# Patient Record
Sex: MALE | Race: OTHER | HISPANIC OR LATINO
[De-identification: names, ages, dates, MRNs, and addresses within clinical notes are randomized per-mention and may not be internally consistent; named-entity substitution may affect disease eponyms.]

---

## 2017-11-27 ENCOUNTER — APPOINTMENT (OUTPATIENT)
Dept: FAMILY MEDICINE | Facility: CLINIC | Age: 32
End: 2017-11-27
Payer: SELF-PAY

## 2017-11-27 ENCOUNTER — EMERGENCY (EMERGENCY)
Facility: HOSPITAL | Age: 32
LOS: 1 days | Discharge: HOME | End: 2017-11-27
Attending: EMERGENCY MEDICINE | Admitting: EMERGENCY MEDICINE
Payer: SELF-PAY

## 2017-11-27 VITALS
DIASTOLIC BLOOD PRESSURE: 99 MMHG | WEIGHT: 227 LBS | SYSTOLIC BLOOD PRESSURE: 152 MMHG | HEIGHT: 73 IN | BODY MASS INDEX: 30.09 KG/M2 | OXYGEN SATURATION: 96 % | HEART RATE: 75 BPM | TEMPERATURE: 97.7 F

## 2017-11-27 VITALS
TEMPERATURE: 98 F | RESPIRATION RATE: 18 BRPM | DIASTOLIC BLOOD PRESSURE: 89 MMHG | SYSTOLIC BLOOD PRESSURE: 141 MMHG | OXYGEN SATURATION: 99 % | HEART RATE: 81 BPM

## 2017-11-27 VITALS
WEIGHT: 220.02 LBS | DIASTOLIC BLOOD PRESSURE: 94 MMHG | HEART RATE: 88 BPM | TEMPERATURE: 98 F | OXYGEN SATURATION: 98 % | SYSTOLIC BLOOD PRESSURE: 162 MMHG | RESPIRATION RATE: 18 BRPM | HEIGHT: 73 IN

## 2017-11-27 DIAGNOSIS — Z87.891 PERSONAL HISTORY OF NICOTINE DEPENDENCE: ICD-10-CM

## 2017-11-27 DIAGNOSIS — M51.26 OTHER INTERVERTEBRAL DISC DISPLACEMENT, LUMBAR REGION: ICD-10-CM

## 2017-11-27 DIAGNOSIS — M48.061 SPINAL STENOSIS, LUMBAR REGION WITHOUT NEUROGENIC CLAUDICATION: ICD-10-CM

## 2017-11-27 DIAGNOSIS — M54.5 LOW BACK PAIN: ICD-10-CM

## 2017-11-27 DIAGNOSIS — Z83.3 FAMILY HISTORY OF DIABETES MELLITUS: ICD-10-CM

## 2017-11-27 DIAGNOSIS — N52.9 MALE ERECTILE DYSFUNCTION, UNSPECIFIED: ICD-10-CM

## 2017-11-27 LAB
ALBUMIN SERPL ELPH-MCNC: 3.9 G/DL — SIGNIFICANT CHANGE UP (ref 3.3–5.2)
ALP SERPL-CCNC: 85 U/L — SIGNIFICANT CHANGE UP (ref 40–120)
ALT FLD-CCNC: 57 U/L — HIGH
ANION GAP SERPL CALC-SCNC: 13 MMOL/L — SIGNIFICANT CHANGE UP (ref 5–17)
APPEARANCE UR: CLEAR — SIGNIFICANT CHANGE UP
AST SERPL-CCNC: 33 U/L — SIGNIFICANT CHANGE UP
BACTERIA # UR AUTO: ABNORMAL
BASOPHILS # BLD AUTO: 0 K/UL — SIGNIFICANT CHANGE UP (ref 0–0.2)
BASOPHILS NFR BLD AUTO: 0.3 % — SIGNIFICANT CHANGE UP (ref 0–2)
BILIRUB SERPL-MCNC: 0.3 MG/DL — LOW (ref 0.4–2)
BILIRUB UR-MCNC: NEGATIVE — SIGNIFICANT CHANGE UP
BUN SERPL-MCNC: 25 MG/DL — HIGH (ref 8–20)
CALCIUM SERPL-MCNC: 9.3 MG/DL — SIGNIFICANT CHANGE UP (ref 8.6–10.2)
CHLORIDE SERPL-SCNC: 100 MMOL/L — SIGNIFICANT CHANGE UP (ref 98–107)
CO2 SERPL-SCNC: 23 MMOL/L — SIGNIFICANT CHANGE UP (ref 22–29)
COLOR SPEC: YELLOW — SIGNIFICANT CHANGE UP
COMMENT - URINE: SIGNIFICANT CHANGE UP
CREAT SERPL-MCNC: 1.07 MG/DL — SIGNIFICANT CHANGE UP (ref 0.5–1.3)
DIFF PNL FLD: ABNORMAL
EOSINOPHIL # BLD AUTO: 0.3 K/UL — SIGNIFICANT CHANGE UP (ref 0–0.5)
EOSINOPHIL NFR BLD AUTO: 2.6 % — SIGNIFICANT CHANGE UP (ref 0–5)
GLUCOSE SERPL-MCNC: 90 MG/DL — SIGNIFICANT CHANGE UP (ref 70–115)
GLUCOSE UR QL: NEGATIVE MG/DL — SIGNIFICANT CHANGE UP
HCT VFR BLD CALC: 45.8 % — SIGNIFICANT CHANGE UP (ref 42–52)
HGB BLD-MCNC: 15.9 G/DL — SIGNIFICANT CHANGE UP (ref 14–18)
KETONES UR-MCNC: NEGATIVE — SIGNIFICANT CHANGE UP
LEUKOCYTE ESTERASE UR-ACNC: NEGATIVE — SIGNIFICANT CHANGE UP
LIDOCAIN IGE QN: 31 U/L — SIGNIFICANT CHANGE UP (ref 22–51)
LYMPHOCYTES # BLD AUTO: 2.5 K/UL — SIGNIFICANT CHANGE UP (ref 1–4.8)
LYMPHOCYTES # BLD AUTO: 23 % — SIGNIFICANT CHANGE UP (ref 20–55)
MCHC RBC-ENTMCNC: 30.3 PG — SIGNIFICANT CHANGE UP (ref 27–31)
MCHC RBC-ENTMCNC: 34.7 G/DL — SIGNIFICANT CHANGE UP (ref 32–36)
MCV RBC AUTO: 87.4 FL — SIGNIFICANT CHANGE UP (ref 80–94)
MONOCYTES # BLD AUTO: 1.2 K/UL — HIGH (ref 0–0.8)
MONOCYTES NFR BLD AUTO: 11.2 % — HIGH (ref 3–10)
NEUTROPHILS # BLD AUTO: 6.7 K/UL — SIGNIFICANT CHANGE UP (ref 1.8–8)
NEUTROPHILS NFR BLD AUTO: 62.4 % — SIGNIFICANT CHANGE UP (ref 37–73)
NITRITE UR-MCNC: NEGATIVE — SIGNIFICANT CHANGE UP
PH UR: 6 — SIGNIFICANT CHANGE UP (ref 5–8)
PLATELET # BLD AUTO: 213 K/UL — SIGNIFICANT CHANGE UP (ref 150–400)
POTASSIUM SERPL-MCNC: 3.8 MMOL/L — SIGNIFICANT CHANGE UP (ref 3.5–5.3)
POTASSIUM SERPL-SCNC: 3.8 MMOL/L — SIGNIFICANT CHANGE UP (ref 3.5–5.3)
PROT SERPL-MCNC: 7.2 G/DL — SIGNIFICANT CHANGE UP (ref 6.6–8.7)
PROT UR-MCNC: 15 MG/DL
RBC # BLD: 5.24 M/UL — SIGNIFICANT CHANGE UP (ref 4.6–6.2)
RBC # FLD: 12.9 % — SIGNIFICANT CHANGE UP (ref 11–15.6)
RBC CASTS # UR COMP ASSIST: SIGNIFICANT CHANGE UP /HPF (ref 0–4)
SODIUM SERPL-SCNC: 136 MMOL/L — SIGNIFICANT CHANGE UP (ref 135–145)
SP GR SPEC: 1.02 — SIGNIFICANT CHANGE UP (ref 1.01–1.02)
UROBILINOGEN FLD QL: NEGATIVE MG/DL — SIGNIFICANT CHANGE UP
WBC # BLD: 10.8 K/UL — SIGNIFICANT CHANGE UP (ref 4.8–10.8)
WBC # FLD AUTO: 10.8 K/UL — SIGNIFICANT CHANGE UP (ref 4.8–10.8)
WBC UR QL: SIGNIFICANT CHANGE UP

## 2017-11-27 PROCEDURE — 93000 ELECTROCARDIOGRAM COMPLETE: CPT

## 2017-11-27 PROCEDURE — 36415 COLL VENOUS BLD VENIPUNCTURE: CPT

## 2017-11-27 PROCEDURE — 81001 URINALYSIS AUTO W/SCOPE: CPT

## 2017-11-27 PROCEDURE — 80053 COMPREHEN METABOLIC PANEL: CPT

## 2017-11-27 PROCEDURE — 85027 COMPLETE CBC AUTOMATED: CPT

## 2017-11-27 PROCEDURE — 99053 MED SERV 10PM-8AM 24 HR FAC: CPT

## 2017-11-27 PROCEDURE — 96374 THER/PROPH/DIAG INJ IV PUSH: CPT | Mod: XU

## 2017-11-27 PROCEDURE — 74177 CT ABD & PELVIS W/CONTRAST: CPT | Mod: 26

## 2017-11-27 PROCEDURE — 99284 EMERGENCY DEPT VISIT MOD MDM: CPT | Mod: 25

## 2017-11-27 PROCEDURE — 74177 CT ABD & PELVIS W/CONTRAST: CPT

## 2017-11-27 PROCEDURE — 96375 TX/PRO/DX INJ NEW DRUG ADDON: CPT

## 2017-11-27 PROCEDURE — 87086 URINE CULTURE/COLONY COUNT: CPT

## 2017-11-27 PROCEDURE — 99395 PREV VISIT EST AGE 18-39: CPT | Mod: 25

## 2017-11-27 PROCEDURE — 96376 TX/PRO/DX INJ SAME DRUG ADON: CPT

## 2017-11-27 PROCEDURE — 99285 EMERGENCY DEPT VISIT HI MDM: CPT | Mod: 25

## 2017-11-27 PROCEDURE — 83690 ASSAY OF LIPASE: CPT

## 2017-11-27 PROCEDURE — 99214 OFFICE O/P EST MOD 30 MIN: CPT

## 2017-11-27 RX ORDER — SODIUM CHLORIDE 9 MG/ML
1000 INJECTION INTRAMUSCULAR; INTRAVENOUS; SUBCUTANEOUS ONCE
Qty: 0 | Refills: 0 | Status: COMPLETED | OUTPATIENT
Start: 2017-11-27 | End: 2017-11-27

## 2017-11-27 RX ORDER — METRONIDAZOLE 500 MG
1 TABLET ORAL
Qty: 42 | Refills: 0
Start: 2017-11-27 | End: 2017-12-11

## 2017-11-27 RX ORDER — KETOROLAC TROMETHAMINE 30 MG/ML
30 SYRINGE (ML) INJECTION ONCE
Qty: 0 | Refills: 0 | Status: DISCONTINUED | OUTPATIENT
Start: 2017-11-27 | End: 2017-11-27

## 2017-11-27 RX ORDER — MORPHINE SULFATE 50 MG/1
4 CAPSULE, EXTENDED RELEASE ORAL ONCE
Qty: 0 | Refills: 0 | Status: DISCONTINUED | OUTPATIENT
Start: 2017-11-27 | End: 2017-11-27

## 2017-11-27 RX ORDER — SODIUM CHLORIDE 9 MG/ML
3 INJECTION INTRAMUSCULAR; INTRAVENOUS; SUBCUTANEOUS ONCE
Qty: 0 | Refills: 0 | Status: COMPLETED | OUTPATIENT
Start: 2017-11-27 | End: 2017-11-27

## 2017-11-27 RX ORDER — CIPROFLOXACIN LACTATE 400MG/40ML
1 VIAL (ML) INTRAVENOUS
Qty: 28 | Refills: 0
Start: 2017-11-27 | End: 2017-12-11

## 2017-11-27 RX ADMIN — SODIUM CHLORIDE 3 MILLILITER(S): 9 INJECTION INTRAMUSCULAR; INTRAVENOUS; SUBCUTANEOUS at 02:05

## 2017-11-27 RX ADMIN — MORPHINE SULFATE 4 MILLIGRAM(S): 50 CAPSULE, EXTENDED RELEASE ORAL at 04:02

## 2017-11-27 RX ADMIN — MORPHINE SULFATE 4 MILLIGRAM(S): 50 CAPSULE, EXTENDED RELEASE ORAL at 02:05

## 2017-11-27 RX ADMIN — Medication 30 MILLIGRAM(S): at 02:05

## 2017-11-27 RX ADMIN — SODIUM CHLORIDE 1000 MILLILITER(S): 9 INJECTION INTRAMUSCULAR; INTRAVENOUS; SUBCUTANEOUS at 02:05

## 2017-11-27 NOTE — ED PROVIDER NOTE - OBJECTIVE STATEMENT
This is a 31 y/o M presenting to the ED complaining of abdominal pain since yesterday at 0200. Patient states that he was sleeping when he started to feel a sharp lower abdominal pain. Patient states that the abdominal pain continued all day yesterday. Endorses nausea, vomiting, and diarrhea x multiple episodes. He reports the diarrhea is yellowish-brownish in color with trace amounts of blood. Deneis recent changes in diet, abx use, sick contacts, and recent travel. Denies prior abdominal surgeries. He also adds that he had a fever yesterday that was improved with tylenol.

## 2017-11-27 NOTE — ED ADULT TRIAGE NOTE - CHIEF COMPLAINT QUOTE
Pt c/o suprapubic pain and fevers since yesterday, denies pain/burning upon urination, denies N/V, c/o "some diarrhea"

## 2017-11-27 NOTE — ED ADULT NURSE NOTE - OBJECTIVE STATEMENT
Pt A&Ox4 c/o abd pain with diarrhea and blood when wiping at this time, started yesterday. Pt resting comfortably, VSS, no signs of distress at this time, pt went to CT, safety maintained, call bell in reach.

## 2017-11-27 NOTE — ED PROVIDER NOTE - PROGRESS NOTE DETAILS
pt reported bright red blood per rectum in ER.  rectal reveals brown watery stool with possible internal hemorrhoids nontoxic.  labs and imaging reviewed.   no diverticulitis on CT, but given LLQ pain/tenderness, change in bowel habits, and blood will d/c with abx and outpt gi follow up

## 2017-11-28 LAB
CULTURE RESULTS: NO GROWTH — SIGNIFICANT CHANGE UP
SPECIMEN SOURCE: SIGNIFICANT CHANGE UP

## 2018-01-03 ENCOUNTER — APPOINTMENT (OUTPATIENT)
Dept: FAMILY MEDICINE | Facility: CLINIC | Age: 33
End: 2018-01-03

## 2018-12-28 ENCOUNTER — APPOINTMENT (OUTPATIENT)
Dept: INTERNAL MEDICINE | Facility: CLINIC | Age: 33
End: 2018-12-28

## 2019-04-10 ENCOUNTER — APPOINTMENT (OUTPATIENT)
Dept: INTERNAL MEDICINE | Facility: CLINIC | Age: 34
End: 2019-04-10

## 2019-04-12 ENCOUNTER — EMERGENCY (EMERGENCY)
Facility: HOSPITAL | Age: 34
LOS: 1 days | Discharge: HOME | End: 2019-04-12
Attending: EMERGENCY MEDICINE
Payer: COMMERCIAL

## 2019-04-12 VITALS — HEART RATE: 81 BPM | RESPIRATION RATE: 18 BRPM | SYSTOLIC BLOOD PRESSURE: 146 MMHG | DIASTOLIC BLOOD PRESSURE: 103 MMHG

## 2019-04-12 VITALS
WEIGHT: 212.08 LBS | HEIGHT: 73 IN | HEART RATE: 94 BPM | RESPIRATION RATE: 20 BRPM | SYSTOLIC BLOOD PRESSURE: 166 MMHG | TEMPERATURE: 98 F | DIASTOLIC BLOOD PRESSURE: 107 MMHG | OXYGEN SATURATION: 98 %

## 2019-04-12 LAB
ALBUMIN SERPL ELPH-MCNC: 4.4 G/DL — SIGNIFICANT CHANGE UP (ref 3.3–5.2)
ALP SERPL-CCNC: 73 U/L — SIGNIFICANT CHANGE UP (ref 40–120)
ALT FLD-CCNC: 90 U/L — HIGH
ANION GAP SERPL CALC-SCNC: 13 MMOL/L — SIGNIFICANT CHANGE UP (ref 5–17)
APPEARANCE UR: CLEAR — SIGNIFICANT CHANGE UP
AST SERPL-CCNC: 64 U/L — HIGH
BASOPHILS # BLD AUTO: 0 K/UL — SIGNIFICANT CHANGE UP (ref 0–0.2)
BASOPHILS NFR BLD AUTO: 0.2 % — SIGNIFICANT CHANGE UP (ref 0–2)
BILIRUB SERPL-MCNC: 0.7 MG/DL — SIGNIFICANT CHANGE UP (ref 0.4–2)
BILIRUB UR-MCNC: NEGATIVE — SIGNIFICANT CHANGE UP
BUN SERPL-MCNC: 27 MG/DL — HIGH (ref 8–20)
CALCIUM SERPL-MCNC: 9.4 MG/DL — SIGNIFICANT CHANGE UP (ref 8.6–10.2)
CHLORIDE SERPL-SCNC: 103 MMOL/L — SIGNIFICANT CHANGE UP (ref 98–107)
CO2 SERPL-SCNC: 23 MMOL/L — SIGNIFICANT CHANGE UP (ref 22–29)
COLOR SPEC: YELLOW — SIGNIFICANT CHANGE UP
CREAT SERPL-MCNC: 0.8 MG/DL — SIGNIFICANT CHANGE UP (ref 0.5–1.3)
DIFF PNL FLD: NEGATIVE — SIGNIFICANT CHANGE UP
EOSINOPHIL # BLD AUTO: 0.2 K/UL — SIGNIFICANT CHANGE UP (ref 0–0.5)
EOSINOPHIL NFR BLD AUTO: 2.7 % — SIGNIFICANT CHANGE UP (ref 0–5)
GLUCOSE SERPL-MCNC: 88 MG/DL — SIGNIFICANT CHANGE UP (ref 70–115)
GLUCOSE UR QL: NEGATIVE MG/DL — SIGNIFICANT CHANGE UP
HCT VFR BLD CALC: 46.2 % — SIGNIFICANT CHANGE UP (ref 42–52)
HGB BLD-MCNC: 16.1 G/DL — SIGNIFICANT CHANGE UP (ref 14–18)
KETONES UR-MCNC: NEGATIVE — SIGNIFICANT CHANGE UP
LEUKOCYTE ESTERASE UR-ACNC: NEGATIVE — SIGNIFICANT CHANGE UP
LIDOCAIN IGE QN: 27 U/L — SIGNIFICANT CHANGE UP (ref 22–51)
LYMPHOCYTES # BLD AUTO: 2.1 K/UL — SIGNIFICANT CHANGE UP (ref 1–4.8)
LYMPHOCYTES # BLD AUTO: 24.9 % — SIGNIFICANT CHANGE UP (ref 20–55)
MCHC RBC-ENTMCNC: 32.2 PG — HIGH (ref 27–31)
MCHC RBC-ENTMCNC: 34.8 G/DL — SIGNIFICANT CHANGE UP (ref 32–36)
MCV RBC AUTO: 92.4 FL — SIGNIFICANT CHANGE UP (ref 80–94)
MONOCYTES # BLD AUTO: 0.6 K/UL — SIGNIFICANT CHANGE UP (ref 0–0.8)
MONOCYTES NFR BLD AUTO: 7.3 % — SIGNIFICANT CHANGE UP (ref 3–10)
NEUTROPHILS # BLD AUTO: 5.4 K/UL — SIGNIFICANT CHANGE UP (ref 1.8–8)
NEUTROPHILS NFR BLD AUTO: 64.7 % — SIGNIFICANT CHANGE UP (ref 37–73)
NITRITE UR-MCNC: NEGATIVE — SIGNIFICANT CHANGE UP
OB PNL STL: NEGATIVE — SIGNIFICANT CHANGE UP
PH UR: 5 — SIGNIFICANT CHANGE UP (ref 5–8)
PLATELET # BLD AUTO: 230 K/UL — SIGNIFICANT CHANGE UP (ref 150–400)
POTASSIUM SERPL-MCNC: 4.1 MMOL/L — SIGNIFICANT CHANGE UP (ref 3.5–5.3)
POTASSIUM SERPL-SCNC: 4.1 MMOL/L — SIGNIFICANT CHANGE UP (ref 3.5–5.3)
PROT SERPL-MCNC: 7.5 G/DL — SIGNIFICANT CHANGE UP (ref 6.6–8.7)
PROT UR-MCNC: NEGATIVE MG/DL — SIGNIFICANT CHANGE UP
RBC # BLD: 5 M/UL — SIGNIFICANT CHANGE UP (ref 4.6–6.2)
RBC # FLD: 13.1 % — SIGNIFICANT CHANGE UP (ref 11–15.6)
SODIUM SERPL-SCNC: 139 MMOL/L — SIGNIFICANT CHANGE UP (ref 135–145)
SP GR SPEC: 1.02 — SIGNIFICANT CHANGE UP (ref 1.01–1.02)
UROBILINOGEN FLD QL: NEGATIVE MG/DL — SIGNIFICANT CHANGE UP
WBC # BLD: 8.3 K/UL — SIGNIFICANT CHANGE UP (ref 4.8–10.8)
WBC # FLD AUTO: 8.3 K/UL — SIGNIFICANT CHANGE UP (ref 4.8–10.8)

## 2019-04-12 PROCEDURE — 87591 N.GONORRHOEAE DNA AMP PROB: CPT

## 2019-04-12 PROCEDURE — 80053 COMPREHEN METABOLIC PANEL: CPT

## 2019-04-12 PROCEDURE — 99284 EMERGENCY DEPT VISIT MOD MDM: CPT

## 2019-04-12 PROCEDURE — 76870 US EXAM SCROTUM: CPT | Mod: 26

## 2019-04-12 PROCEDURE — 87491 CHLMYD TRACH DNA AMP PROBE: CPT

## 2019-04-12 PROCEDURE — 83690 ASSAY OF LIPASE: CPT

## 2019-04-12 PROCEDURE — 81003 URINALYSIS AUTO W/O SCOPE: CPT

## 2019-04-12 PROCEDURE — 85027 COMPLETE CBC AUTOMATED: CPT

## 2019-04-12 PROCEDURE — 76870 US EXAM SCROTUM: CPT

## 2019-04-12 PROCEDURE — 36415 COLL VENOUS BLD VENIPUNCTURE: CPT

## 2019-04-12 PROCEDURE — 82272 OCCULT BLD FECES 1-3 TESTS: CPT

## 2019-04-12 NOTE — ED STATDOCS - GENITOURINARY, MLM
normal... no bilateral CVA tenderness. no inguinal adenopathy, no rash, penis is uncircumcised, no urethral discharge, no scrotal swelling, right testicle tenderness

## 2019-04-12 NOTE — ED ADULT NURSE NOTE - CHPI ED NUR SYMPTOMS NEG
no dizziness/no fever/no tingling/no vomiting/no weakness/no chills/no decreased eating/drinking/no nausea

## 2019-04-12 NOTE — ED STATDOCS - SECONDARY DIAGNOSIS.
Inguinal pain, unspecified laterality Hypertension, unspecified type Noncompliance with medication regimen

## 2019-04-12 NOTE — ED STATDOCS - NS_ ATTENDINGSCRIBEDETAILS _ED_A_ED_FT
I, Alec Snyder, performed the initial face to face bedside interview with this patient regarding history of present illness, review of symptoms and relevant past medical, social and family history.  I completed an independent physical examination.  I was the provider who initially evaluated this patient.  The history, relevant review of systems, past medical and surgical history, medical decision making, and physical examination was documented by the scribe in my presence and I attest to the accuracy of the documentation. Follow-up on ordered tests (ie labs, radiologic studies) and re-evaluation of the patient's status has been communicated to the ACP.  Disposition of the patient will be based on test outcome and response to ED interventions.

## 2019-04-12 NOTE — ED STATDOCS - CARE PLAN
Principal Discharge DX:	Testicular pain, unspecified  Secondary Diagnosis:	Inguinal pain, unspecified laterality Principal Discharge DX:	Testicular pain, unspecified  Secondary Diagnosis:	Inguinal pain, unspecified laterality  Secondary Diagnosis:	Hypertension, unspecified type  Secondary Diagnosis:	Noncompliance with medication regimen

## 2019-04-12 NOTE — ED STATDOCS - OBJECTIVE STATEMENT
34 y/o M pt with hx of colitis and HTN presents to ED c/o intermittent rectal bleeding and intermittent groin pain x 3 weeks. He believes the groin pain may be infection from a jacuzzi; because his wife was in same jacuzzi, was diagnosed with a yeast infection which resolved with abx; tried his wifes abx (ampicllin) with no relief. Describes the pain as a throbbing sensation in his right testicle. Also reports moderate RUQ ABD pain and dysuria. No vomiting or fever. Recently travelled from a foreign country. Is a smoker. NKDA. No further complaints at this time. 32 y/o M pt with hx of colitis and HTN presents to ED c/o intermittent rectal bleeding and intermittent groin pain x 3 weeks. He believes the groin pain may be infection from a jacuzzi; because his partner was in same jacuzzi, was diagnosed with a yeast infection which resolved with abx. Describes the pain as a burning sensation in the penis and soreness in his right testicle. Denies urethral discharge.  denies scrotal swelling.  Denies rash of groin or scrotum.  Also reports intermittent RUQ ABD pain with prior elevated LFTs.  Denies vomiting or fever. Patient reports taking various doses of metronidazole, amoxicillin and penicillin (all non-prescribed) over the course of the past 3 weeks with no relief.  Recently travelled from a foreign country. Is a smoker. NKDA. H/O HTN: reports that he is not taking any meds.  No further complaints at this time.

## 2019-04-12 NOTE — ED STATDOCS - PROGRESS NOTE DETAILS
pt is seen By Dr hernandez initially agreed with hx / pe and plan   pt has PCP Dr russell that did not f/u past 5 yrs . + hx htn was on medication dose not want to take med - states h want to try by eating healthy and count the carbs . pending Us . pt states he will f/u With his pcp labs and US d/w dr hernandez w.o any acute finding - RUQ not TTP - pt is asymptomatic   a copy of b/W and US given to the pt to follow up with pcp for possible us of the abdomen and BP control

## 2019-04-12 NOTE — ED STATDOCS - CLINICAL SUMMARY MEDICAL DECISION MAKING FREE TEXT BOX
Intermittent rectal bleeding, testicular pain, groin pain; etiology unclear, will check labs, US of testicle and UA. Intermittent rectal bleeding, testicular pain, groin pain with normal examination; etiology unclear. will check labs, US of testicle and UA.

## 2019-04-12 NOTE — ED ADULT NURSE NOTE - OBJECTIVE STATEMENT
Patient c/o intermittent rectal bleeding with clots and right sided throbbing groin pain x 3 weeks. Patient denies fever. Patient states he has hx of colitis but doesn't see a GI doctor for it. Last bout was 5 years ago. Patient denies CP, SOB, dizziness, N/V, weakness or any other acute sx at this time.

## 2019-04-13 LAB
C TRACH RRNA SPEC QL NAA+PROBE: SIGNIFICANT CHANGE UP
N GONORRHOEA RRNA SPEC QL NAA+PROBE: SIGNIFICANT CHANGE UP
SPECIMEN SOURCE: SIGNIFICANT CHANGE UP

## 2019-04-17 ENCOUNTER — APPOINTMENT (OUTPATIENT)
Dept: INTERNAL MEDICINE | Facility: CLINIC | Age: 34
End: 2019-04-17
Payer: COMMERCIAL

## 2019-04-17 ENCOUNTER — NON-APPOINTMENT (OUTPATIENT)
Age: 34
End: 2019-04-17

## 2019-04-17 VITALS
WEIGHT: 213 LBS | BODY MASS INDEX: 28.23 KG/M2 | RESPIRATION RATE: 16 BRPM | HEART RATE: 97 BPM | OXYGEN SATURATION: 97 % | TEMPERATURE: 98.3 F | SYSTOLIC BLOOD PRESSURE: 154 MMHG | DIASTOLIC BLOOD PRESSURE: 112 MMHG | HEIGHT: 73 IN

## 2019-04-17 DIAGNOSIS — Z87.19 PERSONAL HISTORY OF OTHER DISEASES OF THE DIGESTIVE SYSTEM: ICD-10-CM

## 2019-04-17 DIAGNOSIS — N50.82 SCROTAL PAIN: ICD-10-CM

## 2019-04-17 DIAGNOSIS — N64.4 MASTODYNIA: ICD-10-CM

## 2019-04-17 DIAGNOSIS — F17.200 NICOTINE DEPENDENCE, UNSPECIFIED, UNCOMPLICATED: ICD-10-CM

## 2019-04-17 DIAGNOSIS — Z86.39 PERSONAL HISTORY OF OTHER ENDOCRINE, NUTRITIONAL AND METABOLIC DISEASE: ICD-10-CM

## 2019-04-17 DIAGNOSIS — N48.9 DISORDER OF PENIS, UNSPECIFIED: ICD-10-CM

## 2019-04-17 DIAGNOSIS — Z87.898 PERSONAL HISTORY OF OTHER SPECIFIED CONDITIONS: ICD-10-CM

## 2019-04-17 DIAGNOSIS — R74.8 ABNORMAL LEVELS OF OTHER SERUM ENZYMES: ICD-10-CM

## 2019-04-17 DIAGNOSIS — Z86.79 PERSONAL HISTORY OF OTHER DISEASES OF THE CIRCULATORY SYSTEM: ICD-10-CM

## 2019-04-17 DIAGNOSIS — E66.3 OVERWEIGHT: ICD-10-CM

## 2019-04-17 DIAGNOSIS — Z01.818 ENCOUNTER FOR OTHER PREPROCEDURAL EXAMINATION: ICD-10-CM

## 2019-04-17 PROCEDURE — 93000 ELECTROCARDIOGRAM COMPLETE: CPT

## 2019-04-17 PROCEDURE — 36415 COLL VENOUS BLD VENIPUNCTURE: CPT

## 2019-04-17 PROCEDURE — 99215 OFFICE O/P EST HI 40 MIN: CPT | Mod: 25

## 2019-04-17 PROCEDURE — 99406 BEHAV CHNG SMOKING 3-10 MIN: CPT

## 2019-04-17 RX ORDER — ACETAMINOPHEN 325 MG/1
325 TABLET, FILM COATED ORAL EVERY 6 HOURS
Qty: 90 | Refills: 0 | Status: DISCONTINUED | COMMUNITY
Start: 2017-11-27 | End: 2019-04-17

## 2019-04-17 RX ORDER — OMEPRAZOLE 40 MG/1
40 CAPSULE, DELAYED RELEASE ORAL
Qty: 30 | Refills: 2 | Status: DISCONTINUED | COMMUNITY
Start: 2017-11-27 | End: 2019-04-17

## 2019-04-17 RX ORDER — AMLODIPINE BESYLATE 10 MG/1
10 TABLET ORAL DAILY
Qty: 90 | Refills: 1 | Status: DISCONTINUED | COMMUNITY
Start: 2017-11-27 | End: 2019-04-17

## 2019-04-17 RX ORDER — METRONIDAZOLE 500 MG/1
500 TABLET, FILM COATED ORAL EVERY 8 HOURS
Qty: 21 | Refills: 0 | Status: DISCONTINUED | COMMUNITY
Start: 2017-11-27 | End: 2019-04-17

## 2019-04-17 RX ORDER — CIPROFLOXACIN HYDROCHLORIDE 500 MG/1
500 TABLET, FILM COATED ORAL
Qty: 20 | Refills: 0 | Status: DISCONTINUED | COMMUNITY
Start: 2017-11-27 | End: 2019-04-17

## 2019-04-18 LAB
APPEARANCE: CLEAR
BACTERIA: NEGATIVE
BILIRUBIN URINE: NEGATIVE
BLOOD URINE: NEGATIVE
COLOR: YELLOW
GLUCOSE QUALITATIVE U: NEGATIVE
HYALINE CASTS: 1 /LPF
KETONES URINE: NEGATIVE
LEUKOCYTE ESTERASE URINE: NEGATIVE
MICROSCOPIC-UA: NORMAL
NITRITE URINE: NEGATIVE
PH URINE: 6.5
PROTEIN URINE: ABNORMAL
RED BLOOD CELLS URINE: 1 /HPF
SPECIFIC GRAVITY URINE: 1.03
SQUAMOUS EPITHELIAL CELLS: 1 /HPF
UROBILINOGEN URINE: NORMAL
WHITE BLOOD CELLS URINE: 1 /HPF

## 2019-04-21 PROBLEM — N64.4 NIPPLE PAIN: Status: RESOLVED | Noted: 2017-11-27 | Resolved: 2019-04-21

## 2019-04-21 PROBLEM — N48.9 PENILE LESION: Status: ACTIVE | Noted: 2019-04-21

## 2019-04-21 PROBLEM — Z87.898 HISTORY OF PALPITATIONS: Status: RESOLVED | Noted: 2017-11-27 | Resolved: 2019-04-21

## 2019-04-21 PROBLEM — Z87.19 HISTORY OF GASTRITIS: Status: RESOLVED | Noted: 2017-11-27 | Resolved: 2019-04-21

## 2019-04-21 PROBLEM — Z87.19 HISTORY OF RECTAL BLEEDING: Status: RESOLVED | Noted: 2017-11-27 | Resolved: 2019-04-21

## 2019-04-21 PROBLEM — E66.3 OVERWEIGHT (BMI 25.0-29.9): Status: ACTIVE | Noted: 2019-04-21

## 2019-04-21 PROBLEM — F17.200 CURRENT EVERY DAY SMOKER: Status: ACTIVE | Noted: 2017-11-27

## 2019-04-21 PROBLEM — Z87.19 HISTORY OF COLITIS: Status: RESOLVED | Noted: 2017-11-27 | Resolved: 2019-04-21

## 2019-04-21 NOTE — HISTORY OF PRESENT ILLNESS
[FreeTextEntry1] : Here for follow up after ER visit\par  [de-identified] : Here for follow up after going to the ER\par \par He states that him and his fiancee went out of the country to [facility]. He says that they spent a lot of time in [facility] and notes that he and his fiancee started to feel discomfort in their genitourinary area. He believed that he and his fiancee caught a yeast infection while in [facility]. His wife went to her gynecologist and was dx with bacterial vaginosis and was prescribed Metronidazole. \par \par He describes his genital discomfort as pressure around his testicles, abd, and thighs for about a month now. He went to the hospital on Friday and they did a scrotal US that was negative and labs were normal.  He denies having a rash or dysuria.  he thought a yeast infection could be the cause.  He denies fever/chills, abd pain, nausea, vomiting, diarrhea, burning on urination, urinary frequency, or penile discharge\par \par he does states he has some skin growths on the shaft of his penis but states he has had them since he was young\par \par He has not been taking his BP medication in over a year\par \par He states he does not drink caffeine but he does drink a lot of  ETOH. He states the he smokes about a pack of cigarettes every two days. \par \par He notes that he is no longer experiencing nipple pain and believed it was due to using steroids at the time. He states he is no longer using steroids. \par \par

## 2019-04-21 NOTE — ASSESSMENT
[FreeTextEntry1] : \par Scrotal discomfort:\par -unclear etiology\par -will check labs again\par -he had scotral US in ER which was unrevealing\par -I have advised he see a Urologist-referred today\par \par Penile skin lesion: possible genital warts\par -I discussed with him that this may be STD\par -will refer to Urologist\par \par Uncontrolled HTN:\par -risks of uncontrolled HTN discussed with him at length\par -He has been noncompliant with follow up and has not take medication in over a year\par -I advised adv he restart amlodipine 10mg PO daily  (R/B/A/side effects discussed)\par -I advised low fat/low cholesterol diet, low salt diet, and weight loss\par -EKG done today NSR at 84, LVH, non specific T wave abnormalities\par -f/u 4-6 weeks for BP check\par \par Abnormal EKG:\par -he is asymptomatic\par -will refer him to cardiology\par \par Overweight:\par -BMI 28\par -I advised low fat/low cholesterol diet, low salt diet, and weight loss\par \par Colitis: in 2017\par -he was previously referred to GI but he never went\par -he states sx resolved\par \par Left nipple pain:\par -resolved\par -he never went for imaginf that was ordered in 2017\par -he states he was doing steroids at the time and thinks that was cause\par -he is no longer doing steroids or any other drug\par \par Mod alcohol use:\par -he states he does not drink every day but does drink more heavily on weekends and when he goes out\par -I advised him to limit alcoholic beverages to no more that 2 per day\par -risks of alcohol abuse discussed\par \par Elevated LFTs:\par -may be due to alcohol use\par -I adv he cut down on alcohol use\par -will check labs again\par -will order abd US\par -will check hepatitis B and C serologies\par \par Smoker:\par -smoking cessation advised\par -risks of smoking discussed\par -counseling time 3 minutes\par \par HCM:\par \par CPE: 2017\par \par EKG 2019\par \par Tdap:  10/14/2014\par \par Hep B series: advised previously\par \par HIV testing  10/14/2014-negative.  Offered again today and he consented to testing 2019\par \par Hepatitis C: negative 10/2014\par \par Lipids near goal 10/2014-will check fasting lipids-ordered today\par \par Depression screenin2019 PHQ 2 score 0-negative\par \par F/U 4-6 weeks for BP check and lab review\par

## 2019-04-21 NOTE — COUNSELING
[Weight management counseling provided] : Weight management [Healthy eating counseling provided] : healthy eating [Patient Non-adherent to care plan] : Patient non-adherent to care plan [Activity counseling provided] : activity [Good understanding] : Patient has a good understanding of lifestyle changes and the steps needed to achieve self management goals [Tobacco Use Cessation Intermediate Greater Than 3 Minutes Up to 10 Minutes] : Tobacco Use Cessation Intermediate Greater Than 3 Minutes Up to 10 Minutes [ - Annual Depression Screening] : Annual Depression Screening

## 2019-04-21 NOTE — DISCUSSION/SUMMARY
[Procedure Intermediate Risk] : the procedure risk is intermediate [Patient Low Risk] : the patient is a low surgical risk [Optimized for Surgery Pending Laboratory Results] : the patient is optimized for surgery pending laboratory results [Continue] : Continue medications as currently directed [As per surgery] : as per surgery [FreeTextEntry1] : \par

## 2019-04-21 NOTE — END OF VISIT
[>50% of Time Spent on Counseling for ____] : Greater than 50% of the encounter time was spent on counseling for [unfilled] [Time Spent: ___ minutes] : I have spent [unfilled] minutes of face to face time with the patient [FreeTextEntry3] : All medical entries made by the Scribe were at my, Dr. Amilcar Jacinto's, direction and personally dictated by me on [4/17/19]. I have reviewed the chart and agree that the record accurately reflects my personal performance of the history, physical exam, assessment and plan. I have also personally directed, reviewed, and agreed with the chart.\par

## 2019-04-21 NOTE — REVIEW OF SYSTEMS
[Negative] : Heme/Lymph [Fever] : no fever [Chills] : no chills [Fatigue] : no fatigue [Earache] : no earache [Nasal Discharge] : no nasal discharge [Sore Throat] : no sore throat [Palpitations] : no palpitations [Chest Pain] : no chest pain [Lower Ext Edema] : no lower extremity edema [Wheezing] : no wheezing [Shortness Of Breath] : no shortness of breath [Dyspnea on Exertion] : not dyspnea on exertion [Cough] : no cough [Abdominal Pain] : no abdominal pain [Nausea] : no nausea [Diarrhea] : no diarrhea [Dysuria] : no dysuria [Vomiting] : no vomiting [Hematuria] : no hematuria [Frequency] : no frequency [Anxiety] : no anxiety [Depression] : no depression [FreeTextEntry2] : lost 14 lbs since 11/2017 [FreeTextEntry8] : see HPI [de-identified] : see HPI

## 2019-04-21 NOTE — ADDENDUM
[FreeTextEntry1] : I, Tasha Andrews, acted solely as a scribe for Dr. Jacinto on this date [4/17/19].\par

## 2019-04-21 NOTE — PHYSICAL EXAM
[Well-Appearing] : well-appearing [No Acute Distress] : no acute distress [Normal Voice/Communication] : normal voice/communication [Normal Sclera/Conjunctiva] : normal sclera/conjunctiva [PERRL] : pupils equal round and reactive to light [No JVD] : no jugular venous distention [Normal Oropharynx] : the oropharynx was normal [Supple] : supple [No Lymphadenopathy] : no lymphadenopathy [No Respiratory Distress] : no respiratory distress  [Thyroid Normal, No Nodules] : the thyroid was normal and there were no nodules present [Clear to Auscultation] : lungs were clear to auscultation bilaterally [Normal Percussion] : the chest was normal to percussion [No Accessory Muscle Use] : no accessory muscle use [Regular Rhythm] : with a regular rhythm [Normal S1, S2] : normal S1 and S2 [Normal Rate] : normal rate  [No Murmur] : no murmur heard [Non-distended] : non-distended [Soft] : abdomen soft [Non Tender] : non-tender [No Masses] : no abdominal mass palpated [No HSM] : no HSM [No CVA Tenderness] : no CVA  tenderness [No Spinal Tenderness] : no spinal tenderness [No Rash] : no rash [Normal Affect] : the affect was normal [No Focal Deficits] : no focal deficits [Alert and Oriented x3] : oriented to person, place, and time [Normal Mood] : the mood was normal [EOMI] : extraocular movements intact [No Joint Swelling] : no joint swelling [No Edema] : there was no peripheral edema [Normal Insight/Judgement] : insight and judgment were intact [de-identified] : No inguinal adenopathy or hernia, no penile discharge, he does have multiple brown rough appears papular skin lesions on shaft of penis, no testicular tenderness, masses, or nodules [de-identified] : no calf tenderness [de-identified] : see genital exam

## 2019-04-22 PROBLEM — I10 ESSENTIAL (PRIMARY) HYPERTENSION: Chronic | Status: ACTIVE | Noted: 2019-04-19

## 2019-04-22 LAB
25(OH)D3 SERPL-MCNC: 27.9 NG/ML
ALBUMIN SERPL ELPH-MCNC: 5.1 G/DL
ALP BLD-CCNC: 85 U/L
ALT SERPL-CCNC: 92 U/L
ANION GAP SERPL CALC-SCNC: 15 MMOL/L
AST SERPL-CCNC: 63 U/L
BACTERIA UR CULT: NORMAL
BASOPHILS # BLD AUTO: 0.04 K/UL
BASOPHILS NFR BLD AUTO: 0.6 %
BILIRUB SERPL-MCNC: 0.8 MG/DL
BUN SERPL-MCNC: 25 MG/DL
CALCIUM SERPL-MCNC: 10.5 MG/DL
CHLORIDE SERPL-SCNC: 101 MMOL/L
CHOLEST SERPL-MCNC: 216 MG/DL
CHOLEST/HDLC SERPL: 2.9 RATIO
CO2 SERPL-SCNC: 26 MMOL/L
CREAT SERPL-MCNC: 1.01 MG/DL
EOSINOPHIL # BLD AUTO: 0.22 K/UL
EOSINOPHIL NFR BLD AUTO: 3.4 %
ERYTHROCYTE [SEDIMENTATION RATE] IN BLOOD BY WESTERGREN METHOD: 23 MM/HR
ESTIMATED AVERAGE GLUCOSE: 105 MG/DL
GLUCOSE SERPL-MCNC: 97 MG/DL
HBA1C MFR BLD HPLC: 5.3 %
HBV CORE IGG+IGM SER QL: NONREACTIVE
HBV SURFACE AB SER QL: NONREACTIVE
HBV SURFACE AG SER QL: NONREACTIVE
HCT VFR BLD CALC: 53.4 %
HCV AB SER QL: NONREACTIVE
HCV S/CO RATIO: 0.06 S/CO
HDLC SERPL-MCNC: 75 MG/DL
HGB BLD-MCNC: 17.1 G/DL
HIV1+2 AB SPEC QL IA.RAPID: NONREACTIVE
IMM GRANULOCYTES NFR BLD AUTO: 0.5 %
LDLC SERPL CALC-MCNC: 110 MG/DL
LYMPHOCYTES # BLD AUTO: 1.78 K/UL
LYMPHOCYTES NFR BLD AUTO: 27.8 %
MAN DIFF?: NORMAL
MCHC RBC-ENTMCNC: 30.8 PG
MCHC RBC-ENTMCNC: 32 GM/DL
MCV RBC AUTO: 96.2 FL
MONOCYTES # BLD AUTO: 0.49 K/UL
MONOCYTES NFR BLD AUTO: 7.7 %
NEUTROPHILS # BLD AUTO: 3.84 K/UL
NEUTROPHILS NFR BLD AUTO: 60 %
PLATELET # BLD AUTO: 264 K/UL
POTASSIUM SERPL-SCNC: 4.6 MMOL/L
PROT SERPL-MCNC: 8 G/DL
PSA SERPL-MCNC: 0.62 NG/ML
RBC # BLD: 5.55 M/UL
RBC # FLD: 13.1 %
SODIUM SERPL-SCNC: 142 MMOL/L
T4 FREE SERPL-MCNC: 1.3 NG/DL
TRIGL SERPL-MCNC: 156 MG/DL
TSH SERPL-ACNC: 1.64 UIU/ML
WBC # FLD AUTO: 6.4 K/UL

## 2019-04-24 ENCOUNTER — APPOINTMENT (OUTPATIENT)
Dept: ULTRASOUND IMAGING | Facility: CLINIC | Age: 34
End: 2019-04-24

## 2019-05-06 ENCOUNTER — APPOINTMENT (OUTPATIENT)
Dept: ULTRASOUND IMAGING | Facility: CLINIC | Age: 34
End: 2019-05-06

## 2019-05-08 ENCOUNTER — APPOINTMENT (OUTPATIENT)
Dept: UROLOGY | Facility: CLINIC | Age: 34
End: 2019-05-08
Payer: COMMERCIAL

## 2019-05-08 VITALS
WEIGHT: 214 LBS | SYSTOLIC BLOOD PRESSURE: 157 MMHG | OXYGEN SATURATION: 96 % | DIASTOLIC BLOOD PRESSURE: 99 MMHG | BODY MASS INDEX: 28.36 KG/M2 | TEMPERATURE: 98.8 F | HEIGHT: 73 IN | HEART RATE: 139 BPM

## 2019-05-08 PROCEDURE — 99204 OFFICE O/P NEW MOD 45 MIN: CPT

## 2019-05-08 NOTE — PHYSICAL EXAM
[General Appearance - Well Developed] : well developed [General Appearance - Well Nourished] : well nourished [Normal Appearance] : normal appearance [Well Groomed] : well groomed [General Appearance - In No Acute Distress] : no acute distress [Edema] : no peripheral edema [Respiration, Rhythm And Depth] : normal respiratory rhythm and effort [Exaggerated Use Of Accessory Muscles For Inspiration] : no accessory muscle use [Auscultation Breath Sounds / Voice Sounds] : lungs were clear to auscultation bilaterally [Bowel Sounds] : normal bowel sounds [Abdomen Soft] : soft [Abdomen Tenderness] : non-tender [Abdomen Mass (___ Cm)] : no abdominal mass palpated [Abdomen Hernia] : no hernia was discovered [Costovertebral Angle Tenderness] : no ~M costovertebral angle tenderness [Urethral Meatus] : meatus normal [Penis Abnormality] : normal uncircumcised penis [Scrotum] : the scrotum was normal [Urinary Bladder Findings] : the bladder was normal on palpation [Epididymis] : the epididymides were normal [Testes Tenderness] : no tenderness of the testes [Testes Mass (___cm)] : there were no testicular masses [Anus Abnormality] : the anus and perineum were normal [Rectal Exam - Rectum] : digital rectal exam was normal [No Prostate Nodules] : no prostate nodules [Prostate Size ___ gm] : prostate size [unfilled] gm [FreeTextEntry1] : The prostate was tender on the right side 3-4/10; Bilateral grade 2 varicoceles present in the standing position which decompressed in the supine position. [Normal Station and Gait] : the gait and station were normal for the patient's age [] : no rash [No Focal Deficits] : no focal deficits [Oriented To Time, Place, And Person] : oriented to person, place, and time [Affect] : the affect was normal [Mood] : the mood was normal [Not Anxious] : not anxious [No Palpable Adenopathy] : no palpable adenopathy

## 2019-05-08 NOTE — ASSESSMENT
[FreeTextEntry1] : #1) prostatitis causing urgency and urethral burning.\par The patient was treated with levofloxacin 500 mg p.o. daily x4 weeks. He is also instructed to avoid all alcohol, spicy foods and caffeine. He was advised to take a sitz bath daily or to apply heat to the perineum.\par \par #2) bilateral testicular pain\par This is due to his bilateral varicoceles.\par He was advised to wear an athletic supporter on top of high cut briefs, lie flat when possible, elevate the scrotum when possible, applied cold pack to the area under the scrotum as needed, and take OTC Tylenol p.r.n. pain.\par \par RTO in 5 weeks for reevaluation.

## 2019-05-08 NOTE — HISTORY OF PRESENT ILLNESS
[FreeTextEntry1] : The patient is a 33-year-old gentleman was seen in the office today for the above. The symptoms started about one month ago. He said that the burning in the penis occurs just before urination and it is relieved by voiding. It has continued daily, even today.This penile burning is worse after ejaculation and after drinking alcohol.  His daytime frequency 6 times a day without nocturia. The pain in both testicles is sharp and also developed a month ago and is throbbing in character at a pain level of 10/10. It is intermittent and Tylenol relieves it slightly. He said he went to the emergency room at [facility] about 3 weeks ago for these symptoms. A scrotal sonogram was done and report disclosed bilateral microlithiasis without any discrete masses. He said that he was told that old blood work was normal. He then saw his PCP few days later voided more blood work and the patient reports that these tests were also negative. He denies all other urological and constitutional symptomatology.\par \par Past Urological History: Negative\par \par Urological Family History: Negative

## 2019-05-14 ENCOUNTER — MEDICATION RENEWAL (OUTPATIENT)
Age: 34
End: 2019-05-14

## 2019-05-21 ENCOUNTER — APPOINTMENT (OUTPATIENT)
Dept: INTERNAL MEDICINE | Facility: CLINIC | Age: 34
End: 2019-05-21

## 2019-06-05 ENCOUNTER — APPOINTMENT (OUTPATIENT)
Dept: CARDIOLOGY | Facility: CLINIC | Age: 34
End: 2019-06-05

## 2019-06-19 ENCOUNTER — APPOINTMENT (OUTPATIENT)
Dept: UROLOGY | Facility: CLINIC | Age: 34
End: 2019-06-19
Payer: COMMERCIAL

## 2019-06-19 DIAGNOSIS — N41.9 INFLAMMATORY DISEASE OF PROSTATE, UNSPECIFIED: ICD-10-CM

## 2019-06-19 DIAGNOSIS — N50.811 RIGHT TESTICULAR PAIN: ICD-10-CM

## 2019-06-19 DIAGNOSIS — I86.1 SCROTAL VARICES: ICD-10-CM

## 2019-06-19 DIAGNOSIS — N50.812 LEFT TESTICULAR PAIN: ICD-10-CM

## 2019-06-19 PROCEDURE — 99213 OFFICE O/P EST LOW 20 MIN: CPT

## 2019-06-19 NOTE — PHYSICAL EXAM
[General Appearance - Well Developed] : well developed [General Appearance - Well Nourished] : well nourished [Normal Appearance] : normal appearance [Well Groomed] : well groomed [] : no rash [General Appearance - In No Acute Distress] : no acute distress [Oriented To Time, Place, And Person] : oriented to person, place, and time [Mood] : the mood was normal [Affect] : the affect was normal [Normal Station and Gait] : the gait and station were normal for the patient's age [Not Anxious] : not anxious [No Focal Deficits] : no focal deficits

## 2019-06-19 NOTE — ASSESSMENT
[FreeTextEntry1] : #1) prostatitis: Almost resolved.\par Levaquin 500 mg daily x2 weeks as per patient request.\par \par #2) testicular pain: Resolved with conservative management of varicoceles\par \par RTO p.r.n.

## 2019-06-19 NOTE — HISTORY OF PRESENT ILLNESS
[FreeTextEntry1] : The patient is a 33-year-old gentleman who was seen in the office today for the above. He completed a four-week course of Levaquin and all of his symptoms have resolved except for some mild, minimal pressure on the scrotum. He is requesting another couple of weeks of antibiotics for this. The testicular pain resolved with the conservative management of varicoceles.\par He has no urological complaints at this time.\par \par His past medical history, surgical history, dictation history and allergy history are unchanged.

## 2019-07-24 ENCOUNTER — RX RENEWAL (OUTPATIENT)
Age: 34
End: 2019-07-24

## 2019-08-12 ENCOUNTER — APPOINTMENT (OUTPATIENT)
Dept: CARDIOLOGY | Facility: CLINIC | Age: 34
End: 2019-08-12

## 2019-09-17 ENCOUNTER — APPOINTMENT (OUTPATIENT)
Dept: INTERNAL MEDICINE | Facility: CLINIC | Age: 34
End: 2019-09-17

## 2019-11-18 ENCOUNTER — MEDICATION RENEWAL (OUTPATIENT)
Age: 34
End: 2019-11-18

## 2019-12-24 NOTE — ED ADULT NURSE NOTE - HOW MANY DRINKS CONTAINING ALCOHOL DO YOU HAVE ON A TYPICAL DAY WHEN YOU ARE DRINKING?
Patient Education     Viral Diarrhea (Child)    Diarrhea caused by a virus is called viral gastroenteritis. Many people call it the stomach flu, but it has nothing to do with the flu or influenza. This virus affects the stomach and intestinal tract. It usually lasts 2 to 7 days.  Diarrhea means passing loose watery stools 3 or more times a day. Your child may also have these symptoms:  · Abdominal pain and cramping  · Nausea  · Vomiting  · Loss of bowel control  · Fever and chills  · Bloody stools  The main danger from this illness is dehydration. This is the loss of too much water and minerals from the body. When this occurs, body fluids must be replaced. This can be done with oral rehydration solution. Oral rehydration solution is available at drugstores and most grocery stores.  Antibiotics are not effective for this illness.  Home care  Follow all instructions given by your child’s healthcare provider.  Giving medicines to your child  · Don’t give over-the-counter diarrhea medicines unless your child’s healthcare provider tells you to.  · You can use acetaminophen or ibuprofen to control pain and fever. Or, you can use other medicine as prescribed. Only use medicines for children. Never give adult medicines to children.  · Don't give aspirin to anyone under 18 years of age who has a fever. This may cause liver damage and a life-threatening condition called Reye syndrome.  Preventing the spread of illness  · Washing hands well with soap and water is the best way to prevent the spread of infection. Always wash your hands before and after caring for your sick child.  · Teach your child when and how to wash his or her hands.  · Use alcohol-based hand  if soap and water are not available.  · Clean the toilet after each use.  · Keep your child out of day care until he or she is cleared by the healthcare provider.  · Wash your hands before and after preparing food. Keep in mind that people with diarrhea or  vomiting should not prepare food for others.  · Wash your hands after using cutting boards, counter-tops, and knives that have been in contact with raw foods.  · Keep uncooked meats away from cooked and ready-to-eat foods.  Preventing dehydration  The main goal while treating vomiting or diarrhea is to prevent dehydration. This is done by giving your child small amounts of liquids often.  · Keep in mind that liquids are more important than food right now. Give small amounts of liquids at a time, especially if your child is having stomach cramps or vomiting.  · For diarrhea: If you are giving milk to your child and the diarrhea is not going away, stop the milk. In some cases, milk can make diarrhea worse. If that happens, use oral rehydration solution instead. Don’t give apple juice, soda, sports drinks, or other sweetened drinks. Drinks with sugar can make diarrhea worse.  · For vomiting: Start with oral rehydration solution at room temperature. Give 1 teaspoon (5 ml) every 1 to 2 minutes. Even if your child vomits, continue to give oral rehydration solution. Much of the liquid will be absorbed, despite the vomiting. After 2 hours with no vomiting, start with small amounts of milk or formula and other fluids. Increase the amount as tolerated. Don't give your child plain water, milk, formula, or other liquids until vomiting stops. As vomiting decreases, try giving larger amounts of oral rehydration solution. Space this out with more time in between. Continue this until your child is making urine and is no longer thirsty (has no interest in drinking). After 4 hours with no vomiting, restart solid foods. After 24 hours with no vomiting, resume a normal diet. If the vomiting can't be controlled with dietary measures, your doctor may prescribe an oral medicine to control vomiting.  · Your child can go back to eating normally as he or she feels better. Don’t force your child to eat, especially if he or she is having  stomach pain or cramping. Don’t feed your child large amounts at a time, even if your child is hungry. This can make your child feel worse. You can give your child more food over time if he or she can tolerate it. Foods that may be easier to digest include cereal, mashed potatoes, applesauce, mashed bananas, crackers, dry toast, rice, oatmeal, bread, noodles, pretzels, soups with rice or noodles, and cooked vegetables.  · If the symptoms come back, go back to a simple diet or clear liquids.  Follow-up care  Follow up with your child’s healthcare provider, or as advised. If a stool sample was taken or cultures were done, call the healthcare provider for the results as instructed.  When to seek medical advice  Unless your child's healthcare provider advises otherwise, call the provider right away if any of the following occur:  · Fever (see Fever and children, below)  · Signs of dehydration:  ? Very dark urine  ? Dry mouth  ? Increased thirst  ? Urinating 1 or fewer times in 6 hours  ? No tears when crying  ? Sunken eyes  · Abdominal pain that gets worse  · Constant lower right abdominal pain  · Repeated vomiting after the first 2 hours on liquids  · Occasional vomiting for more than 24 hours  · Continued severe diarrhea for more than 24 hours  · Blood in vomit or stool  · Refusal to drink or feed  · Fussiness or crying that can't be soothed  · Unusual drowsiness  · New rash  · More than 8 diarrhea stools within 8 hours  · Diarrhea lasts more than 1 week on antibiotics  Call 911  Call 911 if your child has any of these symptoms:  · Trouble breathing  · Confusion  · Extreme drowsiness or trouble walking  · Loss of consciousness  · Rapid heart rate  · Stiff neck  · Seizure  Fever and children  Always use a digital thermometer to check your child’s temperature. Never use a mercury thermometer.  For infants and toddlers, be sure to use a rectal thermometer correctly. A rectal thermometer may accidentally poke a hole in  (perforate) the rectum. It may also pass on germs from the stool. Always follow the product maker’s directions for proper use. If you don’t feel comfortable taking a rectal temperature, use another method. When you talk to your child’s healthcare provider, tell him or her which method you used to take your child’s temperature.  Here are guidelines for fever temperature. Ear temperatures aren’t accurate before 6 months of age. Don’t take an oral temperature until your child is at least 4 years old.  Infant under 3 months old:  · Ask your child’s healthcare provider how you should take the temperature.  · Rectal or forehead (temporal artery) temperature of 100.4°F (38°C) or higher, or as directed by the provider  · Armpit temperature of 99°F (37.2°C) or higher, or as directed by the provider  Child age 3 to 36 months:  · Rectal, forehead (temporal artery), or ear temperature of 102°F (38.9°C) or higher, or as directed by the provider  · Armpit temperature of 101°F (38.3°C) or higher, or as directed by the provider  Child of any age:  · Repeated temperature of 104°F (40°C) or higher, or as directed by the provider  · Fever that lasts more than 24 hours in a child under 2 years old. Or a fever that lasts for 3 days in a child 2 years or older.   Date Last Reviewed: 3/1/2018  © 2787-7232 [employer]. [address] [place] [address], [place] [address]. All rights reserved. This information is not intended as a substitute for professional medical care. Always follow your healthcare professional's instructions.            1 or 2

## 2020-02-20 ENCOUNTER — EMERGENCY (EMERGENCY)
Facility: HOSPITAL | Age: 35
LOS: 1 days | Discharge: HOME | End: 2020-02-20
Attending: EMERGENCY MEDICINE
Payer: SELF-PAY

## 2020-02-20 VITALS
SYSTOLIC BLOOD PRESSURE: 153 MMHG | RESPIRATION RATE: 20 BRPM | TEMPERATURE: 97 F | HEART RATE: 99 BPM | OXYGEN SATURATION: 99 % | DIASTOLIC BLOOD PRESSURE: 98 MMHG

## 2020-02-20 VITALS — WEIGHT: 220.02 LBS | HEIGHT: 73 IN

## 2020-02-20 PROCEDURE — 99283 EMERGENCY DEPT VISIT LOW MDM: CPT | Mod: 25

## 2020-02-20 PROCEDURE — 99284 EMERGENCY DEPT VISIT MOD MDM: CPT

## 2020-02-20 PROCEDURE — 96372 THER/PROPH/DIAG INJ SC/IM: CPT

## 2020-02-20 RX ORDER — METHOCARBAMOL 500 MG/1
1 TABLET, FILM COATED ORAL
Qty: 15 | Refills: 0
Start: 2020-02-20 | End: 2020-02-24

## 2020-02-20 RX ORDER — METHOCARBAMOL 500 MG/1
1500 TABLET, FILM COATED ORAL ONCE
Refills: 0 | Status: COMPLETED | OUTPATIENT
Start: 2020-02-20 | End: 2020-02-20

## 2020-02-20 RX ORDER — KETOROLAC TROMETHAMINE 30 MG/ML
1 SYRINGE (ML) INJECTION
Qty: 18 | Refills: 0
Start: 2020-02-20 | End: 2020-02-22

## 2020-02-20 RX ORDER — KETOROLAC TROMETHAMINE 30 MG/ML
30 SYRINGE (ML) INJECTION ONCE
Refills: 0 | Status: DISCONTINUED | OUTPATIENT
Start: 2020-02-20 | End: 2020-02-20

## 2020-02-20 RX ADMIN — Medication 30 MILLIGRAM(S): at 12:06

## 2020-02-20 RX ADMIN — METHOCARBAMOL 1500 MILLIGRAM(S): 500 TABLET, FILM COATED ORAL at 12:06

## 2020-02-20 NOTE — ED PROVIDER NOTE - PHYSICAL EXAMINATION
PHYSICAL EXAM:  GENERAL: NAD  HEENT:  NCAT, PERRLA, neck supple without tenderness, no JVD  HEART: RRR, normal S1, S2, no MRG  CHEST/LUNG: CTA b/l, no crackles/wheezes/rhonchi  ABDOMEN: Soft, nontender, nondistended  NEURO: A&O X3, Motor Strength 5/5 B/L upper and lower extremities, DTRs 2+ intact and symmetric  EXT: 2+ peripheral pulses, warm well-perfused  LYMPH: No lymphadenopathy noted  SKIN: No rashes or lesions PHYSICAL EXAM:  GENERAL: NAD  HEENT:  NCAT, PERRLA, neck supple without tenderness, no JVD  HEART: RRR, normal S1, S2, no MRG  CHEST/LUNG: CTA b/l, no crackles/wheezes/rhonchi  ABDOMEN: Soft, nontender, nondistended  NEURO: A&O X3, Motor Strength 5/5 B/L upper and lower extremities, no ambulation abnormalities, DTRs 2+ intact and symmetric  BACK: no spinal tenderness  EXT: 2+ peripheral pulses, warm well-perfused  LYMPH: No lymphadenopathy noted  SKIN: No rashes or lesions PHYSICAL EXAM:  GENERAL: NAD  HEENT:  NCAT, PERRLA, neck supple without tenderness, no JVD, b/l TM without abnormality  HEART: RRR, normal S1, S2, no MRG  CHEST/LUNG: CTA b/l, no crackles/wheezes/rhonchi  ABDOMEN: Soft, nontender, nondistended  NEURO: A&O X3, Motor Strength 5/5 B/L upper and lower extremities, no ambulation deficits, DTRs 2+ intact and symmetric  BACK: no spinal tenderness  EXT: 2+ peripheral pulses, warm well-perfused  LYMPH: No lymphadenopathy noted  SKIN: No rashes or lesions PHYSICAL EXAM:  GENERAL: NAD  HEENT:  NCAT, PERRLA, neck supple without tenderness, no JVD, b/l TM without abnormality  HEART: RRR, normal S1, S2, no MRG  CHEST/LUNG: CTA b/l, no crackles/wheezes/rhonchi  ABDOMEN: Soft, nontender, nondistended  NEURO: A&O X3, Motor Strength 5/5 B/L upper and lower extremities, no ambulation deficits, DTRs 2+ intact and symmetric; Sensation equal and intact b/l LE;  no clonus  BACK: no spinal tenderness  EXT: 2+ peripheral pulses, warm well-perfused  LYMPH: No cervical lymphadenopathy noted  SKIN: No rashes or lesions

## 2020-02-20 NOTE — ED ADULT TRIAGE NOTE - CHIEF COMPLAINT QUOTE
Patient states that he his having pain to his left lower back since Friday with radiation down his left leg. Pt states that the pain has been getting progressively worse each day. Denies any Trauma, urinary incontinence, numbness/tingling, fevers, steroid use, IV drug use or hx of cancers. Pt also c/o left ear pain x months.

## 2020-02-20 NOTE — ED PROVIDER NOTE - NSFOLLOWUPINSTRUCTIONS_ED_ALL_ED_FT
please follow up with Spine for further evaluation. You may take toradol as directed;  you may take ibuprofen 600mg every 6 hours after that runs out. You may take robaxin as directed for muscle spasm.  Any worsening pain/ new or concerning symptoms, return to ER right away

## 2020-02-20 NOTE — ED PROVIDER NOTE - CLINICAL SUMMARY MEDICAL DECISION MAKING FREE TEXT BOX
Pt 33 y/o M smoker hypertensive, presents with 6d back pain associated with left leg pain. Pt presentation most consistent with LLE radicular pain, potentially musculoskeletal strain and/or lumbar disk herniation. Pt without urinary/fecal incontinence, saddle anesthesia, fever, chills. At this time will treat supportively hold on imaging. Pt 33 y/o M smoker hypertensive, presents with 6d back pain associated with left leg pain. Pt presentation most consistent with LLE radicular pain, potentially musculoskeletal strain and/or lumbar disk herniation. Pt without urinary/fecal incontinence, saddle anesthesia, fever, chills. At this time will treat supportively hold on imaging and encourage patient to follow as outpt spine specialist. Pt 33 y/o M smoker hypertensive, presents with 6d back pain associated with left leg pain. Pt presentation most consistent with LLE radicular pain, potentially musculoskeletal strain and/or lumbar disk herniation. Pt without urinary/fecal incontinence, saddle anesthesia, fever, chills.  Ambulatory in ED;  no midline spinal tenderness. At this time will treat supportively, outpatient spine referral.

## 2020-02-20 NOTE — ED PROVIDER NOTE - NS ED ROS FT
Constitutional: No fever, chills, fatigue  Eyes: No eye pain, blurry vision  ENT: +ear pain, no congestion or sore throat.  Cardiovascular: No chest pain, palpitations, syncope  Respiratory: No cough, congestion, or wheezing.  Gastrointestinal: No abdominal pain, nausea, vomiting, diarrhea  Genitourinary: No dysuria, hematuria  Musculoskeletal: +back pain, +L leg pain, no joint swelling, joint pain  Neurologic: No headache, focal weakness  Skin: No rashes, hematoma, purpura

## 2020-02-20 NOTE — ED PROVIDER NOTE - PATIENT PORTAL LINK FT
You can access the FollowMyHealth Patient Portal offered by [facility] by registering at the following website: [email or web address]. By joining [employer]’s FollowMyHealth portal, you will also be able to view your health information using other applications (apps) compatible with our system.

## 2020-02-20 NOTE — ED PROVIDER NOTE - OBJECTIVE STATEMENT
Pt is a 35 y/o M smoker with hx of HTN presenting to the ED with 6d of constant left lower back tightness associated with shooting left leg pain worse with walking, standing, back extension. Pt was eating dinner when symptoms began, tried Tylenol without relief and endorses worsening of pain over past few days. Pt denies heavy lifting, although says he used to weight train years ago. Pt also endorses 6mo of left ear pressure, feeling he has to "pop" ears often. Patient otherwise denies fever, chills, congestion, cough, sore throat, chest pain, shortness of breath, abdominal pain, nausea, vomiting, diarrhea. Pt is a 35 y/o M smoker with hx of HTN presenting to the ED with 6d of constant left lower back tightness associated with shooting left leg pain worse with walking, standing, back extension. Pt was eating dinner when he stood up and suddenly felt lower back pain. Over past few days, he tried Tylenol without relief and endorses worsening of pain over past few days. Pt denies heavy lifting, although says he used to weight train years ago. Pt also endorses 6mo of left ear pressure, feeling he has to "pop" ears often. Patient otherwise denies fever, chills, congestion, cough, sore throat, chest pain, shortness of breath, abdominal pain, nausea, vomiting, diarrhea. Pt is a 33 y/o M smoker with hx of HTN presenting to the ED with 6d of constant left lower back tightness associated with shooting left leg pain worse with walking, standing, back extension. Pt was eating dinner when he stood up and suddenly felt lower back pain. Pt tried Tylenol without relief and endorses worsening of pain over past few days. Pt denies heavy lifting, although says he used to weight train years ago. Pt also endorses 6mo of left ear pressure, feeling he has to "pop" ears often. Patient otherwise denies fever, chills, congestion, cough, sore throat, chest pain, shortness of breath, abdominal pain, nausea, vomiting, diarrhea. Pt is a 35 y/o M smoker with hx of HTN, sciatica ? R sided presenting to the ED with 6d of constant left lower back tightness associated with shooting left leg pain worse with walking, standing, back extension. Pt was eating dinner when he stood up and suddenly felt lower back pain. Pt tried Tylenol / motrin/ heat packs, without relief and endorses worsening of pain over past few days. Pt denies heavy lifting, although says he used to weight train years ago. Pt also endorses 6mo of left ear pressure, feeling he has to "pop" ears often. Patient otherwise denies fever, chills, wt loss, recent heavy lifting; congestion, cough, sore throat, chest pain, shortness of breath, abdominal pain, nausea, vomiting, diarrhea.

## 2020-02-20 NOTE — ED PROVIDER NOTE - ATTENDING CONTRIBUTION TO CARE
Dr. Briscoe: I have personally seen and examined this patient at the bedside. I have fully participated in the care of this patient. I have reviewed all pertinent clinical information, including history, physical exam, plan and the med student's note and agree except as noted. HPI above as by me. PE above as by me.

## 2020-02-20 NOTE — ED PROVIDER NOTE - CARE PROVIDER_API CALL
Roland Fields)  Neurosurgery  [address] [place] [address]  Phone: [phone]  Fax: [phone]  Follow Up Time:

## 2020-07-11 ENCOUNTER — EMERGENCY (EMERGENCY)
Facility: HOSPITAL | Age: 35
LOS: 0 days | Discharge: HOME | End: 2020-07-11
Payer: MEDICARE

## 2020-07-11 VITALS
HEART RATE: 62 BPM | SYSTOLIC BLOOD PRESSURE: 119 MMHG | RESPIRATION RATE: 18 BRPM | DIASTOLIC BLOOD PRESSURE: 70 MMHG | TEMPERATURE: 100 F | OXYGEN SATURATION: 95 %

## 2020-07-11 DIAGNOSIS — Z20.828 CONTACT WITH AND (SUSPECTED) EXPOSURE TO OTHER VIRAL COMMUNICABLE DISEASES: ICD-10-CM

## 2020-07-11 DIAGNOSIS — Z11.59 ENCOUNTER FOR SCREENING FOR OTHER VIRAL DISEASES: ICD-10-CM

## 2020-07-11 PROCEDURE — U0003: CPT

## 2020-07-11 PROCEDURE — 99283 EMERGENCY DEPT VISIT LOW MDM: CPT

## 2020-07-11 NOTE — ED STATDOCS - PATIENT PORTAL LINK FT
You can access the FollowMyHealth Patient Portal offered by [facility] by registering at the following website: [email or web address]. By joining [facility]’s FollowMyHealth portal, you will also be able to view your health information using other applications (apps) compatible with our system.

## 2020-07-11 NOTE — ED STATDOCS - OBJECTIVE STATEMENT
Pt presents to ED with fever, cough, runny nose, body aches, sore throat x  days. Pt recently exposed to COVID-19. Pt here for testing. 34 yr. old male  presents to ED with no fever, no cough, no runny nose, no body aches, no sore throat . Pt recently exposed to COVID-19. Pt here for testing.

## 2020-07-12 LAB — SARS-COV-2 RNA SPEC QL NAA+PROBE: SIGNIFICANT CHANGE UP

## 2020-12-07 ENCOUNTER — APPOINTMENT (OUTPATIENT)
Dept: INTERNAL MEDICINE | Facility: CLINIC | Age: 35
End: 2020-12-07

## 2020-12-15 ENCOUNTER — RX RENEWAL (OUTPATIENT)
Age: 35
End: 2020-12-15

## 2021-01-08 ENCOUNTER — RX RENEWAL (OUTPATIENT)
Age: 36
End: 2021-01-08

## 2021-01-13 ENCOUNTER — NON-APPOINTMENT (OUTPATIENT)
Age: 36
End: 2021-01-13

## 2021-01-13 ENCOUNTER — APPOINTMENT (OUTPATIENT)
Dept: INTERNAL MEDICINE | Facility: CLINIC | Age: 36
End: 2021-01-13
Payer: MEDICAID

## 2021-01-13 VITALS
BODY MASS INDEX: 31.41 KG/M2 | DIASTOLIC BLOOD PRESSURE: 105 MMHG | WEIGHT: 237 LBS | RESPIRATION RATE: 16 BRPM | OXYGEN SATURATION: 97 % | SYSTOLIC BLOOD PRESSURE: 152 MMHG | TEMPERATURE: 98.3 F | HEART RATE: 112 BPM | HEIGHT: 73 IN

## 2021-01-13 DIAGNOSIS — E66.9 OBESITY, UNSPECIFIED: ICD-10-CM

## 2021-01-13 DIAGNOSIS — R04.0 EPISTAXIS: ICD-10-CM

## 2021-01-13 DIAGNOSIS — Z91.14 PATIENT'S OTHER NONCOMPLIANCE WITH MEDICATION REGIMEN: ICD-10-CM

## 2021-01-13 DIAGNOSIS — Z91.11 PATIENT'S NONCOMPLIANCE WITH DIETARY REGIMEN: ICD-10-CM

## 2021-01-13 DIAGNOSIS — R10.9 UNSPECIFIED ABDOMINAL PAIN: ICD-10-CM

## 2021-01-13 DIAGNOSIS — R06.02 SHORTNESS OF BREATH: ICD-10-CM

## 2021-01-13 DIAGNOSIS — Z00.00 ENCOUNTER FOR GENERAL ADULT MEDICAL EXAMINATION W/OUT ABNORMAL FINDINGS: ICD-10-CM

## 2021-01-13 DIAGNOSIS — Z13.31 ENCOUNTER FOR SCREENING FOR DEPRESSION: ICD-10-CM

## 2021-01-13 PROCEDURE — 99214 OFFICE O/P EST MOD 30 MIN: CPT | Mod: 25

## 2021-01-13 PROCEDURE — 99395 PREV VISIT EST AGE 18-39: CPT | Mod: 25

## 2021-01-13 PROCEDURE — 36415 COLL VENOUS BLD VENIPUNCTURE: CPT

## 2021-01-13 PROCEDURE — 99072 ADDL SUPL MATRL&STAF TM PHE: CPT

## 2021-01-13 PROCEDURE — 93000 ELECTROCARDIOGRAM COMPLETE: CPT

## 2021-01-13 PROCEDURE — 96127 BRIEF EMOTIONAL/BEHAV ASSMT: CPT

## 2021-01-13 RX ORDER — LEVOFLOXACIN 500 MG/1
500 TABLET, FILM COATED ORAL
Qty: 14 | Refills: 0 | Status: DISCONTINUED | COMMUNITY
Start: 2019-05-08 | End: 2021-01-13

## 2021-01-14 LAB
APPEARANCE: CLEAR
BACTERIA: NEGATIVE
BILIRUBIN URINE: NEGATIVE
BLOOD URINE: NEGATIVE
COLOR: NORMAL
GLUCOSE QUALITATIVE U: NEGATIVE
HYALINE CASTS: 0 /LPF
KETONES URINE: NEGATIVE
LEUKOCYTE ESTERASE URINE: NEGATIVE
MICROSCOPIC-UA: NORMAL
NITRITE URINE: NEGATIVE
PH URINE: 5.5
PROTEIN URINE: NEGATIVE
RED BLOOD CELLS URINE: 0 /HPF
SPECIFIC GRAVITY URINE: 1.02
SQUAMOUS EPITHELIAL CELLS: 0 /HPF
UROBILINOGEN URINE: NORMAL
WHITE BLOOD CELLS URINE: 0 /HPF

## 2021-01-18 PROBLEM — Z91.14 NONCOMPLIANCE WITH MEDICATIONS: Status: ACTIVE | Noted: 2021-01-18

## 2021-01-18 PROBLEM — Z13.31 DEPRESSION SCREENING: Status: ACTIVE | Noted: 2021-01-18

## 2021-01-18 PROBLEM — R04.0 EPISTAXIS: Status: ACTIVE | Noted: 2021-01-13

## 2021-01-18 PROBLEM — E66.9 OBESITY (BMI 30.0-34.9): Status: ACTIVE | Noted: 2021-01-18

## 2021-01-18 PROBLEM — R10.9 FLANK PAIN: Status: ACTIVE | Noted: 2021-01-13

## 2021-01-18 PROBLEM — Z91.11 NONCOMPLIANCE WITH TREATMENT PLAN: Status: ACTIVE | Noted: 2021-01-18

## 2021-01-18 PROBLEM — R06.02 SOB (SHORTNESS OF BREATH): Status: ACTIVE | Noted: 2021-01-13

## 2021-01-18 LAB
25(OH)D3 SERPL-MCNC: 21.3 NG/ML
ALBUMIN SERPL ELPH-MCNC: 4.8 G/DL
ALP BLD-CCNC: 98 U/L
ALT SERPL-CCNC: 44 U/L
ANION GAP SERPL CALC-SCNC: 15 MMOL/L
AST SERPL-CCNC: 30 U/L
BASOPHILS # BLD AUTO: 0.04 K/UL
BASOPHILS NFR BLD AUTO: 0.4 %
BILIRUB SERPL-MCNC: 0.2 MG/DL
BUN SERPL-MCNC: 19 MG/DL
CALCIUM SERPL-MCNC: 10.1 MG/DL
CHLORIDE SERPL-SCNC: 100 MMOL/L
CHOLEST SERPL-MCNC: 179 MG/DL
CO2 SERPL-SCNC: 25 MMOL/L
CREAT SERPL-MCNC: 0.99 MG/DL
CREAT SPEC-SCNC: 69 MG/DL
CREAT/PROT UR: 0.1 RATIO
EOSINOPHIL # BLD AUTO: 0.2 K/UL
EOSINOPHIL NFR BLD AUTO: 2.1 %
ESTIMATED AVERAGE GLUCOSE: 100 MG/DL
GLUCOSE SERPL-MCNC: 93 MG/DL
HBA1C MFR BLD HPLC: 5.1 %
HCT VFR BLD CALC: 54.9 %
HDLC SERPL-MCNC: 55 MG/DL
HGB BLD-MCNC: 17.9 G/DL
IMM GRANULOCYTES NFR BLD AUTO: 0.6 %
LDLC SERPL CALC-MCNC: 96 MG/DL
LYMPHOCYTES # BLD AUTO: 3.25 K/UL
LYMPHOCYTES NFR BLD AUTO: 34 %
MAN DIFF?: NORMAL
MCHC RBC-ENTMCNC: 30.9 PG
MCHC RBC-ENTMCNC: 32.6 GM/DL
MCV RBC AUTO: 94.8 FL
MONOCYTES # BLD AUTO: 0.83 K/UL
MONOCYTES NFR BLD AUTO: 8.7 %
NEUTROPHILS # BLD AUTO: 5.18 K/UL
NEUTROPHILS NFR BLD AUTO: 54.2 %
NONHDLC SERPL-MCNC: 125 MG/DL
PLATELET # BLD AUTO: 269 K/UL
POTASSIUM SERPL-SCNC: 4.4 MMOL/L
PROT SERPL-MCNC: 7.6 G/DL
PROT UR-MCNC: 4 MG/DL
RBC # BLD: 5.79 M/UL
RBC # FLD: 12.2 %
SODIUM SERPL-SCNC: 140 MMOL/L
T4 FREE SERPL-MCNC: 1.2 NG/DL
TRIGL SERPL-MCNC: 145 MG/DL
TSH SERPL-ACNC: 1.6 UIU/ML
WBC # FLD AUTO: 9.56 K/UL

## 2021-01-18 NOTE — PHYSICAL EXAM
[No Acute Distress] : no acute distress [Well Nourished] : well nourished [Well Developed] : well developed [Well-Appearing] : well-appearing [Normal Voice/Communication] : normal voice/communication [Normal Sclera/Conjunctiva] : normal sclera/conjunctiva [PERRL] : pupils equal round and reactive to light [EOMI] : extraocular movements intact [Normal Outer Ear/Nose] : the outer ears and nose were normal in appearance [Normal Oropharynx] : the oropharynx was normal [Normal TMs] : both tympanic membranes were normal [Normal Nasal Mucosa] : the nasal mucosa was normal [No JVD] : no jugular venous distention [No Lymphadenopathy] : no lymphadenopathy [Supple] : supple [Thyroid Normal, No Nodules] : the thyroid was normal and there were no nodules present [No Respiratory Distress] : no respiratory distress  [No Accessory Muscle Use] : no accessory muscle use [Normal Rate] : normal rate  [Clear to Auscultation] : lungs were clear to auscultation bilaterally [Regular Rhythm] : with a regular rhythm [Normal S1, S2] : normal S1 and S2 [No Murmur] : no murmur heard [No Carotid Bruits] : no carotid bruits [No Abdominal Bruit] : a ~M bruit was not heard ~T in the abdomen [No Varicosities] : no varicosities [No Edema] : there was no peripheral edema [No Palpable Aorta] : no palpable aorta [No Extremity Clubbing/Cyanosis] : no extremity clubbing/cyanosis [Soft] : abdomen soft [Non Tender] : non-tender [Non-distended] : non-distended [No Masses] : no abdominal mass palpated [No HSM] : no HSM [Normal Bowel Sounds] : normal bowel sounds [No CVA Tenderness] : no CVA  tenderness [No Spinal Tenderness] : no spinal tenderness [No Joint Swelling] : no joint swelling [No Rash] : no rash [No Skin Lesions] : no skin lesions [No Focal Deficits] : no focal deficits [Normal Affect] : the affect was normal [Alert and Oriented x3] : oriented to person, place, and time [Normal Mood] : the mood was normal [Normal Insight/Judgement] : insight and judgment were intact

## 2021-01-18 NOTE — HEALTH RISK ASSESSMENT
[] : Yes [Yes] : Yes [No] : In the past 12 months have you used drugs other than those required for medical reasons? No [0] : 2) Feeling down, depressed, or hopeless: Not at all (0) [HIV test declined] : HIV test declined [Employed] : employed [Significant Other] : lives with significant other [de-identified] : 1PPD [[id number]] : 0 [de-identified] : drinks heavily 2 days a week

## 2021-01-18 NOTE — ASSESSMENT
[FreeTextEntry1] : \par Uncontrolled HTN:\par -risks of uncontrolled HTN discussed with him at length\par -He has been noncompliant with follow up\par -He states he is recently restarted amlodipine 10mg PO daily  \par -will add losartan 25 mg daily  (R/B/A/side effects discussed)\par -Fasting labs ordered\par -I advised low fat/low cholesterol diet, low salt diet, and weight loss\par -I have advised he cut down on caffeine intake\par -I have advised that he call me in 1 week with blood pressure log to go over numbers\par -EKG today sinus tach at 101, LVH, no ST abnormalities\par -f/u 4 weeks for BP check\par -If he develops headache, chest pain, shortness of breath he is to go to ER\par -I have advised he not use anabolic steroids-risks discussed\par \par Flank pain bilaterally:\par -check labs\par -Check renal ultrasound\par \par Shortness of breath:\par -Mild intermittent symptoms\par -May be due to weight gain\par -will check labs\par -EKG done today as above\par -will check CXR\par -I have advised that he see cardiology–referral given\par \par Obesity\par -BMI 31\par -risks of obesity discussed\par -benefits of weight loss discussed\par -low fat/low chol diet and low carbohydrate diet advised\par -small portion sizes encouraged\par -I advised avoidance of sugary drinks\par -weight loss advised\par -She reports he plans on having gastric sleeve bariatric surgery done in the [place] next month.  He states that he hopes bariatric surgery will prevent him from having to eat healthy and dieting in the future\par -I explained to him that it had a BMI of 31 he likely does not qualify according to [place] standards\par -I advised him that if his only reason for having bariatric surgery done is so that he does not have to eat healthy but this is likely not a good idea\par -I also advised him that given uncontrolled hypertension and shortness of breath he should see cardiology for evaluation prior to considering any surgical procedure.  He reports that his surgeon in the [place] told him that uncontrolled hypertension is not a contraindication to having surgery done.  I explained to him that I disagreed and as this is an elective surgery he should see cardiology first and get blood pressure under better control prior to considering any elective procedure\par -He reported that he understood my recommendations\par \par Mod alcohol use:\par -he states he does not drink every day but does drink more heavily on weekends and when he goes out\par -I advised him to limit alcoholic beverages to no more that 2 per day\par -risks of alcohol abuse discussed\par -I explained to him that his moderate alcohol use could be contributing to his weight gain and his uncontrolled hypertension\par \par Elevated LFTs:\par -will check labs\par -I previously ordered abdominal ultrasound but he never had done\par \par Smoker:\par -smoking cessation advised\par \par Epistaxis:\par -No significant findings on exam today\par -will check labs\par -will continue to monitor\par \par HCM:\par \par CPE: 2021\par \par EKG 2021\par \par Influenza vaccine advised: 2021-he declined\par \par Tdap:  10/14/2014\par \par Hep B series: advised previously\par \par HIV testing  10/14/2014-negative.  Offered again today 2021-he declined\par \par Hepatitis C: negative 10/2014\par \par Depression screenin2021 PHQ 2 score 0-negative\par \par F/U 4 weeks for BP check.  Fasting labs drawn in the office today\par

## 2021-01-18 NOTE — REVIEW OF SYSTEMS
[Nosebleeds] : nosebleeds [Shortness Of Breath] : shortness of breath [Negative] : Neurological [Fever] : no fever [Chills] : no chills [Fatigue] : no fatigue [Earache] : no earache [Nasal Discharge] : no nasal discharge [Sore Throat] : no sore throat [Chest Pain] : no chest pain [Palpitations] : no palpitations [Lower Ext Edema] : no lower extremity edema [Wheezing] : no wheezing [Cough] : no cough [Dyspnea on Exertion] : not dyspnea on exertion [Abdominal Pain] : no abdominal pain [Nausea] : no nausea [Diarrhea] : no diarrhea [Vomiting] : no vomiting [Anxiety] : no anxiety [Depression] : no depression [FreeTextEntry2] : 23 pound weight gain noted [FreeTextEntry6] : See HPI

## 2021-01-18 NOTE — HISTORY OF PRESENT ILLNESS
[de-identified] : Here for CPE\par \par He is here today with his significant other\par \par He was last seen in the office April 2019\par \par He reports she had been noncompliant with his blood pressure medication over the last year and a half.  He states he has recently restarted taking his amlodipine 1 month ago but his blood pressures at home remain elevated.  He states he checked his blood pressure this morning and it was 154/110 and 178/100\par \par He denies headache, chest pain, shortness of breath, palpitations\par \par He reports he occasionally gets "pain in his kidneys ".  He states this occurs from time to time.  He denies any urinary complaints.  He denies abdominal pain, nausea, vomiting, diarrhea.  No fever or chills reported\par \par He reports he occasionally feels a little short of breath and winded.  He denies chest pain.  He denies lower extremity edema.  He denies palpitations or dizziness\par \par He states he occasionally gets nosebleeds\par \par She reports he plans on having gastric sleeve bariatric surgery done in the [place] next month.  His BMI is 31.  He states he wants to have surgery done because he is tired of eating healthy and dieting.\par \par He reports he was doing anabolic steroids up until 1 month ago\par \par

## 2021-01-19 ENCOUNTER — APPOINTMENT (OUTPATIENT)
Dept: RADIOLOGY | Facility: CLINIC | Age: 36
End: 2021-01-19

## 2021-01-19 ENCOUNTER — APPOINTMENT (OUTPATIENT)
Dept: ULTRASOUND IMAGING | Facility: CLINIC | Age: 36
End: 2021-01-19

## 2021-01-19 ENCOUNTER — NON-APPOINTMENT (OUTPATIENT)
Age: 36
End: 2021-01-19

## 2021-01-19 ENCOUNTER — APPOINTMENT (OUTPATIENT)
Dept: CARDIOLOGY | Facility: CLINIC | Age: 36
End: 2021-01-19

## 2021-01-28 ENCOUNTER — APPOINTMENT (OUTPATIENT)
Dept: ULTRASOUND IMAGING | Facility: CLINIC | Age: 36
End: 2021-01-28

## 2021-01-28 ENCOUNTER — APPOINTMENT (OUTPATIENT)
Dept: RADIOLOGY | Facility: CLINIC | Age: 36
End: 2021-01-28

## 2021-02-03 ENCOUNTER — APPOINTMENT (OUTPATIENT)
Dept: INTERNAL MEDICINE | Facility: CLINIC | Age: 36
End: 2021-02-03

## 2021-02-15 ENCOUNTER — APPOINTMENT (OUTPATIENT)
Dept: CARDIOLOGY | Facility: CLINIC | Age: 36
End: 2021-02-15
Payer: MEDICAID

## 2021-02-15 VITALS
BODY MASS INDEX: 31.68 KG/M2 | HEIGHT: 73 IN | SYSTOLIC BLOOD PRESSURE: 100 MMHG | DIASTOLIC BLOOD PRESSURE: 70 MMHG | HEART RATE: 97 BPM | WEIGHT: 239 LBS | OXYGEN SATURATION: 98 % | TEMPERATURE: 98.6 F | RESPIRATION RATE: 16 BRPM

## 2021-02-15 DIAGNOSIS — R07.9 CHEST PAIN, UNSPECIFIED: ICD-10-CM

## 2021-02-15 DIAGNOSIS — R06.00 DYSPNEA, UNSPECIFIED: ICD-10-CM

## 2021-02-15 PROCEDURE — 99204 OFFICE O/P NEW MOD 45 MIN: CPT

## 2021-02-15 PROCEDURE — 99072 ADDL SUPL MATRL&STAF TM PHE: CPT

## 2021-02-15 PROCEDURE — 93000 ELECTROCARDIOGRAM COMPLETE: CPT

## 2021-02-15 NOTE — ASSESSMENT
[FreeTextEntry1] : ECG- SR at 97 bpm, LVH, may be normal variant. \par \par 1/18/21\par Chol. 179\par HDL 55\par LDL 96\par Tri. 145\par Creat.  0.99\par A1C 5.1\par HGB 17.9\par \par \par Impression:\par 35 year old male with longstanding history of elevated blood pressure and smoking, presenting with concerns of  SOB at rest and chest discomfort. Denies personal hx of HLD, DM, palps, edema or orthopnea. Currently an every day smoker approx. 1 pack QOD for the last 21 years. \par \par 1. Air hunger like sensations which may be related to his work related stress and improves with taking a deep breath or yawning. Most unlikely to be cardiac or pulmonary in nature\par \par 2. Atypical chest pain sensation which last seconds and does not worsen with exertion\par \par 3. HTN now being effectively  treated.  Reportedly had high blood pressure for 19 years during which he elected not to be treated.\par \par 4. ECG today SR and WNL\par \par 5. Lipids controlled.  BMI 31 with 60 lbs weight loss over the last several year. Denies any exertional discomfort when exercising\par \par 6. family hx negative for CAD that he is aware of. \par \par \par Plan\par 1. To establish baseline cardiac fxn and to assess for any LVH due to years of uncontrolled HTN will schedule Mr. Barajas for an echo.\par \par 2. Reassurance given.\par \par 3. BW through PCP\par \par 4. Diet and exercise. Must avoid any hidden NA containing foods.\par \par 5.  With further weight loss might be able to wean down the antihypertensive regimen in the near future\par Clinical follow up after echo is completed. \par \par \par \par

## 2021-02-15 NOTE — PHYSICAL EXAM
[General Appearance - Well Developed] : well developed [Normal Appearance] : normal appearance [General Appearance - Well Nourished] : well nourished [Normal Conjunctiva] : the conjunctiva exhibited no abnormalities [Normal Oral Mucosa] : normal oral mucosa [No Oral Pallor] : no oral pallor [No Oral Cyanosis] : no oral cyanosis [Normal Oropharynx] : normal oropharynx [Normal Jugular Venous A Waves Present] : normal jugular venous A waves present [Normal Jugular Venous V Waves Present] : normal jugular venous V waves present [No Jugular Venous Farley A Waves] : no jugular venous farley A waves [Heart Rate And Rhythm] : heart rate and rhythm were normal [Heart Sounds] : normal S1 and S2 [Arterial Pulses Normal] : the arterial pulses were normal [Murmurs] : no murmurs present [Edema] : no peripheral edema present [Veins - Varicosity Changes] : no varicosital changes were noted in the lower extremities [Respiration, Rhythm And Depth] : normal respiratory rhythm and effort [Exaggerated Use Of Accessory Muscles For Inspiration] : no accessory muscle use [Chest Palpation] : palpation of the chest revealed no abnormalities [Auscultation Breath Sounds / Voice Sounds] : lungs were clear to auscultation bilaterally [Lungs Percussion] : the lungs were normal to percussion [Bowel Sounds] : normal bowel sounds [Abdomen Soft] : soft [Abdomen Tenderness] : non-tender [Abdomen Mass (___ Cm)] : no abdominal mass palpated [Abdomen Hernia] : no hernia was discovered [Gait - Sufficient For Exercise Testing] : the gait was sufficient for exercise testing [Abnormal Walk] : normal gait [Nail Clubbing] : no clubbing of the fingernails [Cyanosis, Localized] : no localized cyanosis [Petechial Hemorrhages (___cm)] : no petechial hemorrhages [Nail Splinter Hemorrhages] : no splinter hemorrhages of the nails [] : no ischemic changes [Fingers Osler's Nodes] : Osler's nodes were not seenon the fingers [Skin Color & Pigmentation] : normal skin color and pigmentation [Oriented To Time, Place, And Person] : oriented to person, place, and time

## 2021-02-15 NOTE — HISTORY OF PRESENT ILLNESS
[FreeTextEntry1] : Current every day smoker, finishes one pack every other day since the age of 14.\par \par Family hx only includes DM in his father Denies an CAD. \par \par Exercises in the form of weight resistance and cardio.  At the age of 22 he max'd out at 300 lbs. has been as low as 190 and gained 30 pounds in the last 6 months

## 2021-03-10 ENCOUNTER — RX RENEWAL (OUTPATIENT)
Age: 36
End: 2021-03-10

## 2021-03-15 ENCOUNTER — APPOINTMENT (OUTPATIENT)
Dept: CARDIOLOGY | Facility: CLINIC | Age: 36
End: 2021-03-15
Payer: MEDICAID

## 2021-03-15 PROCEDURE — 93306 TTE W/DOPPLER COMPLETE: CPT

## 2021-03-15 PROCEDURE — 99072 ADDL SUPL MATRL&STAF TM PHE: CPT

## 2021-04-28 ENCOUNTER — APPOINTMENT (OUTPATIENT)
Dept: INTERNAL MEDICINE | Facility: CLINIC | Age: 36
End: 2021-04-28

## 2021-09-09 ENCOUNTER — EMERGENCY (EMERGENCY)
Facility: HOSPITAL | Age: 36
LOS: 1 days | Discharge: HOME | End: 2021-09-09
Attending: EMERGENCY MEDICINE
Payer: MEDICAID

## 2021-09-09 VITALS
RESPIRATION RATE: 18 BRPM | TEMPERATURE: 99 F | DIASTOLIC BLOOD PRESSURE: 100 MMHG | HEART RATE: 100 BPM | WEIGHT: 183.87 LBS | SYSTOLIC BLOOD PRESSURE: 162 MMHG | HEIGHT: 73 IN | OXYGEN SATURATION: 100 %

## 2021-09-09 PROCEDURE — 73590 X-RAY EXAM OF LOWER LEG: CPT | Mod: 26,RT

## 2021-09-09 PROCEDURE — 73610 X-RAY EXAM OF ANKLE: CPT

## 2021-09-09 PROCEDURE — 99283 EMERGENCY DEPT VISIT LOW MDM: CPT

## 2021-09-09 PROCEDURE — 73590 X-RAY EXAM OF LOWER LEG: CPT

## 2021-09-09 PROCEDURE — 73610 X-RAY EXAM OF ANKLE: CPT | Mod: 26,RT

## 2021-09-09 PROCEDURE — 99284 EMERGENCY DEPT VISIT MOD MDM: CPT | Mod: 25

## 2021-09-09 PROCEDURE — 96372 THER/PROPH/DIAG INJ SC/IM: CPT

## 2021-09-09 RX ORDER — IBUPROFEN 200 MG
1 TABLET ORAL
Qty: 30 | Refills: 0
Start: 2021-09-09

## 2021-09-09 RX ORDER — KETOROLAC TROMETHAMINE 30 MG/ML
60 SYRINGE (ML) INJECTION ONCE
Refills: 0 | Status: DISCONTINUED | OUTPATIENT
Start: 2021-09-09 | End: 2021-09-09

## 2021-09-09 RX ADMIN — Medication 60 MILLIGRAM(S): at 11:03

## 2021-09-09 NOTE — ED PROVIDER NOTE - CARE PROVIDER_API CALL
Seamus Hall)  Orthopaedic Surgery  [address] [place] [address]  Phone: [phone]  Fax: [phone]  Follow Up Time:    Seamus Hall)  Orthopaedic Surgery  [address] [place] [address]  Phone: [phone]  Fax: [phone]  Follow Up Time:     Clayton Dillard)  Podiatric Medicine and Surgery  [address] [place] [address]  Phone: [phone]  Fax: [phone]  Follow Up Time:

## 2021-09-09 NOTE — ED PROVIDER NOTE - NSFOLLOWUPINSTRUCTIONS_ED_ALL_ED_FT
Please take tylenol or motrin as needed    Rest    Ice    Elevate    Follow up with primary care MD or orthopedic if symptoms persist    Return if symptoms worsen

## 2021-09-09 NOTE — ED PROVIDER NOTE - PHYSICAL EXAMINATION
Constitutional - well-developed; well nourished. Head - NCAT. Airway patent. Neuro - A&Ox3. strength 5/5 x4. sensation intact x4. normal gait. Skin - No rash. LE: no deformity, tenderness to right lateral gastroc muscle with soft compartments, DP pulse 2+, reports diminished sensation over the dorsum of the right foot from great toe to lateral aspect of foot.  No foot drop with ambulation.

## 2021-09-09 NOTE — ED PROVIDER NOTE - CARE PROVIDERS DIRECT ADDRESSES
[email or web address]

## 2021-09-09 NOTE — ED PROVIDER NOTE - PATIENT PORTAL LINK FT
You can access the FollowMyHealth Patient Portal offered by [facility] by registering at the following website: [email or web address]. By joining [facility]’s FollowMyHealth portal, you will also be able to view your health information using other applications (apps) compatible with our system.

## 2021-09-09 NOTE — ED PROVIDER NOTE - NS ED ROS FT
No fever/chills, No photophobia/eye pain/changes in vision, No ear pain/sore throat/dysphagia, No chest pain/palpitations, no SOB/cough/wheeze/stridor, No abdominal pain, No N/V/D, no dysuria/frequency/discharge, +R leg pain, No neck/back pain, no rash, no changes in neurological status/function.

## 2021-09-09 NOTE — ED PROVIDER NOTE - PROVIDER TOKENS
PROVIDER:[TOKEN:[[id number]]] PROVIDER:[TOKEN:[[id number]]],PROVIDER:[TOKEN:[[id number]]]

## 2021-09-09 NOTE — ED PROVIDER NOTE - ATTENDING CONTRIBUTION TO CARE
injury to right leg on monday: sitting on barstool, fell over and leg caught in the foot rest.  Able to stand and bear weight immediately after episode then developed pain followed by numbness of the lateral aspect of the right leg and dorsum of the right foot. Reports having difficulty plantar and dorsiflexing due to pain.  Reports dragging of foot yesterday; better today.  PE:  no deformity, tenderness to right lateral gastroc muscle with soft compartments, DP pulse 2+, reports diminished sensation over the dorsum of the right foot from great toe to lateral aspect of foot.  No foot drop with ambulation.  XRAY:  no fracture.  A/P possible neuropraxia:  nsaids and podiatry follow-up.

## 2021-09-09 NOTE — ED PROVIDER NOTE - OBJECTIVE STATEMENT
This is a 35 year old male with c/o R lower leg pain x 2 days.  He notes fell backwards off bar stool and pain to R calf afterwards.  He reports was able to bear weight and stand after injury.  He endorses paresthesias to foot and ankle and pain with passive flexion and extension of ankle.  He reports took 400mg of IBU last night.  He is due to have second dose of pfzier vaccine next week.

## 2021-10-27 ENCOUNTER — EMERGENCY (EMERGENCY)
Facility: HOSPITAL | Age: 36
LOS: 1 days | Discharge: HOME | End: 2021-10-27
Attending: EMERGENCY MEDICINE
Payer: MEDICAID

## 2021-10-27 VITALS
SYSTOLIC BLOOD PRESSURE: 151 MMHG | RESPIRATION RATE: 18 BRPM | DIASTOLIC BLOOD PRESSURE: 85 MMHG | HEIGHT: 73 IN | HEART RATE: 93 BPM | WEIGHT: 177.91 LBS | OXYGEN SATURATION: 100 % | TEMPERATURE: 98 F

## 2021-10-27 PROCEDURE — 99285 EMERGENCY DEPT VISIT HI MDM: CPT

## 2021-10-27 PROCEDURE — 93010 ELECTROCARDIOGRAM REPORT: CPT

## 2021-10-28 VITALS — DIASTOLIC BLOOD PRESSURE: 91 MMHG | HEART RATE: 68 BPM | SYSTOLIC BLOOD PRESSURE: 140 MMHG

## 2021-10-28 DIAGNOSIS — Z90.3 ACQUIRED ABSENCE OF STOMACH [PART OF]: Chronic | ICD-10-CM

## 2021-10-28 LAB
ALBUMIN SERPL ELPH-MCNC: 4.5 G/DL — SIGNIFICANT CHANGE UP (ref 3.3–5.2)
ALP SERPL-CCNC: 72 U/L — SIGNIFICANT CHANGE UP (ref 40–120)
ALT FLD-CCNC: 33 U/L — SIGNIFICANT CHANGE UP
ANION GAP SERPL CALC-SCNC: 11 MMOL/L — SIGNIFICANT CHANGE UP (ref 5–17)
APTT BLD: 36.5 SEC — HIGH (ref 27.5–35.5)
AST SERPL-CCNC: 26 U/L — SIGNIFICANT CHANGE UP
BASOPHILS # BLD AUTO: 0.04 K/UL — SIGNIFICANT CHANGE UP (ref 0–0.2)
BASOPHILS NFR BLD AUTO: 0.4 % — SIGNIFICANT CHANGE UP (ref 0–2)
BILIRUB SERPL-MCNC: 0.3 MG/DL — LOW (ref 0.4–2)
BUN SERPL-MCNC: 26.3 MG/DL — HIGH (ref 8–20)
CALCIUM SERPL-MCNC: 9.6 MG/DL — SIGNIFICANT CHANGE UP (ref 8.6–10.2)
CHLORIDE SERPL-SCNC: 103 MMOL/L — SIGNIFICANT CHANGE UP (ref 98–107)
CK SERPL-CCNC: 130 U/L — SIGNIFICANT CHANGE UP (ref 30–200)
CO2 SERPL-SCNC: 23 MMOL/L — SIGNIFICANT CHANGE UP (ref 22–29)
CREAT SERPL-MCNC: 0.99 MG/DL — SIGNIFICANT CHANGE UP (ref 0.5–1.3)
EOSINOPHIL # BLD AUTO: 0.11 K/UL — SIGNIFICANT CHANGE UP (ref 0–0.5)
EOSINOPHIL NFR BLD AUTO: 1.1 % — SIGNIFICANT CHANGE UP (ref 0–6)
GLUCOSE SERPL-MCNC: 85 MG/DL — SIGNIFICANT CHANGE UP (ref 70–99)
HCT VFR BLD CALC: 42.4 % — SIGNIFICANT CHANGE UP (ref 39–50)
HGB BLD-MCNC: 14.2 G/DL — SIGNIFICANT CHANGE UP (ref 13–17)
HIV 1 & 2 AB SERPL IA.RAPID: SIGNIFICANT CHANGE UP
IMM GRANULOCYTES NFR BLD AUTO: 0.2 % — SIGNIFICANT CHANGE UP (ref 0–1.5)
INR BLD: 1.05 RATIO — SIGNIFICANT CHANGE UP (ref 0.88–1.16)
LYMPHOCYTES # BLD AUTO: 3.78 K/UL — HIGH (ref 1–3.3)
LYMPHOCYTES # BLD AUTO: 38.6 % — SIGNIFICANT CHANGE UP (ref 13–44)
MCHC RBC-ENTMCNC: 31.6 PG — SIGNIFICANT CHANGE UP (ref 27–34)
MCHC RBC-ENTMCNC: 33.5 GM/DL — SIGNIFICANT CHANGE UP (ref 32–36)
MCV RBC AUTO: 94.2 FL — SIGNIFICANT CHANGE UP (ref 80–100)
MONOCYTES # BLD AUTO: 0.59 K/UL — SIGNIFICANT CHANGE UP (ref 0–0.9)
MONOCYTES NFR BLD AUTO: 6 % — SIGNIFICANT CHANGE UP (ref 2–14)
NEUTROPHILS # BLD AUTO: 5.25 K/UL — SIGNIFICANT CHANGE UP (ref 1.8–7.4)
NEUTROPHILS NFR BLD AUTO: 53.7 % — SIGNIFICANT CHANGE UP (ref 43–77)
PLATELET # BLD AUTO: 249 K/UL — SIGNIFICANT CHANGE UP (ref 150–400)
POTASSIUM SERPL-MCNC: 4.1 MMOL/L — SIGNIFICANT CHANGE UP (ref 3.5–5.3)
POTASSIUM SERPL-SCNC: 4.1 MMOL/L — SIGNIFICANT CHANGE UP (ref 3.5–5.3)
PROT SERPL-MCNC: 7.3 G/DL — SIGNIFICANT CHANGE UP (ref 6.6–8.7)
PROTHROM AB SERPL-ACNC: 12.2 SEC — SIGNIFICANT CHANGE UP (ref 10.6–13.6)
RBC # BLD: 4.5 M/UL — SIGNIFICANT CHANGE UP (ref 4.2–5.8)
RBC # FLD: 12.4 % — SIGNIFICANT CHANGE UP (ref 10.3–14.5)
SODIUM SERPL-SCNC: 137 MMOL/L — SIGNIFICANT CHANGE UP (ref 135–145)
TROPONIN T SERPL-MCNC: <0.01 NG/ML — SIGNIFICANT CHANGE UP (ref 0–0.06)
WBC # BLD: 9.79 K/UL — SIGNIFICANT CHANGE UP (ref 3.8–10.5)
WBC # FLD AUTO: 9.79 K/UL — SIGNIFICANT CHANGE UP (ref 3.8–10.5)

## 2021-10-28 PROCEDURE — 82550 ASSAY OF CK (CPK): CPT

## 2021-10-28 PROCEDURE — 36415 COLL VENOUS BLD VENIPUNCTURE: CPT

## 2021-10-28 PROCEDURE — 93005 ELECTROCARDIOGRAM TRACING: CPT

## 2021-10-28 PROCEDURE — 70498 CT ANGIOGRAPHY NECK: CPT | Mod: MA

## 2021-10-28 PROCEDURE — 85730 THROMBOPLASTIN TIME PARTIAL: CPT

## 2021-10-28 PROCEDURE — 0042T: CPT

## 2021-10-28 PROCEDURE — 86703 HIV-1/HIV-2 1 RESULT ANTBDY: CPT

## 2021-10-28 PROCEDURE — 80053 COMPREHEN METABOLIC PANEL: CPT

## 2021-10-28 PROCEDURE — 71045 X-RAY EXAM CHEST 1 VIEW: CPT | Mod: 26

## 2021-10-28 PROCEDURE — 70450 CT HEAD/BRAIN W/O DYE: CPT | Mod: MA

## 2021-10-28 PROCEDURE — 71045 X-RAY EXAM CHEST 1 VIEW: CPT

## 2021-10-28 PROCEDURE — 70496 CT ANGIOGRAPHY HEAD: CPT | Mod: MA

## 2021-10-28 PROCEDURE — 85025 COMPLETE CBC W/AUTO DIFF WBC: CPT

## 2021-10-28 PROCEDURE — 70496 CT ANGIOGRAPHY HEAD: CPT | Mod: 26,MA

## 2021-10-28 PROCEDURE — 85610 PROTHROMBIN TIME: CPT

## 2021-10-28 PROCEDURE — 99284 EMERGENCY DEPT VISIT MOD MDM: CPT | Mod: 25

## 2021-10-28 PROCEDURE — 82962 GLUCOSE BLOOD TEST: CPT

## 2021-10-28 PROCEDURE — 70498 CT ANGIOGRAPHY NECK: CPT | Mod: 26,MA

## 2021-10-28 PROCEDURE — 84484 ASSAY OF TROPONIN QUANT: CPT

## 2021-10-28 RX ORDER — AMLODIPINE BESYLATE 2.5 MG/1
10 TABLET ORAL ONCE
Refills: 0 | Status: DISCONTINUED | OUTPATIENT
Start: 2021-10-28 | End: 2021-10-28

## 2021-10-28 RX ORDER — AMLODIPINE BESYLATE 2.5 MG/1
5 TABLET ORAL ONCE
Refills: 0 | Status: COMPLETED | OUTPATIENT
Start: 2021-10-28 | End: 2021-10-28

## 2021-10-28 RX ORDER — AMLODIPINE BESYLATE 2.5 MG/1
1 TABLET ORAL
Qty: 10 | Refills: 0
Start: 2021-10-28 | End: 2021-11-06

## 2021-10-28 RX ADMIN — AMLODIPINE BESYLATE 5 MILLIGRAM(S): 2.5 TABLET ORAL at 03:50
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
## 2021-10-28 NOTE — ED PROVIDER NOTE - ATTENDING CONTRIBUTION TO CARE
37 yo M with hx of HTN s/p gastric sleeve presents to ED c/o episode where he lost his vision, became weak and felt like his heart was racing while working out on [employer] in [employer] around 1700 last evening.  Episode lasted approx 15 min before resolving.  Pt c/o headache at present with NIHSS of 0.  Pt ran out of his Amlodipine 10 mg 2 days ago.  On exam awake and alert in NAD, speech clear, PERRL, EOMI, throat clear, mm moist, Neck supple, Cor Reg, Lungs clear b/l, Abd soft, NT, Ext FROM, Neuro CN 2-12 intact, MS 5/5 b/l UE/LE's, Code stoke activated, will give pt hiss BP meds, observe and re-eval

## 2021-10-28 NOTE — ED PROVIDER NOTE - PROGRESS NOTE DETAILS
CROW Fox NOTE: Reviewed all results; no acute findings on imaging/labs. Pt denies current headache or chest pain, no weakness or visual changes currently, ambulating normally. Offered observation for MRI, rpt troponin but pt declined, states he will f/u with PMD tomorrow. Pt stable for d/c, reports improvement, VSS, tolerating PO, ambulatory.  Discussion includes results, plan, proper medication use/side effects, and return precautions. Pt advised to f/u with PMD 1-2 days and specialists discussed.  Printed copies of available lab/radiology results contained within discharge packet. Pt verbalized understanding/agreement of plan.

## 2021-10-28 NOTE — ED PROVIDER NOTE - PATIENT PORTAL LINK FT
You can access the FollowMyHealth Patient Portal offered by [facility] by registering at the following website: [email or web address]. By joining [facility]’s FollowMyHealth portal, you will also be able to view your health information using other applications (apps) compatible with our system.

## 2021-10-28 NOTE — ED PROVIDER NOTE - CARE PROVIDER_API CALL
Segundo Lozada; PhD)  Neurology; Vascular Neurology  [address] [facility] [address]  [place] [address]  Phone: [phone]  Fax: [phone]  Follow Up Time: 4-6 Days

## 2021-10-28 NOTE — ED PROVIDER NOTE - PHYSICAL EXAMINATION
Vital signs noted, see flowsheet.  General: NAD, well appearing and non-toxic.  HEENT: NC/AT. MMM. No nasal discharge, throat clear.  Conjunctiva and sclera clear b/l.  EOMI. PERRL.  Neck: Soft and supple, full ROM without pain.  Cardiac: RRR. +S1/S2. Peripheral pulses 2+ and symmetric b/l. No LE edema.  Respiratory: Speaking in full sentences, no evidence of respiratory distress. Lungs CTA b/l, no wheezes/rhonchi/rales/stridor.   Abdomen: BSx4. Soft, NTND. No guarding or rebound tenderness. No suprapubic tenderness.  Back: Spine midline and non-tender. No CVAT.  Skin: Normal color for race, no evidence of rash, ecchymosis, cyanosis or jaundice.   Neuro: Awake, alert and oriented to person/place/time/situation. Speech clear, no focal deficits, no facial droop  Follows commands appropriately.  Sensation intact,  strong.  Strength good.  CN II-XII grossly intact. No dysmetria. Ambulatory independently, No drift.  Psych: Normal affect

## 2021-10-28 NOTE — ED PROVIDER NOTE - OBJECTIVE STATEMENT
5pm tonight on stair master  all of a sudden feltt he machine gogni "200 mph"  lost vision and felt like he coundlt get his words out  lasted for 15 mins then resolved  also posterior headache    Code Stroke activated     on amlodipine 10mg hasnt taken in 2 days    took bp at home on wrist and was 80/60s 35 y/o M with PMHx HTN, s/p gastric sleeve presents to ED c/o episode occurring around 5pm tonight while at the gym, pt was exercising on stair master when he experienced vision loss, felt weakness and "couldn't get my words out." Pt states he felt like "the machine was going 200mph." Pt reports this episode lasted about 15 minutes prior to resolving. Pt also reports during the episode he had stabbing diffuse chest discomfort which  is currently still present. Pt reports posterior headache for past 2 hours. Pt usually takes Amlodipine 10mg but last dose was 2 days ago. Pt denies prior episode. Saw cardiologist 1 year ago prior to gastric sleeve surgery. Pt took his blood pressure at home and reports it was 80s/60s on his wrist just after this episode and it slowly got higher. Denies family cardiac or neurologic disease.    Code stroke activated upon my assessment of the patient

## 2021-10-28 NOTE — ED ADULT NURSE REASSESSMENT NOTE - NS ED NURSE REASSESS COMMENT FT1
Pt discharged home. BP Med given. IV removed. Discharge instruction given. Pt verbalized understanding

## 2021-10-28 NOTE — ED ADULT NURSE REASSESSMENT NOTE - NS ED NURSE REASSESS COMMENT FT1
patient A&Ox3 in no acute distress no pain or disc at this time ,  as pe Md hold Norvasc at this time

## 2021-10-28 NOTE — ED PROVIDER NOTE - CLINICAL SUMMARY MEDICAL DECISION MAKING FREE TEXT BOX
35 y/o M with PMHx HTN, s/p gastric sleeve presents to ED c/o episode occurring around 5pm tonight while at the gym, pt was exercising on stair master when he experienced vision loss, felt weakness and "couldn't get my words out." Also with chest discomfort  - Code stroke, ECG, labs, reassess

## 2021-10-28 NOTE — ED PROVIDER NOTE - NSFOLLOWUPINSTRUCTIONS_ED_ALL_ED_FT
- Please follow up with your Primary Care Doctor in 1 - 2 days. If you cannot follow-up with your primary care doctor please return to the Emergency Department for any urgent issues.  - Seek immediate medical care for any new, worsening or concerning signs or symptoms.   - Take medications as directed, be sure to read all instructions on packaging  - You were given copies of all your test results, please bring to your primary care doctor for review of any abnormal test results  - Follow up with your Cardiologist in 2-3 days     - If you have difficulty following up, please call: [phone] or go to [email or web address] to obtain a [facility] doctor or specialist who takes your insurance in your area.    Feel better!    Get help right away if:    •You have chest pain.      •You have a heartbeat that is not regular.    •You have any signs of a stroke. "BE FAST" is an easy way to remember the main warning signs:  •B - Balance. Dizziness, sudden trouble walking, or loss of balance.      •E - Eyes. Trouble seeing or a change in how you see.      •F - Face. Sudden weakness or loss of feeling of the face. The face or eyelid may droop on one side.      •A - Arms. Weakness or loss of feeling in an arm. This happens all of a sudden and most often on one side of the body.      •S - Speech. Sudden trouble speaking, slurred speech, or trouble understanding what people say.      •T - Time. Time to call emergency services. Write down what time symptoms started.      •You have other signs of a stroke, such as:  • A sudden, very bad headache with no known cause.       •Feeling like you may vomit.      •Vomiting.      •A seizure.      These symptoms may be an emergency. Get help right away. Call your local emergency services (911 in the [place]    • Do not wait to see if the symptoms will go away.       • Do not drive yourself to the hospital.

## 2021-11-08 ENCOUNTER — APPOINTMENT (OUTPATIENT)
Dept: CARDIOLOGY | Facility: CLINIC | Age: 36
End: 2021-11-08
Payer: MEDICAID

## 2021-11-08 ENCOUNTER — NON-APPOINTMENT (OUTPATIENT)
Age: 36
End: 2021-11-08

## 2021-11-08 VITALS
HEART RATE: 70 BPM | SYSTOLIC BLOOD PRESSURE: 128 MMHG | HEIGHT: 73 IN | WEIGHT: 178 LBS | BODY MASS INDEX: 23.59 KG/M2 | DIASTOLIC BLOOD PRESSURE: 90 MMHG | RESPIRATION RATE: 16 BRPM

## 2021-11-08 DIAGNOSIS — Z87.19 PERSONAL HISTORY OF OTHER DISEASES OF THE DIGESTIVE SYSTEM: ICD-10-CM

## 2021-11-08 PROCEDURE — 99214 OFFICE O/P EST MOD 30 MIN: CPT

## 2021-11-08 PROCEDURE — 93000 ELECTROCARDIOGRAM COMPLETE: CPT

## 2021-11-08 RX ORDER — LOSARTAN POTASSIUM 25 MG/1
25 TABLET, FILM COATED ORAL
Qty: 90 | Refills: 0 | Status: DISCONTINUED | COMMUNITY
Start: 2021-03-10 | End: 2021-11-08

## 2021-11-08 RX ORDER — LOSARTAN POTASSIUM 50 MG/1
50 TABLET, FILM COATED ORAL DAILY
Qty: 90 | Refills: 0 | Status: DISCONTINUED | COMMUNITY
Start: 2021-01-13 | End: 2021-11-08

## 2021-11-08 NOTE — PHYSICAL EXAM
[Well Developed] : well developed [Well Nourished] : well nourished [No Acute Distress] : no acute distress [Normal Conjunctiva] : normal conjunctiva [Normal Venous Pressure] : normal venous pressure [No Carotid Bruit] : no carotid bruit [Normal S1, S2] : normal S1, S2 [No Murmur] : no murmur [No Rub] : no rub [No Gallop] : no gallop [Good Air Entry] : good air entry [Clear Lung Fields] : clear lung fields [No Respiratory Distress] : no respiratory distress  [Soft] : abdomen soft [Non Tender] : non-tender [No Masses/organomegaly] : no masses/organomegaly [Normal Gait] : normal gait [No Edema] : no edema [No Cyanosis] : no cyanosis [No Clubbing] : no clubbing [No Rash] : no rash [No Skin Lesions] : no skin lesions [Moves all extremities] : moves all extremities [No Focal Deficits] : no focal deficits [Normal Speech] : normal speech [Alert and Oriented] : alert and oriented [Normal memory] : normal memory

## 2021-11-09 NOTE — HISTORY OF PRESENT ILLNESS
[FreeTextEntry1] : During his hospital course, stroke protocol was activated which included CT Brain, CTA Brain and CTA Neck all of which were unremarkable. There was normal perfusion noted with no flow limiting stenosis or occlusion throughout;\par \par EKG showed no ST-T wave changes indicating ischemia and Troponin was negative;\par \par Blood work including electrolytes were within normal limits, but there was some elevation in BUN noted and patient had admitted to being dehydrated not drinking enough fluid especially on "workout days";\par \par Family hx only includes DM in his father Denies any CAD;\par \par Transthoracic Echocardiogram completed on March 15th 2021 which demonstrated normal LV regional wall motion with normal LV systolic function; LVEF of 60%. The left and right atria normal in size and structure. There was no significant valvulopathy noted. There was no evidence of pericardial effusion;\par \par \par

## 2021-11-09 NOTE — REASON FOR VISIT
[FreeTextEntry1] : Patient is a 36-year-old male with history of abnormal EKG, hypertension and prior history of obesity (recent successful gastric sleeve procedure earlier this year- lost 51 lbs since gastric sleeve to now);\par \par He presents today for general cardiac checkup s/p hospital discharge from [facility] on October 28th 2021. Patient had gone to ER after experiencing vision loss, weakness and "couldn't get my words out" while exercising on stair master at his local gym.  He also experienced stabbing diffuse chest discomfort and headaches as well;\par \par Today, he reports feeling much better and all previous symptoms seemed to have spontaneously resolved. Since discharge, he has been exercising at the gym 6 to 7 days per week which includes vigorous stair master workouts for 30-35 minutes without complication;\par \par He denies CP, SOB, CANO, palpitations, presyncope, syncope, edema, diaphoresis.

## 2021-11-09 NOTE — ASSESSMENT
[FreeTextEntry1] : ECG- SR at 70 bpm, anterolateral ST elevation, may be normal repolarization variant. \par \par 10/28/21\par Creat. 0.99\par BUN 26.3\par K 4.1\par Na 137\par HGB 14.2\par Trop <0.01\par \par 1/18/21\par Chol. 179\par HDL 55\par LDL 96\par Tri. 145\par Creat. 0.99\par A1C 5.1\par HGB 17.9\par \par \par \par \par Impression:\par 36 year old male with longstanding history of elevated blood pressure and smoking, presenting s/p hospital discharge for episode of vision loss, weakness, garbled speech, headache and chest discomfort.  Denies personal hx of HLD, DM, palps, edema or orthopnea. Currently an every day smoker approx. 1 pack QOD for the last 21 years. \par \par 1. Prior symptoms that prompted ER visit at [facility] completely resolved spontaneously and patient has been exercising vigorously on daily basis since discharge without complication. Most unlikely to be cardiac or pulmonary in nature\par \par Hospital workup for Stroke was negative and EKG was stable with no signs of ischemia and Troponin was negative. Labs did demonstrate some degree of dehydration which was discussed as possible cause of his symptoms.\par \par 2. Atypical chest pain sensation which last seconds and does not worsen with exertion\par \par Recent transthoracic echocardiogram was unremarkable with normal LV function, no signs of cardiac remodelling and no significant valvulopathy noted.\par \par 3. HTN now being effectively treated. Reportedly had high blood pressure for 19 years during which he elected not to be treated.\par \par 4. ECG today SR and WNL\par \par 5. Lipids controlled. BMI 23 with 51 lbs weight loss since gastric sleeve procedure earlier this year. Denies any exertional discomfort when exercising\par \par 6. family hx negative for CAD that he is aware of. \par \par \par Plan\par 1. Reassurance given.\par \par 2. BW through PCP\par \par 3. Diet and exercise. Must avoid any hidden NA containing foods. Keep well PO hydrated, especially on days he exercises.\par \par 4. With further weight loss might be able to wean down the antihypertensive regimen in the near future\par Clinical follow up in 6 months or PRN.

## 2021-12-30 ENCOUNTER — APPOINTMENT (OUTPATIENT)
Dept: INTERNAL MEDICINE | Facility: CLINIC | Age: 36
End: 2021-12-30

## 2022-01-26 ENCOUNTER — APPOINTMENT (OUTPATIENT)
Dept: INTERNAL MEDICINE | Facility: CLINIC | Age: 37
End: 2022-01-26

## 2022-02-14 ENCOUNTER — APPOINTMENT (OUTPATIENT)
Dept: NEUROLOGY | Facility: CLINIC | Age: 37
End: 2022-02-14

## 2022-04-25 ENCOUNTER — APPOINTMENT (OUTPATIENT)
Dept: CARDIOLOGY | Facility: CLINIC | Age: 37
End: 2022-04-25

## 2022-04-26 ENCOUNTER — NON-APPOINTMENT (OUTPATIENT)
Age: 37
End: 2022-04-26

## 2022-09-18 NOTE — ED ADULT NURSE NOTE - AS SC BRADEN M[employer]ILITY
pt. with wound vac for the right foot s/p toe amputation c/o vac not draining since this afternoon, machine presenting with a "blockage" message
(4) no limitation

## 2023-02-14 NOTE — REASON FOR VISIT
[New Patient Visit] : a new patient visit Detail Level: Detailed Quality 431: Preventive Care And Screening: Unhealthy Alcohol Use - Screening: Patient screened for unhealthy alcohol use using a single question and scores less than 2 times per year Quality 110: Preventive Care And Screening: Influenza Immunization: Influenza Immunization Administered during Influenza season

## 2023-02-16 ENCOUNTER — APPOINTMENT (OUTPATIENT)
Dept: NEUROSURGERY | Facility: CLINIC | Age: 38
End: 2023-02-16

## 2023-02-16 NOTE — ASSESSMENT
[FreeTextEntry1] : 37 year old male  with longstanding history of HTN and smoking, prior history of obesity s/p gastric sleeve procedure, history of abnormal EKG in the past with negative troponin,  presenting for neurosurgical evaluation of sciatica. \par \par \par MRI lumbar spine on 10/7/22 (at [facility]) showing multilevel degenerative disc disease. L4/5 disc bulge with bilateral neuroforaminal stenosis and spinal canal stenosis, bilateral facet joint arthropathy. L5/S1 disc bulge with bilateral neuroforaminal stenosis, bilateral facet arthropathy, no spinal canal stenosis. \par \par \par \par ****INCOMPLETE**** 2/14

## 2023-02-16 NOTE — HISTORY OF PRESENT ILLNESS
[de-identified] : 37 year old male  with longstanding history of HTN and smoking, prior history of obesity s/p gastric sleeve procedure, history of abnormal EKG in the past with negative troponin,  presenting for neurosurgical evaluation of sciatica. \par \par \par MRI lumbar spine on 10/7/22 (at [facility]) showing multilevel degenerative disc disease. L4/5 disc bulge with bilateral neuroforaminal stenosis and spinal canal stenosis, bilateral facet joint arthropathy. L5/S1 disc bulge with bilateral neuroforaminal stenosis, bilateral facet arthropathy, no spinal canal stenosis. \par \par \par \par \par \par \par \par \par

## 2023-03-28 ENCOUNTER — APPOINTMENT (OUTPATIENT)
Dept: INTERNAL MEDICINE | Facility: CLINIC | Age: 38
End: 2023-03-28

## 2023-06-26 NOTE — ED ADULT NURSE NOTE - NSIMPLEMENTINTERV_GEN_ALL_ED
Implemented All Universal Safety Interventions:  [place] to call system. Call bell, personal items and telephone within reach. Instruct patient to call for assistance. Room bathroom lighting operational. Non-slip footwear when patient is off stretcher. Physically safe environment: no spills, clutter or unnecessary equipment. Stretcher in lowest position, wheels locked, appropriate side rails in place. Split-Thickness Skin Graft Text: The defect edges were debeveled with a #15 scalpel blade. Given the location of the defect, shape of the defect and the proximity to free margins a split thickness skin graft was deemed most appropriate. Using a sterile surgical marker, the primary defect shape was transferred to the donor site. The split thickness graft was then harvested.  The skin graft was then placed in the primary defect and oriented appropriately.

## 2024-04-21 NOTE — ED ADULT NURSE NOTE - BREATH SOUNDS, MLM
Patient arrived via EMS from home for diarrhea, vomiting, fever, dizziness, and cp that started today. Patient states chest pain is shooting and in the center of chest. Patient recently dx with sinus infection and took last antibiotic yesterday. EMS gave 324mg aspirin and 4 mg Zofran in route.    Clear

## 2024-05-29 ENCOUNTER — INPATIENT (INPATIENT)
Facility: HOSPITAL | Age: 39
LOS: 12 days | Discharge: HOME | DRG: 443 | End: 2024-06-11
Attending: HOSPITALIST | Admitting: SURGERY
Payer: MEDICAID

## 2024-05-29 ENCOUNTER — EMERGENCY (EMERGENCY)
Facility: HOSPITAL | Age: 39
LOS: 1 days | Discharge: OTHER INSTITUTION | End: 2024-05-29
Attending: EMERGENCY MEDICINE
Payer: MEDICAID

## 2024-05-29 VITALS
HEART RATE: 104 BPM | RESPIRATION RATE: 26 BRPM | DIASTOLIC BLOOD PRESSURE: 87 MMHG | SYSTOLIC BLOOD PRESSURE: 153 MMHG | OXYGEN SATURATION: 100 %

## 2024-05-29 VITALS
SYSTOLIC BLOOD PRESSURE: 135 MMHG | HEART RATE: 118 BPM | WEIGHT: 179.9 LBS | TEMPERATURE: 98 F | DIASTOLIC BLOOD PRESSURE: 82 MMHG | RESPIRATION RATE: 20 BRPM | OXYGEN SATURATION: 100 % | HEIGHT: 73 IN

## 2024-05-29 VITALS — HEIGHT: 73 IN

## 2024-05-29 DIAGNOSIS — Z90.3 ACQUIRED ABSENCE OF STOMACH [PART OF]: Chronic | ICD-10-CM

## 2024-05-29 DIAGNOSIS — K72.00 ACUTE AND SUBACUTE HEPATIC FAILURE WITHOUT COMA: ICD-10-CM

## 2024-05-29 LAB
ALBUMIN SERPL ELPH-MCNC: 3.1 G/DL — LOW (ref 3.3–5)
ALBUMIN SERPL ELPH-MCNC: 3.1 G/DL — LOW (ref 3.3–5)
ALBUMIN SERPL ELPH-MCNC: 3.6 G/DL — SIGNIFICANT CHANGE UP (ref 3.3–5.2)
ALP SERPL-CCNC: 264 U/L — HIGH (ref 40–120)
ALP SERPL-CCNC: 297 U/L — HIGH (ref 40–120)
ALP SERPL-CCNC: 337 U/L — HIGH (ref 40–120)
ALT FLD-CCNC: 2982 U/L — HIGH (ref 10–45)
ALT FLD-CCNC: 3746 U/L — HIGH (ref 10–45)
ALT FLD-CCNC: 4619 U/L — HIGH
AMMONIA BLD-MCNC: 104 UMOL/L — HIGH (ref 11–55)
AMMONIA BLD-MCNC: 115 UMOL/L — HIGH (ref 11–55)
AMMONIA BLD-MCNC: 125 UMOL/L — HIGH (ref 11–55)
AMMONIA BLD-MCNC: 99 UMOL/L — HIGH (ref 11–55)
AMORPH CRY # UR COMP ASSIST: PRESENT
AMPHET UR-MCNC: NEGATIVE — SIGNIFICANT CHANGE UP
ANION GAP SERPL CALC-SCNC: 12 MMOL/L — SIGNIFICANT CHANGE UP (ref 5–17)
ANION GAP SERPL CALC-SCNC: 13 MMOL/L — SIGNIFICANT CHANGE UP (ref 5–17)
ANION GAP SERPL CALC-SCNC: 19 MMOL/L — HIGH (ref 5–17)
APAP SERPL-MCNC: 8.5 UG/ML — LOW (ref 10–26)
APPEARANCE UR: ABNORMAL
APTT BLD: 37.9 SEC — HIGH (ref 24.5–35.6)
APTT BLD: 39 SEC — HIGH (ref 24.5–35.6)
AST SERPL-CCNC: 1806 U/L — HIGH (ref 10–40)
AST SERPL-CCNC: 3428 U/L — HIGH (ref 10–40)
AST SERPL-CCNC: 4845 U/L — HIGH
BACTERIA # UR AUTO: ABNORMAL /HPF
BARBITURATES UR SCN-MCNC: NEGATIVE — SIGNIFICANT CHANGE UP
BASE EXCESS BLDV CALC-SCNC: 2 MMOL/L — SIGNIFICANT CHANGE UP (ref -2–3)
BASE EXCESS BLDV CALC-SCNC: 3.4 MMOL/L — HIGH (ref -2–3)
BASOPHILS # BLD AUTO: 0 K/UL — SIGNIFICANT CHANGE UP (ref 0–0.2)
BASOPHILS # BLD AUTO: 0.06 K/UL — SIGNIFICANT CHANGE UP (ref 0–0.2)
BASOPHILS NFR BLD AUTO: 0 % — SIGNIFICANT CHANGE UP (ref 0–2)
BASOPHILS NFR BLD AUTO: 0.7 % — SIGNIFICANT CHANGE UP (ref 0–2)
BENZODIAZ UR-MCNC: NEGATIVE — SIGNIFICANT CHANGE UP
BILIRUB SERPL-MCNC: 10 MG/DL — HIGH (ref 0.2–1.2)
BILIRUB SERPL-MCNC: 10.6 MG/DL — HIGH (ref 0.4–2)
BILIRUB SERPL-MCNC: 9.8 MG/DL — HIGH (ref 0.2–1.2)
BILIRUB UR-MCNC: ABNORMAL
BLD GP AB SCN SERPL QL: NEGATIVE — SIGNIFICANT CHANGE UP
BUN SERPL-MCNC: 24 MG/DL — HIGH (ref 7–23)
BUN SERPL-MCNC: 35 MG/DL — HIGH (ref 7–23)
BUN SERPL-MCNC: 41.5 MG/DL — HIGH (ref 8–20)
CA-I SERPL-SCNC: 1.11 MMOL/L — LOW (ref 1.15–1.33)
CA-I SERPL-SCNC: 1.14 MMOL/L — LOW (ref 1.15–1.33)
CALCIUM SERPL-MCNC: 8.3 MG/DL — LOW (ref 8.4–10.5)
CALCIUM SERPL-MCNC: 8.5 MG/DL — SIGNIFICANT CHANGE UP (ref 8.4–10.5)
CALCIUM SERPL-MCNC: 9.3 MG/DL — SIGNIFICANT CHANGE UP (ref 8.4–10.5)
CAST: 11 /LPF — HIGH (ref 0–4)
CHLORIDE BLDV-SCNC: 100 MMOL/L — SIGNIFICANT CHANGE UP (ref 96–108)
CHLORIDE BLDV-SCNC: 97 MMOL/L — SIGNIFICANT CHANGE UP (ref 96–108)
CHLORIDE SERPL-SCNC: 101 MMOL/L — SIGNIFICANT CHANGE UP (ref 96–108)
CHLORIDE SERPL-SCNC: 86 MMOL/L — LOW (ref 96–108)
CHLORIDE SERPL-SCNC: 97 MMOL/L — SIGNIFICANT CHANGE UP (ref 96–108)
CK MB CFR SERPL CALC: 11.8 NG/ML — HIGH (ref 0–6.7)
CK SERPL-CCNC: 314 U/L — HIGH (ref 30–200)
CO2 BLDV-SCNC: 28 MMOL/L — HIGH (ref 22–26)
CO2 BLDV-SCNC: 29 MMOL/L — HIGH (ref 22–26)
CO2 SERPL-SCNC: 22 MMOL/L — SIGNIFICANT CHANGE UP (ref 22–31)
CO2 SERPL-SCNC: 23 MMOL/L — SIGNIFICANT CHANGE UP (ref 22–31)
CO2 SERPL-SCNC: 24 MMOL/L — SIGNIFICANT CHANGE UP (ref 22–29)
COARSE GRAN CASTS #/AREA URNS AUTO: PRESENT
COCAINE METAB.OTHER UR-MCNC: NEGATIVE — SIGNIFICANT CHANGE UP
COLOR SPEC: SIGNIFICANT CHANGE UP
CREAT SERPL-MCNC: 0.87 MG/DL — SIGNIFICANT CHANGE UP (ref 0.5–1.3)
CREAT SERPL-MCNC: 1.4 MG/DL — HIGH (ref 0.5–1.3)
CREAT SERPL-MCNC: 1.72 MG/DL — HIGH (ref 0.5–1.3)
CRP SERPL-MCNC: 55 MG/L — HIGH
DACRYOCYTES BLD QL SMEAR: SLIGHT — SIGNIFICANT CHANGE UP
DIFF PNL FLD: NEGATIVE — SIGNIFICANT CHANGE UP
EGFR: 114 ML/MIN/1.73M2 — SIGNIFICANT CHANGE UP
EGFR: 52 ML/MIN/1.73M2 — LOW
EGFR: 66 ML/MIN/1.73M2 — SIGNIFICANT CHANGE UP
EOSINOPHIL # BLD AUTO: 0.02 K/UL — SIGNIFICANT CHANGE UP (ref 0–0.5)
EOSINOPHIL # BLD AUTO: 0.08 K/UL — SIGNIFICANT CHANGE UP (ref 0–0.5)
EOSINOPHIL NFR BLD AUTO: 0.2 % — SIGNIFICANT CHANGE UP (ref 0–6)
EOSINOPHIL NFR BLD AUTO: 0.9 % — SIGNIFICANT CHANGE UP (ref 0–6)
ETHANOL SERPL-MCNC: <10 MG/DL — SIGNIFICANT CHANGE UP (ref 0–10)
ETHANOL SERPL-MCNC: <10 MG/DL — SIGNIFICANT CHANGE UP (ref 0–9)
FENTANYL UR QL SCN: NEGATIVE — SIGNIFICANT CHANGE UP
FIBRINOGEN PPP-MCNC: 167 MG/DL — LOW (ref 200–450)
FINE GRAN CASTS #/AREA URNS AUTO: PRESENT
GAS PNL BLDV: 129 MMOL/L — LOW (ref 136–145)
GAS PNL BLDV: 132 MMOL/L — LOW (ref 136–145)
GAS PNL BLDV: SIGNIFICANT CHANGE UP
GLUCOSE BLDV-MCNC: 106 MG/DL — HIGH (ref 70–99)
GLUCOSE BLDV-MCNC: 133 MG/DL — HIGH (ref 70–99)
GLUCOSE SERPL-MCNC: 111 MG/DL — HIGH (ref 70–99)
GLUCOSE SERPL-MCNC: 119 MG/DL — HIGH (ref 70–99)
GLUCOSE SERPL-MCNC: 140 MG/DL — HIGH (ref 70–99)
GLUCOSE UR QL: NEGATIVE MG/DL — SIGNIFICANT CHANGE UP
HBV SURFACE AG SER-ACNC: SIGNIFICANT CHANGE UP
HCO3 BLDV-SCNC: 27 MMOL/L — SIGNIFICANT CHANGE UP (ref 22–29)
HCO3 BLDV-SCNC: 27 MMOL/L — SIGNIFICANT CHANGE UP (ref 22–29)
HCT VFR BLD CALC: 36.8 % — LOW (ref 39–50)
HCT VFR BLD CALC: 40.5 % — SIGNIFICANT CHANGE UP (ref 39–50)
HCT VFR BLD CALC: 45.2 % — SIGNIFICANT CHANGE UP (ref 39–50)
HCT VFR BLDA CALC: 40 % — SIGNIFICANT CHANGE UP (ref 39–51)
HCT VFR BLDA CALC: 43 % — SIGNIFICANT CHANGE UP (ref 39–51)
HGB BLD CALC-MCNC: 13.4 G/DL — SIGNIFICANT CHANGE UP (ref 12.6–17.4)
HGB BLD CALC-MCNC: 14.3 G/DL — SIGNIFICANT CHANGE UP (ref 12.6–17.4)
HGB BLD-MCNC: 13 G/DL — SIGNIFICANT CHANGE UP (ref 13–17)
HGB BLD-MCNC: 13.7 G/DL — SIGNIFICANT CHANGE UP (ref 13–17)
HGB BLD-MCNC: 15.6 G/DL — SIGNIFICANT CHANGE UP (ref 13–17)
HIV 1+2 AB+HIV1 P24 AG SERPL QL IA: SIGNIFICANT CHANGE UP
HOROWITZ INDEX BLDV+IHG-RTO: 21 — SIGNIFICANT CHANGE UP
IMM GRANULOCYTES NFR BLD AUTO: 1 % — HIGH (ref 0–0.9)
INR BLD: 3.7 RATIO — HIGH (ref 0.85–1.18)
INR BLD: 3.82 RATIO — HIGH (ref 0.85–1.18)
KETONES UR-MCNC: ABNORMAL MG/DL
LACTATE BLDV-MCNC: 1.6 MMOL/L — SIGNIFICANT CHANGE UP (ref 0.5–2)
LACTATE BLDV-MCNC: 3 MMOL/L — HIGH (ref 0.5–2)
LACTATE BLDV-MCNC: 3.4 MMOL/L — HIGH (ref 0.5–2)
LACTATE BLDV-MCNC: 6.6 MMOL/L — CRITICAL HIGH (ref 0.5–2)
LEUKOCYTE ESTERASE UR-ACNC: ABNORMAL
LIDOCAIN IGE QN: 27 U/L — SIGNIFICANT CHANGE UP (ref 22–51)
LYMPHOCYTES # BLD AUTO: 0.55 K/UL — LOW (ref 1–3.3)
LYMPHOCYTES # BLD AUTO: 0.63 K/UL — LOW (ref 1–3.3)
LYMPHOCYTES # BLD AUTO: 6 % — LOW (ref 13–44)
LYMPHOCYTES # BLD AUTO: 6.9 % — LOW (ref 13–44)
MAGNESIUM SERPL-MCNC: 1.8 MG/DL — SIGNIFICANT CHANGE UP (ref 1.6–2.6)
MAGNESIUM SERPL-MCNC: 2 MG/DL — SIGNIFICANT CHANGE UP (ref 1.6–2.6)
MANUAL SMEAR VERIFICATION: SIGNIFICANT CHANGE UP
MCHC RBC-ENTMCNC: 32 PG — SIGNIFICANT CHANGE UP (ref 27–34)
MCHC RBC-ENTMCNC: 32.3 PG — SIGNIFICANT CHANGE UP (ref 27–34)
MCHC RBC-ENTMCNC: 32.7 PG — SIGNIFICANT CHANGE UP (ref 27–34)
MCHC RBC-ENTMCNC: 33.8 GM/DL — SIGNIFICANT CHANGE UP (ref 32–36)
MCHC RBC-ENTMCNC: 34.5 GM/DL — SIGNIFICANT CHANGE UP (ref 32–36)
MCHC RBC-ENTMCNC: 35.3 GM/DL — SIGNIFICANT CHANGE UP (ref 32–36)
MCV RBC AUTO: 92.5 FL — SIGNIFICANT CHANGE UP (ref 80–100)
MCV RBC AUTO: 92.8 FL — SIGNIFICANT CHANGE UP (ref 80–100)
MCV RBC AUTO: 95.5 FL — SIGNIFICANT CHANGE UP (ref 80–100)
METAMYELOCYTES # FLD: 1.7 % — HIGH (ref 0–0)
METHADONE UR-MCNC: NEGATIVE — SIGNIFICANT CHANGE UP
MONOCYTES # BLD AUTO: 0.93 K/UL — HIGH (ref 0–0.9)
MONOCYTES # BLD AUTO: 0.95 K/UL — HIGH (ref 0–0.9)
MONOCYTES NFR BLD AUTO: 10.2 % — SIGNIFICANT CHANGE UP (ref 2–14)
MONOCYTES NFR BLD AUTO: 10.3 % — SIGNIFICANT CHANGE UP (ref 2–14)
MYELOCYTES NFR BLD: 1.7 % — HIGH (ref 0–0)
NEUTROPHILS # BLD AUTO: 7.3 K/UL — SIGNIFICANT CHANGE UP (ref 1.8–7.4)
NEUTROPHILS # BLD AUTO: 7.37 K/UL — SIGNIFICANT CHANGE UP (ref 1.8–7.4)
NEUTROPHILS NFR BLD AUTO: 71.6 % — SIGNIFICANT CHANGE UP (ref 43–77)
NEUTROPHILS NFR BLD AUTO: 81 % — HIGH (ref 43–77)
NEUTS BAND # BLD: 7.8 % — SIGNIFICANT CHANGE UP (ref 0–8)
NITRITE UR-MCNC: POSITIVE
NRBC # BLD: 0 /100 WBCS — SIGNIFICANT CHANGE UP (ref 0–0)
NRBC # BLD: 0 /100 WBCS — SIGNIFICANT CHANGE UP (ref 0–0)
NRBC # BLD: 1 /100 WBCS — HIGH (ref 0–0)
NT-PROBNP SERPL-SCNC: 94 PG/ML — SIGNIFICANT CHANGE UP (ref 0–300)
OPIATES UR-MCNC: NEGATIVE — SIGNIFICANT CHANGE UP
PCO2 BLDV: 36 MMHG — LOW (ref 42–55)
PCO2 BLDV: 44 MMHG — SIGNIFICANT CHANGE UP (ref 42–55)
PCP SPEC-MCNC: SIGNIFICANT CHANGE UP
PCP UR-MCNC: NEGATIVE — SIGNIFICANT CHANGE UP
PH BLDV: 7.4 — SIGNIFICANT CHANGE UP (ref 7.32–7.43)
PH BLDV: 7.48 — HIGH (ref 7.32–7.43)
PH UR: 5.5 — SIGNIFICANT CHANGE UP (ref 5–8)
PHOSPHATE SERPL-MCNC: 0.6 MG/DL — CRITICAL LOW (ref 2.5–4.5)
PLAT MORPH BLD: NORMAL — SIGNIFICANT CHANGE UP
PLATELET # BLD AUTO: 101 K/UL — LOW (ref 150–400)
PLATELET # BLD AUTO: 116 K/UL — LOW (ref 150–400)
PLATELET # BLD AUTO: 98 K/UL — LOW (ref 150–400)
PO2 BLDV: 39 MMHG — SIGNIFICANT CHANGE UP (ref 25–45)
PO2 BLDV: 66 MMHG — HIGH (ref 25–45)
POLYCHROMASIA BLD QL SMEAR: SIGNIFICANT CHANGE UP
POTASSIUM BLDV-SCNC: 3.8 MMOL/L — SIGNIFICANT CHANGE UP (ref 3.5–5.1)
POTASSIUM BLDV-SCNC: 5 MMOL/L — SIGNIFICANT CHANGE UP (ref 3.5–5.1)
POTASSIUM SERPL-MCNC: 3.9 MMOL/L — SIGNIFICANT CHANGE UP (ref 3.5–5.3)
POTASSIUM SERPL-MCNC: 4.6 MMOL/L — SIGNIFICANT CHANGE UP (ref 3.5–5.3)
POTASSIUM SERPL-MCNC: 4.8 MMOL/L — SIGNIFICANT CHANGE UP (ref 3.5–5.3)
POTASSIUM SERPL-SCNC: 3.9 MMOL/L — SIGNIFICANT CHANGE UP (ref 3.5–5.3)
POTASSIUM SERPL-SCNC: 4.6 MMOL/L — SIGNIFICANT CHANGE UP (ref 3.5–5.3)
POTASSIUM SERPL-SCNC: 4.8 MMOL/L — SIGNIFICANT CHANGE UP (ref 3.5–5.3)
PROCALCITONIN SERPL-MCNC: 7.91 NG/ML — HIGH (ref 0.02–0.1)
PROCALCITONIN SERPL-MCNC: 9.32 NG/ML — HIGH (ref 0.02–0.1)
PROT SERPL-MCNC: 5.6 G/DL — LOW (ref 6–8.3)
PROT SERPL-MCNC: 5.9 G/DL — LOW (ref 6–8.3)
PROT SERPL-MCNC: 6.3 G/DL — LOW (ref 6.6–8.7)
PROT UR-MCNC: 30 MG/DL
PROTHROM AB SERPL-ACNC: 39.2 SEC — HIGH (ref 9.5–13)
PROTHROM AB SERPL-ACNC: 40.8 SEC — HIGH (ref 9.5–13)
RBC # BLD: 3.98 M/UL — LOW (ref 4.2–5.8)
RBC # BLD: 4.24 M/UL — SIGNIFICANT CHANGE UP (ref 4.2–5.8)
RBC # BLD: 4.87 M/UL — SIGNIFICANT CHANGE UP (ref 4.2–5.8)
RBC # FLD: 13 % — SIGNIFICANT CHANGE UP (ref 10.3–14.5)
RBC # FLD: 13 % — SIGNIFICANT CHANGE UP (ref 10.3–14.5)
RBC # FLD: 13.2 % — SIGNIFICANT CHANGE UP (ref 10.3–14.5)
RBC BLD AUTO: ABNORMAL
RBC CASTS # UR COMP ASSIST: 9 /HPF — HIGH (ref 0–4)
RH IG SCN BLD-IMP: POSITIVE — SIGNIFICANT CHANGE UP
SALICYLATES SERPL-MCNC: <0.6 MG/DL — LOW (ref 10–20)
SAO2 % BLDV: 61.8 % — LOW (ref 67–88)
SAO2 % BLDV: 94.6 % — HIGH (ref 67–88)
SMUDGE CELLS # BLD: PRESENT — SIGNIFICANT CHANGE UP
SODIUM SERPL-SCNC: 129 MMOL/L — LOW (ref 135–145)
SODIUM SERPL-SCNC: 131 MMOL/L — LOW (ref 135–145)
SODIUM SERPL-SCNC: 137 MMOL/L — SIGNIFICANT CHANGE UP (ref 135–145)
SP GR SPEC: 1.02 — SIGNIFICANT CHANGE UP (ref 1–1.03)
SQUAMOUS # UR AUTO: 34 /HPF — HIGH (ref 0–5)
THC UR QL: NEGATIVE — SIGNIFICANT CHANGE UP
TROPONIN T, HIGH SENSITIVITY RESULT: 34 NG/L — SIGNIFICANT CHANGE UP (ref 0–51)
TROPONIN T, HIGH SENSITIVITY RESULT: 40 NG/L — SIGNIFICANT CHANGE UP (ref 0–51)
UROBILINOGEN FLD QL: 1 MG/DL — SIGNIFICANT CHANGE UP (ref 0.2–1)
WBC # BLD: 8.18 K/UL — SIGNIFICANT CHANGE UP (ref 3.8–10.5)
WBC # BLD: 9.1 K/UL — SIGNIFICANT CHANGE UP (ref 3.8–10.5)
WBC # BLD: 9.19 K/UL — SIGNIFICANT CHANGE UP (ref 3.8–10.5)
WBC # FLD AUTO: 8.18 K/UL — SIGNIFICANT CHANGE UP (ref 3.8–10.5)
WBC # FLD AUTO: 9.1 K/UL — SIGNIFICANT CHANGE UP (ref 3.8–10.5)
WBC # FLD AUTO: 9.19 K/UL — SIGNIFICANT CHANGE UP (ref 3.8–10.5)
WBC UR QL: 1 /HPF — SIGNIFICANT CHANGE UP (ref 0–5)

## 2024-05-29 PROCEDURE — 82550 ASSAY OF CK (CPK): CPT

## 2024-05-29 PROCEDURE — 83735 ASSAY OF MAGNESIUM: CPT

## 2024-05-29 PROCEDURE — 70450 CT HEAD/BRAIN W/O DYE: CPT | Mod: 26,MC

## 2024-05-29 PROCEDURE — 96366 THER/PROPH/DIAG IV INF ADDON: CPT

## 2024-05-29 PROCEDURE — 85384 FIBRINOGEN ACTIVITY: CPT

## 2024-05-29 PROCEDURE — 70450 CT HEAD/BRAIN W/O DYE: CPT | Mod: MC

## 2024-05-29 PROCEDURE — 70491 CT SOFT TISSUE NECK W/DYE: CPT | Mod: MC

## 2024-05-29 PROCEDURE — 82553 CREATINE MB FRACTION: CPT

## 2024-05-29 PROCEDURE — 36415 COLL VENOUS BLD VENIPUNCTURE: CPT

## 2024-05-29 PROCEDURE — 81001 URINALYSIS AUTO W/SCOPE: CPT

## 2024-05-29 PROCEDURE — 96375 TX/PRO/DX INJ NEW DRUG ADDON: CPT | Mod: XU

## 2024-05-29 PROCEDURE — 85730 THROMBOPLASTIN TIME PARTIAL: CPT

## 2024-05-29 PROCEDURE — 93010 ELECTROCARDIOGRAM REPORT: CPT

## 2024-05-29 PROCEDURE — 99285 EMERGENCY DEPT VISIT HI MDM: CPT

## 2024-05-29 PROCEDURE — 74177 CT ABD & PELVIS W/CONTRAST: CPT | Mod: 26,MC

## 2024-05-29 PROCEDURE — 83605 ASSAY OF LACTIC ACID: CPT

## 2024-05-29 PROCEDURE — 83690 ASSAY OF LIPASE: CPT

## 2024-05-29 PROCEDURE — 80307 DRUG TEST PRSMV CHEM ANLYZR: CPT

## 2024-05-29 PROCEDURE — 82140 ASSAY OF AMMONIA: CPT

## 2024-05-29 PROCEDURE — 71045 X-RAY EXAM CHEST 1 VIEW: CPT

## 2024-05-29 PROCEDURE — 71260 CT THORAX DX C+: CPT | Mod: 26,MC

## 2024-05-29 PROCEDURE — 84145 PROCALCITONIN (PCT): CPT

## 2024-05-29 PROCEDURE — 99222 1ST HOSP IP/OBS MODERATE 55: CPT | Mod: GC

## 2024-05-29 PROCEDURE — 71260 CT THORAX DX C+: CPT | Mod: MC

## 2024-05-29 PROCEDURE — 85610 PROTHROMBIN TIME: CPT

## 2024-05-29 PROCEDURE — 85025 COMPLETE CBC W/AUTO DIFF WBC: CPT

## 2024-05-29 PROCEDURE — 84484 ASSAY OF TROPONIN QUANT: CPT

## 2024-05-29 PROCEDURE — 83880 ASSAY OF NATRIURETIC PEPTIDE: CPT

## 2024-05-29 PROCEDURE — 99291 CRITICAL CARE FIRST HOUR: CPT | Mod: GC

## 2024-05-29 PROCEDURE — 71045 X-RAY EXAM CHEST 1 VIEW: CPT | Mod: 26,77

## 2024-05-29 PROCEDURE — 80074 ACUTE HEPATITIS PANEL: CPT

## 2024-05-29 PROCEDURE — 96365 THER/PROPH/DIAG IV INF INIT: CPT | Mod: XU

## 2024-05-29 PROCEDURE — 99285 EMERGENCY DEPT VISIT HI MDM: CPT | Mod: 25

## 2024-05-29 PROCEDURE — 70491 CT SOFT TISSUE NECK W/DYE: CPT | Mod: 26,MC

## 2024-05-29 PROCEDURE — 96376 TX/PRO/DX INJ SAME DRUG ADON: CPT | Mod: XU

## 2024-05-29 PROCEDURE — 99223 1ST HOSP IP/OBS HIGH 75: CPT

## 2024-05-29 PROCEDURE — 71045 X-RAY EXAM CHEST 1 VIEW: CPT | Mod: 26

## 2024-05-29 PROCEDURE — 86140 C-REACTIVE PROTEIN: CPT

## 2024-05-29 PROCEDURE — 93005 ELECTROCARDIOGRAM TRACING: CPT

## 2024-05-29 PROCEDURE — 80053 COMPREHEN METABOLIC PANEL: CPT

## 2024-05-29 PROCEDURE — T1013: CPT

## 2024-05-29 PROCEDURE — 87040 BLOOD CULTURE FOR BACTERIA: CPT

## 2024-05-29 PROCEDURE — 74177 CT ABD & PELVIS W/CONTRAST: CPT | Mod: MC

## 2024-05-29 RX ORDER — ACETYLCYSTEINE 200 MG/ML
150 VIAL (ML) MISCELLANEOUS ONCE
Refills: 0 | Status: DISCONTINUED | OUTPATIENT
Start: 2024-05-29 | End: 2024-05-29

## 2024-05-29 RX ORDER — ACETYLCYSTEINE 200 MG/ML
4 VIAL (ML) MISCELLANEOUS ONCE
Refills: 0 | Status: COMPLETED | OUTPATIENT
Start: 2024-05-29 | End: 2024-05-29

## 2024-05-29 RX ORDER — ACETYLCYSTEINE 200 MG/ML
8 VIAL (ML) MISCELLANEOUS ONCE
Refills: 0 | Status: COMPLETED | OUTPATIENT
Start: 2024-05-29 | End: 2024-05-29

## 2024-05-29 RX ORDER — SODIUM CHLORIDE 9 MG/ML
1000 INJECTION INTRAMUSCULAR; INTRAVENOUS; SUBCUTANEOUS ONCE
Refills: 0 | Status: COMPLETED | OUTPATIENT
Start: 2024-05-29 | End: 2024-05-29

## 2024-05-29 RX ORDER — LACTULOSE 10 G/15ML
10 SOLUTION ORAL EVERY 4 HOURS
Refills: 0 | Status: DISCONTINUED | OUTPATIENT
Start: 2024-05-29 | End: 2024-05-30

## 2024-05-29 RX ORDER — THIAMINE MONONITRATE (VIT B1) 100 MG
100 TABLET ORAL ONCE
Refills: 0 | Status: COMPLETED | OUTPATIENT
Start: 2024-05-29 | End: 2024-05-29

## 2024-05-29 RX ORDER — ACETYLCYSTEINE 200 MG/ML
50 VIAL (ML) MISCELLANEOUS ONCE
Refills: 0 | Status: DISCONTINUED | OUTPATIENT
Start: 2024-05-29 | End: 2024-05-29

## 2024-05-29 RX ORDER — CALCIUM GLUCONATE 100 MG/ML
1 VIAL (ML) INTRAVENOUS ONCE
Refills: 0 | Status: COMPLETED | OUTPATIENT
Start: 2024-05-29 | End: 2024-05-30

## 2024-05-29 RX ORDER — ACETYLCYSTEINE 200 MG/ML
150 VIAL (ML) MISCELLANEOUS EVERY 24 HOURS
Refills: 0 | Status: DISCONTINUED | OUTPATIENT
Start: 2024-05-29 | End: 2024-05-29

## 2024-05-29 RX ORDER — THIAMINE MONONITRATE (VIT B1) 100 MG
500 TABLET ORAL ONCE
Refills: 0 | Status: COMPLETED | OUTPATIENT
Start: 2024-05-29 | End: 2024-05-29

## 2024-05-29 RX ORDER — CEFTRIAXONE 500 MG/1
1000 INJECTION, POWDER, FOR SOLUTION INTRAMUSCULAR; INTRAVENOUS ONCE
Refills: 0 | Status: COMPLETED | OUTPATIENT
Start: 2024-05-29 | End: 2024-05-29

## 2024-05-29 RX ORDER — FAMOTIDINE 10 MG/ML
20 INJECTION INTRAVENOUS ONCE
Refills: 0 | Status: COMPLETED | OUTPATIENT
Start: 2024-05-29 | End: 2024-05-29

## 2024-05-29 RX ORDER — LACTULOSE 10 G/15ML
20 SOLUTION ORAL ONCE
Refills: 0 | Status: DISCONTINUED | OUTPATIENT
Start: 2024-05-29 | End: 2024-05-29

## 2024-05-29 RX ORDER — CEFTRIAXONE 500 MG/1
2000 INJECTION, POWDER, FOR SOLUTION INTRAMUSCULAR; INTRAVENOUS EVERY 24 HOURS
Refills: 0 | Status: COMPLETED | OUTPATIENT
Start: 2024-05-29 | End: 2024-06-01

## 2024-05-29 RX ORDER — DEXAMETHASONE 0.5 MG/5ML
10 ELIXIR ORAL ONCE
Refills: 0 | Status: COMPLETED | OUTPATIENT
Start: 2024-05-29 | End: 2024-05-29

## 2024-05-29 RX ORDER — CHLORHEXIDINE GLUCONATE 213 G/1000ML
1 SOLUTION TOPICAL
Refills: 0 | Status: DISCONTINUED | OUTPATIENT
Start: 2024-05-29 | End: 2024-06-11

## 2024-05-29 RX ORDER — HEPARIN SODIUM 5000 [USP'U]/ML
5000 INJECTION INTRAVENOUS; SUBCUTANEOUS EVERY 12 HOURS
Refills: 0 | Status: DISCONTINUED | OUTPATIENT
Start: 2024-05-29 | End: 2024-06-11

## 2024-05-29 RX ORDER — ONDANSETRON 8 MG/1
4 TABLET, FILM COATED ORAL ONCE
Refills: 0 | Status: COMPLETED | OUTPATIENT
Start: 2024-05-29 | End: 2024-05-29

## 2024-05-29 RX ORDER — POTASSIUM PHOSPHATE, MONOBASIC POTASSIUM PHOSPHATE, DIBASIC 236; 224 MG/ML; MG/ML
15 INJECTION, SOLUTION INTRAVENOUS ONCE
Refills: 0 | Status: COMPLETED | OUTPATIENT
Start: 2024-05-29 | End: 2024-05-30

## 2024-05-29 RX ORDER — PANTOPRAZOLE SODIUM 20 MG/1
40 TABLET, DELAYED RELEASE ORAL EVERY 24 HOURS
Refills: 0 | Status: DISCONTINUED | OUTPATIENT
Start: 2024-05-29 | End: 2024-06-01

## 2024-05-29 RX ORDER — ACETYLCYSTEINE 200 MG/ML
12 VIAL (ML) MISCELLANEOUS ONCE
Refills: 0 | Status: COMPLETED | OUTPATIENT
Start: 2024-05-29 | End: 2024-05-29

## 2024-05-29 RX ADMIN — SODIUM CHLORIDE 1000 MILLILITER(S): 9 INJECTION INTRAMUSCULAR; INTRAVENOUS; SUBCUTANEOUS at 11:45

## 2024-05-29 RX ADMIN — Medication 130 GRAM(S): at 18:08

## 2024-05-29 RX ADMIN — Medication 10 MILLIGRAM(S): at 11:45

## 2024-05-29 RX ADMIN — Medication 2 MILLIGRAM(S): at 10:17

## 2024-05-29 RX ADMIN — LACTULOSE 10 GRAM(S): 10 SOLUTION ORAL at 21:05

## 2024-05-29 RX ADMIN — HEPARIN SODIUM 5000 UNIT(S): 5000 INJECTION INTRAVENOUS; SUBCUTANEOUS at 17:32

## 2024-05-29 RX ADMIN — SODIUM CHLORIDE 1000 MILLILITER(S): 9 INJECTION INTRAMUSCULAR; INTRAVENOUS; SUBCUTANEOUS at 10:28

## 2024-05-29 RX ADMIN — Medication 65 GRAM(S): at 22:35

## 2024-05-29 RX ADMIN — CEFTRIAXONE 100 MILLIGRAM(S): 500 INJECTION, POWDER, FOR SOLUTION INTRAMUSCULAR; INTRAVENOUS at 14:10

## 2024-05-29 RX ADMIN — Medication 1 MILLIGRAM(S): at 09:43

## 2024-05-29 RX ADMIN — FAMOTIDINE 20 MILLIGRAM(S): 10 INJECTION INTRAVENOUS at 09:43

## 2024-05-29 RX ADMIN — LACTULOSE 10 GRAM(S): 10 SOLUTION ORAL at 17:32

## 2024-05-29 RX ADMIN — Medication 105 MILLIGRAM(S): at 14:33

## 2024-05-29 RX ADMIN — ONDANSETRON 4 MILLIGRAM(S): 8 TABLET, FILM COATED ORAL at 09:43

## 2024-05-29 RX ADMIN — CEFTRIAXONE 100 MILLIGRAM(S): 500 INJECTION, POWDER, FOR SOLUTION INTRAMUSCULAR; INTRAVENOUS at 18:08

## 2024-05-29 RX ADMIN — SODIUM CHLORIDE 1000 MILLILITER(S): 9 INJECTION INTRAMUSCULAR; INTRAVENOUS; SUBCUTANEOUS at 10:27

## 2024-05-29 RX ADMIN — Medication 310 GRAM(S): at 17:07

## 2024-05-29 RX ADMIN — PANTOPRAZOLE SODIUM 40 MILLIGRAM(S): 20 TABLET, DELAYED RELEASE ORAL at 17:33

## 2024-05-29 RX ADMIN — SODIUM CHLORIDE 1000 MILLILITER(S): 9 INJECTION INTRAMUSCULAR; INTRAVENOUS; SUBCUTANEOUS at 14:10

## 2024-05-29 RX ADMIN — Medication 100 MILLIGRAM(S): at 10:03

## 2024-05-29 RX ADMIN — SODIUM CHLORIDE 1000 MILLILITER(S): 9 INJECTION INTRAMUSCULAR; INTRAVENOUS; SUBCUTANEOUS at 09:42

## 2024-05-29 RX ADMIN — Medication 102 MILLIGRAM(S): at 10:03

## 2024-05-29 NOTE — CONSULT NOTE ADULT - ASSESSMENT
Acute liver failure 2/2 alcohol    Recommendation  - GI consult  - Transfer to [facility] for liver transplant

## 2024-05-29 NOTE — CONSULT NOTE ADULT - ASSESSMENT
ASSESSMENT      PLAN  NEURO  - Pain:  - Sedation:     RESP  - Maintain O2 saturation >92%  - AM CXRs    CV  - MAP goal >65  - Pressors:    GI/NUTRITION  - Diet:   - Protonix IV daily    RENAL/  - Corbett    HEME  - DVT ppx:    ID  - ABX:    ENDO  - Monitor blood glucose     LINES    CODE: FULL  DISPO: SICU  ASSESSMENT  38M PMH HTN, ETOH use (daily drink) PSH gastric sleeve, admitted for acute liver failure.     PLAN  NEURO  - Pain: None  - Sedation: None  - Lorazepam PRN for CIWA    RESP  - Maintain O2 saturation >92%  - AM CXRs    CV  - MAP goal >65  - Pressors: None  - 5/29 TTE ordered    GI/NUTRITION  - Diet: Reg  - Protonix IV daily  - Acetylcysteine    RENAL/  - Corbett    HEME  - DVT ppx: SQH    ID  - ABX: CTX (5/29-)  - 5/29: BCx - results pending  - 5/29: +UA, UCx pending  ENDO  - Monitor blood glucose     LINES  - PIVs    CODE: FULL  DISPO: SICU

## 2024-05-29 NOTE — ED PROVIDER NOTE - ATTENDING APP SHARED VISIT CONTRIBUTION OF CARE
Dr. Velasquez: I performed a face to face bedside interview with patient regarding history of present illness, review of symptoms and past medical history. I completed an independent physical exam.  I have discussed patient's plan of care with JEAN.   I agree with note as stated above, having amended the EMR as needed to reflect my findings.   This includes HISTORY OF PRESENT ILLNESS, HIV, PAST MEDICAL/SURGICAL/FAMILY/SOCIAL HISTORY, ALLERGIES AND HOME MEDICATIONS, REVIEW OF SYSTEMS, PHYSICAL EXAM, and any PROGRESS NOTES during the time I functioned as the attending physician for this patient.    Dr. Velasquez: This H&P has been written by myself in its entirety

## 2024-05-29 NOTE — ED ADULT TRIAGE NOTE - CHIEF COMPLAINT QUOTE
Presented to [facility] ED with tremors, hallucinations. Transfer due to elevated LFTs

## 2024-05-29 NOTE — ED PROVIDER NOTE - PHYSICAL EXAMINATION
Gen: sleepy but arousable to verbal stimuli  HEENT: Mucous membranes moist, pink conjunctivae, EOMI  CV: RRR, no clubbing/cyanosis/edema  Resp: CTAB  GI: Abdomen soft, NT, ND. Normal BS. No rebound, no guarding  : No CVAT  Neuro: A&O x 2, moving all 4 extremities  MSK: No spine or joint tenderness to palpation  Skin: jaundiced

## 2024-05-29 NOTE — ED ADULT NURSE NOTE - CCCP TRG CHIEF CMPLNT
Hospitalist Discharge Summary    Patient:  Temitope Perez  YOB: 1960    MRN: [id number]   Acct: [de-identified]    Primary Care Physician: Jayashree Fan DO    Admit date:  4/19/2021    Discharge date:  4/21/2021       Discharge Diagnoses:     1. 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 2.  Hyponatremia  3. Hypertension   4. UTI secondary to E. Coli (pansensitive)  5. Chronic left shoulder pain , partial rotator cuff tear  6. Tobacco dependence   7. Emphysema    Discharge Medications:       Kwaku Mccauley   [place] Medication Instructions [id number]    Printed on:04/21/21 1413   Medication Information                      acetaminophen (TYLENOL) 325 MG tablet  Take 325 mg by mouth every 6 hours as needed for Pain             albuterol sulfate  (90 Base) MCG/ACT inhaler  Inhale 2 puffs into the lungs 4 times daily as needed for Wheezing             amLODIPine (NORVASC) 5 MG tablet  Take 1 tablet by mouth daily             amLODIPine (NORVASC) 5 MG tablet  Take 1 tablet by mouth daily             Ascorbic Acid (VITAMIN C) 250 MG tablet  Take 250 mg by mouth daily             aspirin EC 81 MG EC tablet  Take 1 tablet by mouth daily             cephALEXin (KEFLEX) 500 MG capsule  Take 1 capsule by mouth 3 times daily for 3 days             Cholecalciferol (VITAMIN D3) 2000 units CAPS  Take by mouth             clopidogrel (PLAVIX) 75 MG tablet  Take 1 tablet by mouth daily             diphenhydrAMINE (BENADRYL) 25 MG tablet  Take 25 mg by mouth every 6 hours as needed for Itching             famotidine (PEPCID) 20 MG tablet  Take 20 mg by mouth 2 times daily             fluocinolone (DERMOTIC) 0.01 % OIL oil  Put 4 drops into the affected ear(s) twice daily x 2 weeks. After that can decrease use to 1-2 times a week for maintenance.              losartan (COZAAR) 50 MG tablet  Take 1 tablet by mouth daily MELOXICAM PO  Take 15 mLs by mouth Daily in the evening with dinner             Multiple Vitamin (MULTI-VITAMIN DAILY PO)  Take by mouth             nicotine (NICODERM CQ) 21 MG/24HR  Place 1 patch onto the skin daily             Probiotic Product (PROBIOTIC-10) CAPS  Take by mouth daily             rosuvastatin (CRESTOR) 40 MG tablet  Take 1 tablet by mouth daily                 Diet:  DIET CARDIAC; Dietary Nutrition Supplements: Standard High Calorie Oral Supplement    Activity: as tolerated    Follow-up:  in 1 week with Sivakumar Brown DO,  Follow up with neurologist Dr. Elier Brunson on 6/7/21 in [facility]. CBC:   Recent Labs     04/19/21  1400   WBC 6.7   HGB 14.7        BMP:    Recent Labs     04/21/21  0510      K 4.0      CO2 26   BUN 10   CREATININE 0.55   GLUCOSE 101*     Calcium:  Recent Labs     04/21/21  0510   CALCIUM 8.9     Glucose:  Recent Labs     04/19/21  1340   POCGLU 92     HgbA1C:   Recent Labs     04/20/21  0500   LABA1C 5.6     INR: No results for input(s): INR in the last 72 hours.   Hepatic:   Recent Labs     04/19/21  1400   ALKPHOS 102   ALT 23   AST 24   PROT 7.6   BILITOT 0.24*   LABALBU 4.4       Lipids:   Recent Labs     04/20/21  0500   CHOL 192   TRIG 172*   HDL 45       Physical Exam:    Vitals:  Patient Vitals for the past 24 hrs:   BP Temp Temp src Pulse Resp SpO2 Height   04/21/21 1430 (!) 155/85 98 °F (36.7 °C) Oral 97 18 98 % --   04/21/21 0936 -- -- -- -- -- -- 5' 6\" (1.676 m)   04/21/21 0900 (!) 158/80 97.8 °F (36.6 °C) Oral 85 18 98 % --   04/21/21 0618 -- -- -- -- -- 97 % --   04/20/21 2121 -- -- -- -- -- 98 % --   04/20/21 2002 -- -- -- -- -- 98 % --   04/20/21 1955 (!) 151/78 98.2 °F (36.8 °C) Oral 85 18 -- --   04/20/21 1700 (!) 165/82 -- -- -- -- -- --     Weight: Weight: 105 lb 8 oz (47.9 kg)       General Appearance: alert and oriented to person, place and time, in no acute distress  Cardiovascular: normal rate, regular rhythm, normal S1 and S2, no murmur  Pulmonary/Chest: clear to auscultation bilaterally- no wheezes, rales  Abdomen: soft, non-tender, non-distended, normal bowel sounds  Extremities: no cyanosis, clubbing or edema,   Skin: warm and dry, no rash or erythema  Neurological: alert, oriented,  Speech is normal, no slurring, left facial droop.  Muscle strength in upper and lower extremities 5 out of 5, sensation to touch in extremities and face is intact         Hospital Course: The patient is a 62 y.o. female presented to the emergency room for evaluation of sudden onset slurred speech, left upper extremity numbness and heaviness, dizziness. Patient states that she was sitting in front of her computer when she developed these symptoms. Heaviness and numbness in the left upper extremity was constant, she had trouble with her speech, mild generalized headaches. She denied chest pain, shortness of breath, cough, nausea/vomiting. She has a history of hypertension and states about 2 months ago was started on HCTZ. She smokes about a pack a day. No history of diabetes. She has history of chronic left shoulder pain. MRI of the shoulder 3/31/21 revealed partial rotator cuff tear. Was referred to orthopedic surgeon Dr. Matt Cordon but has not seen him yet  . States heaviness and numbness in her left arm  was different from   her chronic shoulder and arm pain. Work-up in the emergency room revealed elevated blood pressure 163/91, heart rate 87, respirations 18, temp 98.1. She was saturating 100% on room air. Sodium was 127, potassium 4.5, chloride 89, renal function normal with BUN 16 creatinine 0.69, calcium 9.4. LFTs essentially normal, glucose 97. CBC was normal.  Urinalysis revealed positive nitrates, negative leukocyte esterase, WBC 0-2, 3+ bacteria. CT scan of head was negative for acute intracranial abnormalities.   Due to patient's risk factors for CVA she was deemed appropriate for admission and was initially Coli. Treated with IV Rocephin. D/C with Keflex x 3 days. 5.  Chronic left shoulder pain . Recent MRI showing partial rotator cuff tear, patient to follow-up outpatient with Dr. Steve Melendez. 6.  Tobacco dependence - prescribed  nicotine patch.  Encouraged to quit smoking.        Disposition: home    Condition: Stable    Time Spent: 35 minutes      Electronically signed by Dave Lira MD on 4/21/2021 at 4:57 PM  Discharging Hospitalist abnormal lab result

## 2024-05-29 NOTE — H&P ADULT - ATTENDING COMMENTS
38M with AUD admitted with acute liver failure, MELD 34  +recent etoh use and tetracycline  +AMS  will initiate transplant workup. f/u cultures and imaging  may need liver biopsy if no improvement  start abx, NAC  Hepatology following

## 2024-05-29 NOTE — H&P ADULT - ASSESSMENT
38M PMH HTN, ETOH use (daily drink) PSH gastric sleeve, initially admitted to [facility] for jaundice and confusion, found to have elevated transaminases to 4000s with high MELD and withdrawal symptoms, transferred to [facility] for liver transplant workup.     #alcohol use disorder   #cirrhosis, likely 2/2 liver disease (MELD 34)   - hepatitis B surface antibody, hepatitis B surface antigen, hepatitis core antibody, hepatitis C NR   - no known endoscopy   - CT chest/ A/P with diffuse, marked hepatic steatosis   - CT head with no acute intracranial hemorrhage, mass effect or midline shift.    Recommendations:   - NAC gtt   - CIWA (please give symptom triggered ativan vs precedex, please avoid librium and phenobarbital given   - daily MELD labs   - ceftriaxone (empiric given high MELD)   - followup infectious workup, including blood cultures  - please send alpha-1 anti-trypsin (phenotype), ceruloplasma, AFP  - please send MERLY, anti-mitochondrial antibody, anti-smooth muscle antibody, anti-liver kidney antibody, immunoglobulins (IgG, IgM, IgA quantitative) for autoimmune etiologies     All recommendations are tentative until note is attested by attending.     Marilyn Naylor, PGY-5  Gastroenterology/Hepatology Fellow  Available on [employer] Teams  [phone] ([facility] Short Range Pager)  [phone] ([facility] Long Range Pager)    On Weekends/Holidays (All day) and Weekdays after 5 PM to 8AM:  For non-urgent consults, please email [email or web address]  For emergent consults, please contact on call GI team.  See Amion schedule ([facility]), Prepared Response paging system ([facility]), or call hospital  ([facility])

## 2024-05-29 NOTE — ED PROVIDER NOTE - CLINICAL SUMMARY MEDICAL DECISION MAKING FREE TEXT BOX
Patient has been having sore throat with swelling and started taking tetracycline followed by confusion and jaundice and now has some shakes and generalized weakness which mimic symptoms over the alcohol withdrawal however patient denies history of alcohol use.  Plan to check labs rule out infection with a CT of the head to rule out intracranial hemorrhage CT soft tissue neck to rule out abscess CT chest and abdomen to rule out infection pneumonia cholangitis.

## 2024-05-29 NOTE — ED ADULT NURSE NOTE - CHIEF COMPLAINT QUOTE
Presented to [facility] ED with tremors, hallucinations. Transfer due to elevated LFTs

## 2024-05-29 NOTE — ED PROVIDER NOTE - OBJECTIVE STATEMENT
Dr. Velasquez: 38M transferred from [facility] ED for hepatology eval. Pt has h/o HTN, gastric sleeve, ETOH use, drinks daily, presented to [facility] ED with ETOH WD, received lorazepam and found to be confused and jaundice. Labs concerning for fulminant hepatic failure. Pt also found to have a UTI. GI and MICU at [facility] consulted and recommended tx to [facility]. No h/o trauma.

## 2024-05-29 NOTE — ED PROVIDER NOTE - DIFFERENTIAL DIAGNOSIS
Hepatic encephalopathy, delirium from throat abscess, acute hepatic toxicity, intracranial hemorrhage, pneumonia, biliary duct obstruction, pancreatitis Differential Diagnosis

## 2024-05-29 NOTE — ED ADULT NURSE REASSESSMENT NOTE - NS ED NURSE REASSESS COMMENT FT1
MD Doe called to bed side to evaluate pt for continuous, worsening audio and visual hallucination. Per MD Doe administer additional dose of Ativan. Pt remains on CM and , in sinus tach, rate 110, SpO2 100% on room air. CIWA protocol, seizure precautions, and all safety/fall precautions remain in place. No further orders or interventions at this time.

## 2024-05-29 NOTE — ED ADULT NURSE NOTE - NSFALLHARMRISKINTERV_ED_ALL_ED
Assistance OOB with selected safe patient handling equipment if applicable/Assistance with ambulation/Communicate risk of Fall with Harm to all staff, patient, and family/Monitor gait and stability/Monitor for mental status changes and reorient to person, place, and time, as needed/Provide visual cue: red socks, yellow wristband, yellow gown, etc/Reinforce activity limits and safety measures with patient and family/Toileting schedule using arm’s reach rule for commode and bathroom/Use of alarms - bed, stretcher, chair and/or video monitoring/Bed in lowest position, wheels locked, appropriate side rails in place/Call bell, personal items and telephone in reach/Instruct patient to call for assistance before getting out of bed/chair/stretcher/Non-slip footwear applied when patient is off stretcher/[place] to call system/Physically safe environment - no spills, clutter or unnecessary equipment/Purposeful Proactive Rounding/Room/bathroom lighting operational, light cord in reach

## 2024-05-29 NOTE — CONSULT NOTE ADULT - SUBJECTIVE AND OBJECTIVE BOX
Chief Complaint:  Patient is a 38y old  Male who presents with a chief complaint of AMS      Patient is a 38y old  Male who presents with a chief complaint of AMS    HPI: 38-year-old male with history of hypertension gastric sleeve presents with jaundice and confusion. GI asked to consult for liver failure. History obtained from chart and father at bedside using language line .  Patient was brought in by EMS after his dad called the EMS when he found the pt on top of the house hallucination. States he first noticed his son was hallucinating yesterday (5.28.24). Patient recently had a sore throat and had difficulty swallowing food and was recently started on tetracycline.  Patient only took 1 dose of tetracycline and his eyes got yellow. In the ER, Pt was given Ativan for agitation. CIWA >30. Per ED, Pt initially denies EtOH use but family confirmed pt is heavy EtOH abuser. Found severe elevated LFTs and coagulation disorder.      PAST MEDICAL & SURGICAL HISTORY:  Hypertension, unspecified type      H/O gastric sleeve          REVIEW OF SYSTEMS:   General: Negative  HEENT: Negative  CV: Negative  Respiratory: Negative  GI: See HPI  : Negative  MSK: Negative  Hematologic: Negative  Skin: Negative    MEDICATIONS:   MEDICATIONS  (STANDING):    MEDICATIONS  (PRN):          DIET:          ALLERGIES:   Allergies    No Known Allergies    Intolerances        Substance Use:   (  ) never used  (  ) other:  Tobacco Usage:  (   ) never smoked   (   ) former smoker   (   ) current smoker  (     ) pack year  (        ) last cigarette date  Alcohol Usage:    Family History   IBD (  ) Yes   (  ) No  GI Malignancy (  )  Yes    (  ) No    Health Management  Last Colonoscopy:  Last Endoscopy:     VITAL SIGNS:   Vital Signs Last 24 Hrs  T(C): 37.3 (29 May 2024 13:43), Max: 37.3 (29 May 2024 13:43)  T(F): 99.1 (29 May 2024 13:43), Max: 99.1 (29 May 2024 13:43)  HR: 109 (29 May 2024 13:43) (104 - 118)  BP: 146/89 (29 May 2024 13:43) (135/82 - 153/87)  BP(mean): 105 (29 May 2024 13:43) (105 - 106)  RR: 20 (29 May 2024 13:43) (20 - 26)  SpO2: 96% (29 May 2024 13:43) (96% - 100%)    Parameters below as of 29 May 2024 12:40  Patient On (Oxygen Delivery Method): room air      I&O's Summary      PHYSICAL EXAM:   GENERAL:  No acute distress  HEENT:  NC/AT, conjunctiva clear, sclera anicteric  CHEST:  No increased effort  HEART:  Regular rate  ABDOMEN:  Soft, non-tender, non-distended, normoactive bowel sounds, no rebound or guarding  EXTREMITIES: No edema  SKIN:  Warm, dry  NEURO:  Calm, cooperative    LABS:                        13.7   9.10  )-----------( 101      ( 29 May 2024 13:56 )             40.5     Hemoglobin: 13.7 g/dL (05-29-24 @ 13:56)  Hemoglobin: 15.6 g/dL (05-29-24 @ 10:07)    05-29    129<L>  |  86<L>  |  41.5<H>  ----------------------------<  119<H>  4.6   |  24.0  |  1.72<H>    Ca    9.3      29 May 2024 10:07  Mg     1.8     05-29    TPro  6.3<L>  /  Alb  3.6  /  TBili  10.6<H>  /  DBili  x   /  AST  4845<H>  /  ALT  4619<H>  /  AlkPhos  337<H>  05-29    LIVER FUNCTIONS - ( 29 May 2024 10:07 )  Alb: 3.6 g/dL / Pro: 6.3 g/dL / ALK PHOS: 337 U/L / ALT: 4619 U/L / AST: 4845 U/L / GGT: x             PT/INR - ( 29 May 2024 13:56 )   PT: 39.2 sec;   INR: 3.70 ratio         PTT - ( 29 May 2024 13:56 )  PTT:39.0 sec    Lipase: 27 U/L (05-29-24 @ 10:07)          Ammonia, Serum: 99 umol/L (05-29-24 @ 13:55)  Ammonia, Serum: 115 umol/L (05-29-24 @ 10:07)                            RADIOLOGY & ADDITIONAL STUDIES:      ACC: [id number] EXAM:  CT ABDOMEN AND PELVIS IC   ORDERED BY: DANIEL HUERTAS     ACC: [id number] EXAM:  CT CHEST IC   ORDERED BY: DANIEL HUERTAS     PROCEDURE DATE:  05/29/2024          INTERPRETATION:  CLINICAL INFORMATION: Jaundice and shortness of breath.    COMPARISON: CT scan of the abdomen and pelvis from 11/27/2017    CONTRAST/COMPLICATIONS:  IV Contrast: Omnipaque 350  96 cc administered   4 cc discarded  Oral Contrast: NONE  Complications: None reported at time of study completion    PROCEDURE:  CT of the Chest, Abdomen and Pelvis was performed.  Sagittal and coronal reformats were performed.    FINDINGS:  CHEST:  LUNGS AND LARGE AIRWAYS: Patent central airways. Motion artifact limits   fine evaluation lung parenchyma. No focal consolidation or discrete mass   is seen.  PLEURA: No pleural effusion.  VESSELS: Within normal limits.  HEART: Heart size appears mildly prominent. Please correlate clinically.   No pericardial effusion.  MEDIASTINUM AND TERENCE: No lymphadenopathy.  CHEST WALL AND LOWER NECK: Limited evaluation of the superior mediastinum   due to motion artifact.    ABDOMEN AND PELVIS:  LIVER: Marked, diffuse hepatic steatosis not seen previously. No discrete   lesions are identified.  BILE DUCTS: Normal caliber.  GALLBLADDER: Possible sludge within the gallbladder. Question of mild   gallbladder wall thickening, although very limited in evaluation. If   there is right upper quadrant pain, right upper quadrant ultrasound would   be recommended.  SPLEEN: Within normal limits.  PANCREAS: Within normal limits.  ADRENALS: Within normal limits.  KIDNEYS/URETERS: Mild fullness and a left extrarenal pelvis. No   hydronephrosis or discrete mass appreciated.    BLADDER: Within normal limits.  REPRODUCTIVE ORGANS: Slightly heterogeneous prostate gland.    BOWEL: No bowel obstruction. Appendix within normal limits. Status post   gastric bypass surgery.  PERITONEUM: No ascites.  VESSELS: Within normal limits.  RETROPERITONEUM/LYMPH NODES: No lymphadenopathy.  ABDOMINAL WALL: Small umbilical hernia containing fat.  BONES: Within normal limits.    IMPRESSION:  Diffuse, marked hepatic steatosis which was not seen on 11/27/2017.   Please correlate clinically.    Question of mild gallbladder wall thickening. If patient has right upper   quadrant pain, right upper quadrant ultrasound would be recommended.    No focal lung consolidation. Question of mild cardiomegaly. Please   correlate with PA and lateral chest x-ray.    --- End of Report ---            ARVIND BUENO MD; Attending Radiologist  This document has been electronically signed. May 29 2024 12:07PM  05-29-24 @ 11:39           Chief Complaint:  Patient is a 38y old  Male who presents with a chief complaint of AMS      Patient is a 38y old  Male who presents with a chief complaint of AMS    HPI: 38-year-old male with history of hypertension gastric sleeve presents with jaundice and confusion. GI asked to consult for liver failure. History obtained from chart and father at bedside using language line .  Patient was brought in by EMS after his dad called the EMS when he found the pt on top of the house hallucination. States he first noticed his son was hallucinating yesterday (5.28.24). Patient recently had a sore throat and had difficulty swallowing food and was recently started on tetracycline.  Patient only took 1 dose of tetracycline and his eyes got yellow. In the ER, Pt was given Ativan for agitation. CIWA >30. Per ED, Pt initially denies EtOH use but family confirmed pt is heavy EtOH abuser. Never hospitalized for ETOH before. Found severe elevated LFTs and coagulation disorder.      PAST MEDICAL & SURGICAL HISTORY:  Hypertension, unspecified type      H/O gastric sleeve          REVIEW OF SYSTEMS:   General: Negative  HEENT: Negative  CV: Negative  Respiratory: Negative  GI: See HPI  : Negative  MSK: Negative  Hematologic: Negative  Skin: Negative    MEDICATIONS:   MEDICATIONS  (STANDING):    MEDICATIONS  (PRN):          DIET:          ALLERGIES:   Allergies    No Known Allergies    Intolerances        Substance Use:   (  ) never used  (  ) other:  Tobacco Usage:  (   ) never smoked   (   ) former smoker   (   ) current smoker  (     ) pack year  (        ) last cigarette date  Alcohol Usage:    Family History   IBD (  ) Yes   (  ) No  GI Malignancy (  )  Yes    (  ) No    Health Management  Last Colonoscopy:  Last Endoscopy:     VITAL SIGNS:   Vital Signs Last 24 Hrs  T(C): 37.3 (29 May 2024 13:43), Max: 37.3 (29 May 2024 13:43)  T(F): 99.1 (29 May 2024 13:43), Max: 99.1 (29 May 2024 13:43)  HR: 109 (29 May 2024 13:43) (104 - 118)  BP: 146/89 (29 May 2024 13:43) (135/82 - 153/87)  BP(mean): 105 (29 May 2024 13:43) (105 - 106)  RR: 20 (29 May 2024 13:43) (20 - 26)  SpO2: 96% (29 May 2024 13:43) (96% - 100%)    Parameters below as of 29 May 2024 12:40  Patient On (Oxygen Delivery Method): room air      I&O's Summary      PHYSICAL EXAM:   GENERAL:  No acute distress  HEENT:  NC/AT, conjunctiva clear, sclera anicteric  CHEST:  No increased effort  HEART:  Regular rate  ABDOMEN:  Soft, non-tender, non-distended, normoactive bowel sounds, no rebound or guarding  EXTREMITIES: No edema  SKIN:  Warm, dry, + jaundice   NEURO:  Calm, cooperative    LABS:                        13.7   9.10  )-----------( 101      ( 29 May 2024 13:56 )             40.5     Hemoglobin: 13.7 g/dL (05-29-24 @ 13:56)  Hemoglobin: 15.6 g/dL (05-29-24 @ 10:07)    05-29    129<L>  |  86<L>  |  41.5<H>  ----------------------------<  119<H>  4.6   |  24.0  |  1.72<H>    Ca    9.3      29 May 2024 10:07  Mg     1.8     05-29    TPro  6.3<L>  /  Alb  3.6  /  TBili  10.6<H>  /  DBili  x   /  AST  4845<H>  /  ALT  4619<H>  /  AlkPhos  337<H>  05-29    LIVER FUNCTIONS - ( 29 May 2024 10:07 )  Alb: 3.6 g/dL / Pro: 6.3 g/dL / ALK PHOS: 337 U/L / ALT: 4619 U/L / AST: 4845 U/L / GGT: x             PT/INR - ( 29 May 2024 13:56 )   PT: 39.2 sec;   INR: 3.70 ratio         PTT - ( 29 May 2024 13:56 )  PTT:39.0 sec    Lipase: 27 U/L (05-29-24 @ 10:07)          Ammonia, Serum: 99 umol/L (05-29-24 @ 13:55)  Ammonia, Serum: 115 umol/L (05-29-24 @ 10:07)                            RADIOLOGY & ADDITIONAL STUDIES:      ACC: [id number] EXAM:  CT ABDOMEN AND PELVIS IC   ORDERED BY: DANIEL HUERTAS     ACC: [id number] EXAM:  CT CHEST IC   ORDERED BY: DANIEL HUERTAS     PROCEDURE DATE:  05/29/2024          INTERPRETATION:  CLINICAL INFORMATION: Jaundice and shortness of breath.    COMPARISON: CT scan of the abdomen and pelvis from 11/27/2017    CONTRAST/COMPLICATIONS:  IV Contrast: Omnipaque 350  96 cc administered   4 cc discarded  Oral Contrast: NONE  Complications: None reported at time of study completion    PROCEDURE:  CT of the Chest, Abdomen and Pelvis was performed.  Sagittal and coronal reformats were performed.    FINDINGS:  CHEST:  LUNGS AND LARGE AIRWAYS: Patent central airways. Motion artifact limits   fine evaluation lung parenchyma. No focal consolidation or discrete mass   is seen.  PLEURA: No pleural effusion.  VESSELS: Within normal limits.  HEART: Heart size appears mildly prominent. Please correlate clinically.   No pericardial effusion.  MEDIASTINUM AND TERENCE: No lymphadenopathy.  CHEST WALL AND LOWER NECK: Limited evaluation of the superior mediastinum   due to motion artifact.    ABDOMEN AND PELVIS:  LIVER: Marked, diffuse hepatic steatosis not seen previously. No discrete   lesions are identified.  BILE DUCTS: Normal caliber.  GALLBLADDER: Possible sludge within the gallbladder. Question of mild   gallbladder wall thickening, although very limited in evaluation. If   there is right upper quadrant pain, right upper quadrant ultrasound would   be recommended.  SPLEEN: Within normal limits.  PANCREAS: Within normal limits.  ADRENALS: Within normal limits.  KIDNEYS/URETERS: Mild fullness and a left extrarenal pelvis. No   hydronephrosis or discrete mass appreciated.    BLADDER: Within normal limits.  REPRODUCTIVE ORGANS: Slightly heterogeneous prostate gland.    BOWEL: No bowel obstruction. Appendix within normal limits. Status post   gastric bypass surgery.  PERITONEUM: No ascites.  VESSELS: Within normal limits.  RETROPERITONEUM/LYMPH NODES: No lymphadenopathy.  ABDOMINAL WALL: Small umbilical hernia containing fat.  BONES: Within normal limits.    IMPRESSION:  Diffuse, marked hepatic steatosis which was not seen on 11/27/2017.   Please correlate clinically.    Question of mild gallbladder wall thickening. If patient has right upper   quadrant pain, right upper quadrant ultrasound would be recommended.    No focal lung consolidation. Question of mild cardiomegaly. Please   correlate with PA and lateral chest x-ray.    --- End of Report ---            ARVIND BUENO MD; Attending Radiologist  This document has been electronically signed. May 29 2024 12:07PM  05-29-24 @ 11:39         Chief Complaint:  Patient is a 38y old  Male who presents with a chief complaint of AMS    Patient is a 38y old  Male who presents with a chief complaint of AMS    HPI: 38-year-old male with history of hypertension gastric sleeve presents with jaundice and confusion. GI asked to consult for liver failure. History obtained from chart and father at bedside using language line .  Patient was brought in by EMS after his dad called the EMS when he found the pt on top of the house hallucination. States he first noticed his son was hallucinating yesterday (5.28.24). Patient recently had a sore throat and had difficulty swallowing food and was recently started on tetracycline.  Patient only took 1 dose of tetracycline and his eyes got yellow. In the ER, Pt was given Ativan for agitation. CIWA >30. Per ED, Pt initially denies EtOH use but family confirmed pt is heavy EtOH abuser. Never hospitalized for ETOH before. Found severe elevated LFTs and coagulation disorder.    PAST MEDICAL & SURGICAL HISTORY:  Hypertension, unspecified type  H/O gastric sleeve    REVIEW OF SYSTEMS:   General: Negative  HEENT: Negative  CV: Negative  Respiratory: Negative  GI: See HPI  : Negative  MSK: Negative  Hematologic: Negative  Skin: Negative    MEDICATIONS:   MEDICATIONS  (STANDING):    MEDICATIONS  (PRN):    DIET: NPO    ALLERGIES:   No Known Allergies    VITAL SIGNS:   Vital Signs Last 24 Hrs  T(C): 37.3 (29 May 2024 13:43), Max: 37.3 (29 May 2024 13:43)  T(F): 99.1 (29 May 2024 13:43), Max: 99.1 (29 May 2024 13:43)  HR: 109 (29 May 2024 13:43) (104 - 118)  BP: 146/89 (29 May 2024 13:43) (135/82 - 153/87)  BP(mean): 105 (29 May 2024 13:43) (105 - 106)  RR: 20 (29 May 2024 13:43) (20 - 26)  SpO2: 96% (29 May 2024 13:43) (96% - 100%)    Parameters below as of 29 May 2024 12:40  Patient On (Oxygen Delivery Method): room air    I&O's Summary    PHYSICAL EXAM:   Limited examination   GENERAL:  Lethargic, responds to painful stimuli only, intermittently opens eyes, no vocal response, purposeful withdrawal  HEENT:  NC/AT, conjunctiva clear, sclera icteric  CHEST:  No increased effort  HEART:  Regular rate  ABDOMEN:  Soft, non-distended, normoactive bowel sounds, no ascites, liver edge non-palpable.   EXTREMITIES: No edema  SKIN:  Warm, dry, Jaundice   NEURO:  + asterixis     LABS:                        13.7   9.10  )-----------( 101      ( 29 May 2024 13:56 )             40.5     Hemoglobin: 13.7 g/dL (05-29-24 @ 13:56)  Hemoglobin: 15.6 g/dL (05-29-24 @ 10:07)    05-29    129<L>  |  86<L>  |  41.5<H>  ----------------------------<  119<H>  4.6   |  24.0  |  1.72<H>    Ca    9.3      29 May 2024 10:07  Mg     1.8     05-29    TPro  6.3<L>  /  Alb  3.6  /  TBili  10.6<H>  /  DBili  x   /  AST  4845<H>  /  ALT  4619<H>  /  AlkPhos  337<H>  05-29    LIVER FUNCTIONS - ( 29 May 2024 10:07 )  Alb: 3.6 g/dL / Pro: 6.3 g/dL / ALK PHOS: 337 U/L / ALT: 4619 U/L / AST: 4845 U/L / GGT: x           PT/INR - ( 29 May 2024 13:56 )   PT: 39.2 sec;   INR: 3.70 ratio    PTT - ( 29 May 2024 13:56 )  PTT:39.0 sec    Lipase: 27 U/L (05-29-24 @ 10:07)  Ammonia, Serum: 115 umol/L (05-29-24 @ 10:07)      RADIOLOGY & ADDITIONAL STUDIES:    ACC: [id number] EXAM:  CT ABDOMEN AND PELVIS IC   ORDERED BY: DANIEL HUERTAS   ACC: [id number] EXAM:  CT CHEST IC   ORDERED BY: DANIEL HUERTAS   PROCEDURE DATE:  05/29/2024    INTERPRETATION:  CLINICAL INFORMATION: Jaundice and shortness of breath.  COMPARISON: CT scan of the abdomen and pelvis from 11/27/2017  CONTRAST/COMPLICATIONS:  IV Contrast: Omnipaque 350  96 cc administered   4 cc discarded  Oral Contrast: NONE  Complications: None reported at time of study completion    PROCEDURE:  CT of the Chest, Abdomen and Pelvis was performed.  Sagittal and coronal reformats were performed.    FINDINGS:  CHEST:  LUNGS AND LARGE AIRWAYS: Patent central airways. Motion artifact limits   fine evaluation lung parenchyma. No focal consolidation or discrete mass   is seen.  PLEURA: No pleural effusion.  VESSELS: Within normal limits.  HEART: Heart size appears mildly prominent. Please correlate clinically.   No pericardial effusion.  MEDIASTINUM AND TERENCE: No lymphadenopathy.  CHEST WALL AND LOWER NECK: Limited evaluation of the superior mediastinum   due to motion artifact.    ABDOMEN AND PELVIS:  LIVER: Marked, diffuse hepatic steatosis not seen previously. No discrete   lesions are identified.  BILE DUCTS: Normal caliber.  GALLBLADDER: Possible sludge within the gallbladder. Question of mild   gallbladder wall thickening, although very limited in evaluation. If   there is right upper quadrant pain, right upper quadrant ultrasound would   be recommended.  SPLEEN: Within normal limits.  PANCREAS: Within normal limits.  ADRENALS: Within normal limits.  KIDNEYS/URETERS: Mild fullness and a left extrarenal pelvis. No   hydronephrosis or discrete mass appreciated.    BLADDER: Within normal limits.  REPRODUCTIVE ORGANS: Slightly heterogeneous prostate gland.    BOWEL: No bowel obstruction. Appendix within normal limits. Status post   gastric bypass surgery.  PERITONEUM: No ascites.  VESSELS: Within normal limits.  RETROPERITONEUM/LYMPH NODES: No lymphadenopathy.  ABDOMINAL WALL: Small umbilical hernia containing fat.  BONES: Within normal limits.    IMPRESSION:  Diffuse, marked hepatic steatosis which was not seen on 11/27/2017.   Please correlate clinically.    Question of mild gallbladder wall thickening. If patient has right upper   quadrant pain, right upper quadrant ultrasound would be recommended.    No focal lung consolidation. Question of mild cardiomegaly. Please   correlate with PA and lateral chest x-ray.    --- End of Report ---    ARVIND BUENO MD; Attending Radiologist  This document has been electronically signed. May 29 2024 12:07PM  05-29-24 @ 11:39

## 2024-05-29 NOTE — PATIENT PROFILE ADULT - HEALTH LITERACY
Problem: Bowel/Gastric:  Goal: Will not experience complications related to bowel motility    Intervention: Assess baseline bowel pattern  Normoactive x4  Intervention: Implement interventions to promote bowel evacuation if inadequate bowel movements in past 48 hours  Early ambulation and Senna  Intervention: Implement Bowel Protocol, if applicable  Will implement if needed. Not currently needed      Problem: Pain Management  Goal: Pain level will decrease to patient's comfort goal    Intervention: Follow pain managment plan developed in collaboration with patient and Interdisciplinary Team  Done. Pt refusing pain medication due to low pain levels         no

## 2024-05-29 NOTE — ED PROVIDER NOTE - CARE PLAN
Principal Discharge DX:	Acute liver failure  Secondary Diagnosis:	Severe alcohol withdrawal without perceptual disturbances, uncomplicated   1

## 2024-05-29 NOTE — CONSULT NOTE ADULT - ASSESSMENT
38-year-old male with history of hypertension gastric sleeve presents with jaundice and confusion. GI asked to consult for liver dysfunction.    #acute liver failure   CT Abdomen and Pelvis w/ IV Cont (05.29.24) Diffuse, marked hepatic steatosis which was not seen on 11/27/2017.   INR: 3.82, AST/ALT: 4845/4619, Bili: 10.6, alk phos: 337  -will need transfer to [facility] for further transplant eval/management  d/w ER  _________________________________________________________________  Assessment and recommendations are final when note is signed by the attending physician.    38-year-old male with history of hypertension gastric sleeve presents with jaundice and confusion. GI asked to consult for liver dysfunction.    #acute liver failure   CT Abdomen and Pelvis w/ IV Cont (05.29.24) Diffuse, marked hepatic steatosis which was not seen on 11/27/2017.   INR: 3.82, AST/ALT: 4845/4619, Bili: 10.6, alk phos: 337  -unclear whether Drug induced for alcohol related  -will need transfer to [facility] for further transplant eval/management  d/w ER  _________________________________________________________________  Assessment and recommendations are final when note is signed by the attending physician.    38-year-old male with history of hypertension gastric sleeve presents with jaundice and confusion. GI asked to consult for liver dysfunction.    #acute liver failure   CT Abdomen and Pelvis w/ IV Cont (05.29.24) Diffuse, marked hepatic steatosis which was not seen on 11/27/2017.   INR: 3.82, AST/ALT: 4845/4619, Bili: 10.6, alk phos: 337  -unclear whether Drug induced for alcohol related  -will need emergent transfer to [facility] for further Hepatology transplant eval/management  d/w ER  _________________________________________________________________  Assessment and recommendations are final when note is signed by the attending physician.

## 2024-05-29 NOTE — ED PROVIDER NOTE - PROGRESS NOTE DETAILS
spoke in n [place]  with dad at [phone] who had called the ambulance and endorse that patient drinks very heavily and has not had any drink in the last 4 days.  Patient had a MELD score of 37 and is in acute liver failure with a high CIWA, priority CT call and also MICU consult was called MICU and GI recommend transfer to [facility] for possible liver transplant. Spoke to transfer center and spoek to Dr. Garcia. ED to ED transfer accepted, helicopter.

## 2024-05-29 NOTE — PATIENT PROFILE ADULT - FALL HARM RISK - HARM RISK INTERVENTIONS
Assistance with ambulation/Assistance OOB with selected safe patient handling equipment/Communicate Risk of Fall with Harm to all staff/Monitor for mental status changes/Monitor gait and stability/Reinforce activity limits and safety measures with patient and family/Tailored Fall Risk Interventions/Toileting schedule using arm’s reach rule for commode and bathroom/Use of alarms - bed, chair and/or voice tab/Visual Cue: Yellow wristband and red socks/Bed in lowest position, wheels locked, appropriate side rails in place/Call bell, personal items and telephone in reach/Instruct patient to call for assistance before getting out of bed or chair/Non-slip footwear when patient is out of bed/[place] to call system/Physically safe environment - no spills, clutter or unnecessary equipment/Purposeful Proactive Rounding/Room/bathroom lighting operational, light cord in reach

## 2024-05-29 NOTE — H&P ADULT - HISTORY OF PRESENT ILLNESS
38M PMH HTN, ETOH use (daily drink) PSH gastric sleeve, initially admitted to [facility] for jaundice and confusion, found to have elevated transaminases to 4000s with high MELD and withdrawal symptoms, transferred to [facility] for liver transplant workup.     Majority of history obtained from chart as patient is encephalopathic and unable to provide reliable history.     Per chart, patient was brought to hospital by father after found at the roof of the house hallucinating. Patient had recently also been started on tetracycline for sore throat and reports that he became jaundiced afterwards. Patient reports no alcohol use disorder and that last drink was 6 days prior to admission, but per family, patient is heavy alcohol user. This is first hospitalization for alcohol decompensation.

## 2024-05-29 NOTE — ED PROVIDER NOTE - OBJECTIVE STATEMENT
38-year-old male with history of hypertension gastric sleeve presents with jaundice and confusion.  Patient was brought in by EMS after his dad called the EMS as he was wandering on the streets.  Patient states that he has been shaking and his legs have been shaking and he is very weak and tired and he gets short of breath with even 2 stairs.  Patient states that his dad saw him like that and then call EMS.  Patient lives alone and denies alcohol use.  Patient recently had a sore throat and had difficulty swallowing food and was recently started on tetracycline.  Patient only took 1 dose of tetracycline and his eyes got yellow.  Patient denies any drugs nausea vomiting.  No fever chills.

## 2024-05-29 NOTE — ED ADULT NURSE NOTE - OBJECTIVE STATEMENT
39 y/o male, A&O x1 oriented to self, PMH ETOH presents to the ED as [facility] c/o ETOH withdrawal and elevated liver enzymes. Pt was a transfer from [facility], per [facility] pt was AMS x2 days, last drink was 4 days ago. Per family pt was confused, vomiting, and unresponsive. Pt presented to [facility] w/ DTs received 100 thiamine, 4 zofran, and 3 Ativan. Pt presents to [facility] sleepy and flat, spontaneous unlabored breathing, no drooling airway patent, unable to follow commands, answering questions slowly, strong peripheral pulses, abdomen rigid distended, pupils bilaterally 4mm, skin clean dry intact. Pt unable to endorse signs and symptoms. Pt placed on continuous monitoring, safety and comfort measures maintained.

## 2024-05-29 NOTE — ED ADULT NURSE NOTE - NS ED NURSE TRANSPORT WITH
ED Tech/Cardiac Monitor/Defib/ACLS/Rescue Kit/O2/BVM/ACLS Rescue Kit ED Tech/Cardiac Monitor/Defib/ACLS/Rescue Kit/O2/BVM/IV pump/pulse ox/ACLS Rescue Kit

## 2024-05-29 NOTE — ED ADULT NURSE NOTE - BREATHING, MLM
[address] [facility] PHYSICAL THERAPY  [address] [facility] SD:[phone] Fx: [phone]  Plan of Care / Statement of Necessity for Physical Therapy Services     Patient Name: Karin Loaiza : 1990   Medical   Diagnosis: General Weakness Treatment Diagnosis: Central pontine myelinolysis ([facility] [address] [G37.2]  Polyneuropathy [G62.9]  Imbalance [R26.89]   Onset Date: 3/24/22     Referral Source: MAHESH Nickerson -* Start of Care [facility]): 3/3/2023   Prior Hospitalization: See medical history Provider #: [id number]   Prior Level of Function:  Gym activities, Woodworking   Comorbidities: Left Hemiparesis, Anxiety, Depression, Hx of Opoid Abuse per PMHx. Medications: Verified on Patient Summary List     Assessment / key information:  Pt is a 33 yr old male with CC of Generalized Weakness,, Imbalance, poor coordination, left >right side weakness due to effects from  Central pontine myelinolysis ([address] (Primary Dx); He reports complete paralysis of left side and speech loss with initial onset in 2022. Pt has improved with Rehab and OPPT previously. Pt present with residual coordination deficits, decreased dynamic balance tolerance and fatigue. FGA=. He has been doing ex's at the gym but would like to resume PT to improve strength to his left side , core strength and coordination. Hip L (1-5) R (1-5)   Hip Flexion 4- 4+   Hip Ext 4-  4+   Hip ABD 4- 4+    Hip ADD 4 4+    Hip ER 4 4+    Hip IR 4 4+      Pt deon benefit from skilled PT to improve dynamic and static balance, strength and coordination to return to ADL's and improve QOL.        Evaluation Complexity:  History:  MEDIUM  Complexity : 1-2 comorbidities / personal factors will impact the outcome/ POC ; Examination:  MEDIUM Complexity : 3 Standardized tests and measures addressin body structure, function, activity limitation and / or participation in recreation  ;Presentation:  MEDIUM Complexity : Evolving with changing characteristics  ; Clinical Decision Making:  MEDIUM Complexity : FOTO score of 26-74 FOTO score = an established functional score where 100 = no disability  Overall Complexity Rating: MEDIUM  Problem List: pain affecting function, decrease ROM, decrease strength, edema affection function, impaired gait/balance, decrease ADL/functional abilities, decrease activity tolerance, decrease flexibility/joint mobility, and decrease transfer abilities    Treatment Plan may include any combination of the followin Therapeutic Exercise, 00042 Neuromuscular Re-Education, 61177 Manual Therapy, 54211 Therapeutic Activity, 62161 Self Care/Home Management, 01091 Electrical Stim unattended, 61953 Electrical Stim attended, 85057 Vasopneumatic Device, 26762 Gait Training, and 01783 Ultrasound  Patient / Family readiness to learn indicated by: asking questions, trying to perform skills, and interest  Persons(s) to be included in education: patient (P)  Barriers to Learning/Limitations: none  Measures taken if barriers to learning present: none  Patient Goal (s): improve strength, cordination  Patient Self Reported Health Status: good  Rehabilitation Potential: good    Short Term Goals: To be accomplished in 1 weeks  Goal:Pt will be compliant with core strengthening activities incliding planks/side lying and Prone. Status at evaluation/last progress note: Attempted at eval.   Long Term Goals: To be accomplished in 4 weeks  Goal:Pt will increase FOTO score by 7  pts to improve function. Status at evaluation/last progress note:NA     2. Goal:Pt will increase FGA score to >25/30 to improve with dynamic balance during ambulation function. Status at evaluation/last progress note:FGA=21/30     3. Goal:Pt will increase Left Hip ext /abd and HS strength to 4/5 or greater to ease with ambulation and ADL's.   Hip L (1-5) R (1-5)   Hip Flexion 4- 4+   Hip Ext 4-  4+   Hip ABD 4- 4+    Hip ADD 4 4+    Hip ER 4 4+    Hip IR 4 4+      Status at evaluation/last progress note: see above. 4.   Goal:Pt will report at least 80% subjective improvement since start of care in order to demonstrate improved overall QOL   Status at evaluation/last progress note:0% since onset. Frequency / Duration: Patient to be seen 2-3 times per week for 4 weeks    Patient/ Caregiver education and instruction: Diagnosis, prognosis, self care, activity modification, and exercises [x]  Plan of care has been reviewed with ADIN Aguila, PT       3/3/2023       6:00 PM  ===================================================================  I certify that the above Therapy Services are being furnished while the patient is under my care. I agree with the treatment plan and certify that this therapy is necessary. [de-identified] Signature:_________________________   DATE:_________   TIME:________                           MAHESH Duarte -*    ** Signature, Date and Time must be completed for valid certification **  Please [facility] or you may fax the signed copy to [phone]. Thank you. Spontaneous, unlabored and symmetrical

## 2024-05-29 NOTE — CONSULT NOTE ADULT - ASSESSMENT
38M PMH HTN, ETOH use (daily drink) PSH gastric sleeve, initially admitted to [facility] for jaundice and confusion, found to have elevated transaminases to 4000s with high MELD and withdrawal symptoms, transferred to [facility] for liver transplant workup.     #alcohol use disorder   #cirrhosis, likely 2/2 liver disease (MELD 34)   - hepatitis B surface antibody, hepatitis B surface antigen, hepatitis core antibody, hepatitis C NR   - no known endoscopy   - CT chest/ A/P with diffuse, marked hepatic steatosis   - CT head with no acute intracranial hemorrhage, mass effect or midline shift.    Recommendations:   - NAC gtt   - CIWA (please give symptom triggered ativan vs precedex, please avoid librium and phenobarbital given   - daily MELD labs   - ceftriaxone (empiric given high MELD)   - followup infectious workup, including blood cultures  - please send alpha-1 anti-trypsin (phenotype), ceruloplasma, AFP  - please send MERLY, anti-mitochondrial antibody, anti-smooth muscle antibody, anti-liver kidney antibody, immunoglobulins (IgG, IgM, IgA quantitative) for autoimmune etiologies     All recommendations are tentative until note is attested by attending.     Marilyn Naylor, PGY-5  Gastroenterology/Hepatology Fellow  Available on [employer] Teams  [phone] ([facility] Short Range Pager)  [phone] ([facility] Long Range Pager)    On Weekends/Holidays (All day) and Weekdays after 5 PM to 8AM:  For non-urgent consults, please email [email or web address] or [email or web address]  For emergent consults, please contact on call GI team.  See Amion schedule ([facility]), Rapid Pathogen Screening paging system ([facility]), or call hospital  ([facility])

## 2024-05-29 NOTE — CONSULT NOTE ADULT - ATTENDING COMMENTS
38yr M transferred for liver evaluation. regular tylenol use but otherwise no explainable reason for LFT abnl    ammonia 125  aaox3 but encephalopathic  etoh level <10  on lactulose  concern for withdrawal, CIWA monitor 0 last check  on RA  stable hemodynamics, tachycardic  elevated lact likely in s/o acute liver failure  TTE pending  reg diet  on protonix   NAC  LFTs downtrending  no BMs yet  adequate urine output, voiding  Cr wnl  on sqh  stable hb  mild thrombocytopenia  ceftriaxone empiric  ua pos, bcx pending results  procal 7.9

## 2024-05-29 NOTE — ED PROVIDER NOTE - CLINICAL SUMMARY MEDICAL DECISION MAKING FREE TEXT BOX
Dr. Velasquez: Pt transferred from [facility] for hepatic failure due to ETOH use. Also found to have a UTI, but afebrile, no leukocytosis, lactic acidosis due to liver failure and not sepsis. IV abx given, d/w hepatology attending at bedside who spoke with surgery team and recommended admission to SICU. Hepatology team to discuss and order NAC as per their recs.

## 2024-05-29 NOTE — ED ADULT TRIAGE NOTE - NS ED NURSE BANDS TYPE
Name band; pt complaining of pain to back of head and left hip following a mechanical fall, denies LOC

## 2024-05-29 NOTE — H&P ADULT - NSHPPHYSICALEXAM_GEN_ALL_CORE
GENERAL: Patient appears confused  HEENT:  NCAT, + scleral icterus  CHEST: no resp distress  HEART:  RRR  ABDOMEN:  Soft, non-tender, non-distended, no masses  EXTREMITIES:  No cyanosis, clubbing, or edema  SKIN:  No rash/erythema/ecchymoses/petechiae/wounds/abscess/warm/dry  NEURO:  Alert and oriented x 1

## 2024-05-29 NOTE — CONSULT NOTE ADULT - SUBJECTIVE AND OBJECTIVE BOX
SICU Consultation Note  =====================================================  HPI:      Surgery Information  OR time:      EBL:          IV Fluids:       Blood Products:   UOP:          PAST MEDICAL & SURGICAL HISTORY:  Hypertension, unspecified type      H/O gastric sleeve        Home Meds: Home Medications:    Allergies: Allergies    No Known Allergies    Intolerances      Soc:   Advanced Directives: Presumed Full Code     ROS:    REVIEW OF SYSTEMS    [ ] A ten-point review of systems was otherwise negative except as noted.  [ ] Due to altered mental status/intubation, subjective information were not able to be obtained from the patient. History was obtained, to the extent possible, from review of the chart and collateral sources of information.      CURRENT MEDICATIONS:   --------------------------------------------------------------------------------------  Neurologic Medications  LORazepam   Injectable 2 milliGRAM(s) IV Push every 1 hour PRN CIWA-Ar score 8 or greater    Respiratory Medications    Cardiovascular Medications    Gastrointestinal Medications  lactulose Syrup 10 Gram(s) Oral every 4 hours  pantoprazole  Injectable 40 milliGRAM(s) IV Push every 24 hours    Genitourinary Medications    Hematologic/Oncologic Medications    Antimicrobial/Immunologic Medications  cefTRIAXone   IVPB 2000 milliGRAM(s) IV Intermittent every 24 hours  rifAXIMin 550 milliGRAM(s) Oral two times a day    Endocrine/Metabolic Medications    Topical/Other Medications  acetylcysteine IVPB 150 Gram(s) IV Intermittent once  acetylcysteine IVPB 150 Gram(s) IV Intermittent every 24 hours  acetylcysteine IVPB 50 Gram(s) IV Intermittent once  chlorhexidine 2% Cloths 1 Application(s) Topical <User Schedule>    --------------------------------------------------------------------------------------    VITAL SIGNS, INS/OUTS (last 24 hours):  --------------------------------------------------------------------------------------  ICU Vital Signs Last 24 Hrs  T(C): 36.7 (29 May 2024 15:35), Max: 37.3 (29 May 2024 13:43)  T(F): 98 (29 May 2024 15:35), Max: 99.1 (29 May 2024 13:43)  HR: 114 (29 May 2024 15:35) (104 - 118)  BP: 143/83 (29 May 2024 15:35) (118/81 - 153/87)  BP(mean): 108 (29 May 2024 15:35) (87 - 108)  ABP: --  ABP(mean): --  RR: 25 (29 May 2024 15:35) (20 - 26)  SpO2: 100% (29 May 2024 15:35) (96% - 100%)    O2 Parameters below as of 29 May 2024 15:35  Patient On (Oxygen Delivery Method): room air          I&O's Summary    --------------------------------------------------------------------------------------    EXAM:  GENERAL: NAD, lying in bed   NEURO: AOx3, awake alert appropriate  HEENT: NCAT, trachea midline  PULM: Respirations non-labored  ABD: Soft, non-tender, non-distended, no peritonitis/rebound tenderness,   EXT: Warm, well perfused   LINES:    LABS  --------------------------------------------------------------------------------------                        13.7   9.10  )-----------( 101      ( 29 May 2024 13:56 )             40.5   05-29    131<L>  |  97  |  35<H>  ----------------------------<  111<H>  4.8   |  22  |  1.40<H>    Ca    8.5      29 May 2024 13:56  Mg     1.8     05-29    TPro  5.9<L>  /  Alb  3.1<L>  /  TBili  10.0<H>  /  DBili  x   /  AST  3428<H>  /  ALT  3746<H>  /  AlkPhos  297<H>  05-29  Urinalysis Basic - ( 29 May 2024 13:56 )    Color: x / Appearance: x / SG: x / pH: x  Gluc: 111 mg/dL / Ketone: x  / Bili: x / Urobili: x   Blood: x / Protein: x / Nitrite: x   Leuk Esterase: x / RBC: x / WBC x   Sq Epi: x / Non Sq Epi: x / Bacteria: x    PT/INR - ( 29 May 2024 13:56 )   PT: 39.2 sec;   INR: 3.70 ratio         PTT - ( 29 May 2024 13:56 )  PTT:39.0 sec  --------------------------------------------------------------------------------------     SICU Consultation Note  =====================================================  HPI: 38M PMH HTN, ETOH use (daily drink) PSH gastric sleeve, initially admitted to [facility] for jaundice and confusion, BIBEMS as pt was wondering the streets. Noted to have significantly elevated LFTs (4000s), MELD 47, high CIWA, transfer for liver evaluation.      At [facility], afebrile, , BP stable. Labs notable for INR 3.7, AST 3428, ALT 3746, tbili 10, Cr 1.4, Ammonia 99, lactate 3.0. Imaging only notable for hepatostatosis, thickened GB.     SICU for acute liver failure     PAST MEDICAL & SURGICAL HISTORY:  Hypertension, unspecified type  H/O gastric sleeve    Home Meds: Home Medications:    Allergies: Allergies    No Known Allergies    Intolerances      Soc:   Advanced Directives: Presumed Full Code     ROS:    REVIEW OF SYSTEMS    [ ] A ten-point review of systems was otherwise negative except as noted.  [ ] Due to altered mental status/intubation, subjective information were not able to be obtained from the patient. History was obtained, to the extent possible, from review of the chart and collateral sources of information.      CURRENT MEDICATIONS:   --------------------------------------------------------------------------------------  Neurologic Medications  LORazepam   Injectable 2 milliGRAM(s) IV Push every 1 hour PRN CIWA-Ar score 8 or greater    Respiratory Medications    Cardiovascular Medications    Gastrointestinal Medications  lactulose Syrup 10 Gram(s) Oral every 4 hours  pantoprazole  Injectable 40 milliGRAM(s) IV Push every 24 hours    Genitourinary Medications    Hematologic/Oncologic Medications    Antimicrobial/Immunologic Medications  cefTRIAXone   IVPB 2000 milliGRAM(s) IV Intermittent every 24 hours  rifAXIMin 550 milliGRAM(s) Oral two times a day    Endocrine/Metabolic Medications    Topical/Other Medications  acetylcysteine IVPB 150 Gram(s) IV Intermittent once  acetylcysteine IVPB 150 Gram(s) IV Intermittent every 24 hours  acetylcysteine IVPB 50 Gram(s) IV Intermittent once  chlorhexidine 2% Cloths 1 Application(s) Topical <User Schedule>    --------------------------------------------------------------------------------------    VITAL SIGNS, INS/OUTS (last 24 hours):  --------------------------------------------------------------------------------------  ICU Vital Signs Last 24 Hrs  T(C): 36.7 (29 May 2024 15:35), Max: 37.3 (29 May 2024 13:43)  T(F): 98 (29 May 2024 15:35), Max: 99.1 (29 May 2024 13:43)  HR: 114 (29 May 2024 15:35) (104 - 118)  BP: 143/83 (29 May 2024 15:35) (118/81 - 153/87)  BP(mean): 108 (29 May 2024 15:35) (87 - 108)  ABP: --  ABP(mean): --  RR: 25 (29 May 2024 15:35) (20 - 26)  SpO2: 100% (29 May 2024 15:35) (96% - 100%)    O2 Parameters below as of 29 May 2024 15:35  Patient On (Oxygen Delivery Method): room air          I&O's Summary    --------------------------------------------------------------------------------------    EXAM:  GENERAL: NAD, lying in bed   NEURO: AOx0, awake  HEENT: NCAT, trachea midline  PULM: Respirations non-labored  ABD: Soft, non-tender, non-distended, no peritonitis/rebound tenderness,   EXT: Warm, well perfused   LINES:    LABS  --------------------------------------------------------------------------------------                        13.7   9.10  )-----------( 101      ( 29 May 2024 13:56 )             40.5   05-29    131<L>  |  97  |  35<H>  ----------------------------<  111<H>  4.8   |  22  |  1.40<H>    Ca    8.5      29 May 2024 13:56  Mg     1.8     05-29    TPro  5.9<L>  /  Alb  3.1<L>  /  TBili  10.0<H>  /  DBili  x   /  AST  3428<H>  /  ALT  3746<H>  /  AlkPhos  297<H>  05-29  Urinalysis Basic - ( 29 May 2024 13:56 )    Color: x / Appearance: x / SG: x / pH: x  Gluc: 111 mg/dL / Ketone: x  / Bili: x / Urobili: x   Blood: x / Protein: x / Nitrite: x   Leuk Esterase: x / RBC: x / WBC x   Sq Epi: x / Non Sq Epi: x / Bacteria: x    PT/INR - ( 29 May 2024 13:56 )   PT: 39.2 sec;   INR: 3.70 ratio         PTT - ( 29 May 2024 13:56 )  PTT:39.0 sec  --------------------------------------------------------------------------------------    < from: CT Neck Soft Tissue w/ IV Cont (05.29.24 @ 11:40) >  IMPRESSION:    Enlarged palatine tonsilswithout hyperemia or surrounding infiltrative   changes.    No lymphadenopathy.    No soft tissue abscess.    No periapical lucencies.    Salivary glands unremarkable.    < end of copied text >  < from: CT Abdomen and Pelvis w/ IV Cont (05.29.24 @ 11:39) >  IMPRESSION:  Diffuse, marked hepatic steatosis which was not seen on 11/27/2017.   Please correlate clinically.    Question of mild gallbladder wall thickening. If patient has right upper   quadrant pain, right upper quadrant ultrasound would be recommended.    < end of copied text >  < from: CT Head No Cont (05.29.24 @ 11:27) >  IMPRESSION:    No acute intracranial hemorrhage, mass effect or midline shift.    < end of copied text >

## 2024-05-29 NOTE — ED ADULT TRIAGE NOTE - CHIEF COMPLAINT QUOTE
Pt BIBEMS from home for AMS. Per EMS pt started taking tetra and became altered and wondering the streets. Eyes jaundice. Denies alcohol/drug use. Tachy on arrival. EKG in progress. Placed in yellow gown, belongings removed.

## 2024-05-29 NOTE — ED ADULT NURSE NOTE - NS ED NURSE RECORD ANOTHER VITAL SIGN
[Breast Self Exam] : breast self exam [Nutrition] : nutrition [Exercise] : exercise [Vitamins/Supplements] : vitamins/supplements [STD (testing, results, tx)] : STD (testing, results, tx) [Safe Sexual Practices] : safe sexual practices Yes

## 2024-05-29 NOTE — CONSULT NOTE ADULT - NS ATTEND AMEND GEN_ALL_CORE FT
Mr. Barajas is a 38 year old gentleman who presented with acute altered mentation, according to family had been hallucinating both visual and auditory for the past 1 week (possibly in the setting of alcohol withdrawal), Patient has a history of alcohol use disorder but importantly has never been hospitalized per family. He also recently was given Tetracycline for a sore throat for which he was instructed to stop alcohol use which may have precipitated this event although can not accurately confirm story given patient condition and majority of events recounted by family at bedside. Drug induced liver injury consistent with marked elevation in ALT/AST. No known acetaminophen / excessive NSAID exposure. Serologies not available which also could be consistent with liver chemistries. No history of prior known liver disease / cirrhosis and limited evidence to suggest underlying fibrosis as best able to quantify at this time. No evidence of intra-cranial hemorrhage or edema on CT head. No evidence to suggest sepsis (cultures reportedly sent).     In the setting of acute encephalopathy grade 2-3 (ammonia 115, +Asterixis/Clonus) albeit evaluated post Ativan administration, elevated INR (>3.5), lactic acidosis (pH 7.4 suspect likely higher prior to resuscitation given initial lactate 6.6) and acute kidney injury (Hgb/HCT hyperconcentration, presumably pre-renal azotemia/ATN) concern for worsening acute liver failure, discussed with Hepatology as well as transplant team in [place], would transfer patient to [facility] given transplant capability / evaluation. Would start NAC gtt in the interim while in transit to [facility].     Discussed current plan of care with patient's family (mother / father / grandmother) at bedside in the emergency department. Note evaluation limited as patient actively being transferred for emergent evaluation. Patient current in transit to [facility].

## 2024-05-29 NOTE — ED PROVIDER NOTE - NSICDXPASTSURGICALHX_GEN_ALL_CORE_FT
Spoke with pt, pt stated it wasn't a good time to schedule f/u. Pt would like a c/b tomorrow to schedule f/u       PAST SURGICAL HISTORY:  H/O gastric sleeve

## 2024-05-29 NOTE — ED ADULT NURSE NOTE - NS ED NURSE LEVEL OF CONSCIOUSNESS ORIENTATION
Oriented - self; Oriented - place; Oriented - time/Asking repetitive questions/Hallucination - visual/Hallucination - audio

## 2024-05-29 NOTE — CONSULT NOTE ADULT - SUBJECTIVE AND OBJECTIVE BOX
Patient is a 38y old  Male who presents with a chief complaint of     HPI: 38-year-old male with history of hypertension gastric sleeve presents with jaundice and confusion. Pt was given Ativan for agitation. CIWA >30. Per ED, Pt initially denies EtOH use but family confirmed pt is heavy EtOH abuser. Found severe elevated LFTs and coagulation disorder. MICU was consulted for eval.    PAST MEDICAL & SURGICAL HISTORY:  Hypertension, unspecified type      H/O gastric sleeve      Medications:      ICU Vital Signs Last 24 Hrs  T(C): 37 (29 May 2024 09:35), Max: 37 (29 May 2024 09:35)  T(F): 98.6 (29 May 2024 09:35), Max: 98.6 (29 May 2024 09:35)  HR: 104 (29 May 2024 12:40) (104 - 118)  BP: 153/87 (29 May 2024 12:40) (135/82 - 153/87)  BP(mean): 105 (29 May 2024 12:40) (105 - 106)  ABP: --  ABP(mean): --  RR: 26 (29 May 2024 12:40) (20 - 26)  SpO2: 100% (29 May 2024 12:40) (100% - 100%)    O2 Parameters below as of 29 May 2024 12:40  Patient On (Oxygen Delivery Method): room air      LABS:                        15.6   9.19  )-----------( 116      ( 29 May 2024 10:07 )             45.2     05-    129<L>  |  86<L>  |  41.5<H>  ----------------------------<  119<H>  4.6   |  24.0  |  1.72<H>    Ca    9.3      29 May 2024 10:07  Mg     1.8     05-    TPro  6.3<L>  /  Alb  3.6  /  TBili  10.6<H>  /  DBili  x   /  AST  4845<H>  /  ALT  4619<H>  /  AlkPhos  337<H>        CARDIAC MARKERS ( 29 May 2024 10:07 )  x     / x     / 314 U/L / x     / 11.8 ng/mL      CAPILLARY BLOOD GLUCOSE        PT/INR - ( 29 May 2024 10:07 )   PT: 40.8 sec;   INR: 3.82 ratio         PTT - ( 29 May 2024 10:07 )  PTT:37.9 sec  Urinalysis Basic - ( 29 May 2024 10:33 )    Color: Dark Yellow / Appearance: Cloudy / S.022 / pH: x  Gluc: x / Ketone: Trace mg/dL  / Bili: Large / Urobili: 1.0 mg/dL   Blood: x / Protein: 30 mg/dL / Nitrite: Positive   Leuk Esterase: Small / RBC: 9 /HPF / WBC 1 /HPF   Sq Epi: x / Non Sq Epi: 34 /HPF / Bacteria: Occasional /HPF      CULTURES:      Physical Examination:    General: poor response, ill appearing     HEENT: +b/l jaundice,  Pupils equal, reactive to light.  Symmetric.    PULM: Clear to auscultation bilaterally, no significant sputum production    NECK: Supple, no lymphadenopathy, trachea midline    CVS: +Tachycardic, Regular rate and rhythm, no murmurs, rubs, or gallops    ABD: Soft, nondistended, nontender, normoactive bowel sounds, no masses    EXT: No edema, nontender    SKIN: + jaundice, Warm and well perfused, no rashes noted.    NEURO: ataxic, nonfocal    DEVICES:     RADIOLOGY: ***    CRITICAL CARE TIME SPENT: ***

## 2024-05-29 NOTE — ED ADULT NURSE REASSESSMENT NOTE - NS ED NURSE REASSESS COMMENT FT1
Report received from [facility] MANSOOR Souza. WA assessment and repeat vitals to be completed.

## 2024-05-29 NOTE — ED PROVIDER NOTE - PROGRESS NOTE DETAILS
transplant attending evaluated pt in ED with [facility], request admission to SICU Dr. Samir Amaya. -Reese Villanueva PA-C

## 2024-05-29 NOTE — CONSULT NOTE ADULT - ATTENDING COMMENTS
38M with hx of HTN, gastric sleeve, EtOH use disorder presented with AMS and jaundice found to be in acute liver failure, lactic acidosis and elevated ammonia level and noted with acute agitation requiring sedatives. MICU consulted for evaluation but given acute liver failure recommended evaluation for transfer to transplant capable center and discussion with GI as well. ED contacted [facility] to review the case and pt was accepted to their hospital for further care. Pt already transferred out of hospital prior to my evaluation.

## 2024-05-29 NOTE — CONSULT NOTE ADULT - SUBJECTIVE AND OBJECTIVE BOX
HPI: 38M PMH HTN, ETOH use (daily drink) PSH gastric sleeve, initially admitted to [facility] for jaundice and confusion, found to have elevated transaminases to 4000s with high MELD and withdrawal symptoms, transferred to [facility] for liver transplant workup.     Majority of history obtained from chart as patient is encephalopathic and unable to provide reliable history.     Per chart, patient was brought to hospital by father after found at the roof of the house hallucinating. Patient had recently also been started on tetracycline for sore throat and reports that he became jaundiced afterwards. Patient reports no alcohol use disorder and that last drink was 6 days prior to admission, but per family, patient is heavy alcohol user. This is first hospitalization for alcohol decompensation.     Allergies:  No Known Allergies      Home Medications:    Hospital Medications:  acetylcysteine IVPB 4 Gram(s) IV Intermittent once  cefTRIAXone   IVPB 2000 milliGRAM(s) IV Intermittent every 24 hours  chlorhexidine 2% Cloths 1 Application(s) Topical <User Schedule>  heparin   Injectable 5000 Unit(s) SubCutaneous every 12 hours  lactulose Syrup 10 Gram(s) Oral every 4 hours  LORazepam   Injectable 2 milliGRAM(s) IV Push every 1 hour PRN  pantoprazole  Injectable 40 milliGRAM(s) IV Push every 24 hours      PMHX/PSHX:  No pertinent past medical history    Hypertension, unspecified type    No significant past surgical history    H/O gastric sleeve        Family history:      Denies family history of colon cancer/polyps, stomach cancer/polyps, pancreatic cancer/masses, liver cancer/disease, ovarian cancer and endometrial cancer.    Social History:   Tob: Denies  EtOH: Denies  Illicit Drugs: Denies    ROS:   Unable to assess given mental status     PHYSICAL EXAM:     GENERAL: Patient appears confused  HEENT:  NCAT, + scleral icterus  CHEST: no resp distress  HEART:  RRR  ABDOMEN:  Soft, non-tender, non-distended, no masses  EXTREMITIES:  No cyanosis, clubbing, or edema  SKIN:  No rash/erythema/ecchymoses/petechiae/wounds/abscess/warm/dry  NEURO:  Alert and oriented x 1    Vital Signs:  Vital Signs Last 24 Hrs  T(C): 36.7 (29 May 2024 15:35), Max: 37.3 (29 May 2024 13:43)  T(F): 98 (29 May 2024 15:35), Max: 99.1 (29 May 2024 13:43)  HR: 112 (29 May 2024 16:00) (104 - 118)  BP: 135/84 (29 May 2024 16:00) (118/81 - 153/87)  BP(mean): 104 (29 May 2024 16:00) (87 - 108)  RR: 22 (29 May 2024 16:00) (20 - 26)  SpO2: 99% (29 May 2024 16:00) (96% - 100%)    Parameters below as of 29 May 2024 15:35  Patient On (Oxygen Delivery Method): room air      Daily Height in cm: 185.42 (29 May 2024 15:35)    Daily     LABS:                        13.7   9.10  )-----------( 101      ( 29 May 2024 13:56 )             40.5     Mean Cell Volume: 95.5 fl (05-29-24 @ 13:56)    05-29    131<L>  |  97  |  35<H>  ----------------------------<  111<H>  4.8   |  22  |  1.40<H>    Ca    8.5      29 May 2024 13:56  Mg     1.8     05-29    TPro  5.9<L>  /  Alb  3.1<L>  /  TBili  10.0<H>  /  DBili  x   /  AST  3428<H>  /  ALT  3746<H>  /  AlkPhos  297<H>  05-29    LIVER FUNCTIONS - ( 29 May 2024 13:56 )  Alb: 3.1 g/dL / Pro: 5.9 g/dL / ALK PHOS: 297 U/L / ALT: 3746 U/L / AST: 3428 U/L / GGT: x           PT/INR - ( 29 May 2024 13:56 )   PT: 39.2 sec;   INR: 3.70 ratio         PTT - ( 29 May 2024 13:56 )  PTT:39.0 sec  Urinalysis Basic - ( 29 May 2024 13:56 )    Color: x / Appearance: x / SG: x / pH: x  Gluc: 111 mg/dL / Ketone: x  / Bili: x / Urobili: x   Blood: x / Protein: x / Nitrite: x   Leuk Esterase: x / RBC: x / WBC x   Sq Epi: x / Non Sq Epi: x / Bacteria: x      Amylase Serum--      Lipase serum--       [id number]  Amylase Serum--      Lipase serum27       [id number]                          13.7   9.10  )-----------( 101      ( 29 May 2024 13:56 )             40.5                         15.6   9.19  )-----------( 116      ( 29 May 2024 10:07 )             45.2       Imaging:

## 2024-05-29 NOTE — ED ADULT NURSE NOTE - NSFALLRISKINTERV_ED_ALL_ED
Assistance OOB with selected safe patient handling equipment if applicable/Assistance with ambulation/Communicate fall risk and risk factors to all staff, patient, and family/Monitor gait and stability/Monitor for mental status changes and reorient to person, place, and time, as needed/Provide visual cue: yellow wristband, yellow gown, etc/Reinforce activity limits and safety measures with patient and family/Toileting schedule using arm’s reach rule for commode and bathroom/Use of alarms - bed, stretcher, chair and/or video monitoring/Call bell, personal items and telephone in reach/Instruct patient to call for assistance before getting out of bed/chair/stretcher/Non-slip footwear applied when patient is off stretcher/[place] to call system/Physically safe environment - no spills, clutter or unnecessary equipment/Purposeful Proactive Rounding/Room/bathroom lighting operational, light cord in reach

## 2024-05-30 ENCOUNTER — RESULT REVIEW (OUTPATIENT)
Age: 39
End: 2024-05-30

## 2024-05-30 LAB
A1C WITH ESTIMATED AVERAGE GLUCOSE RESULT: 5.3 % — SIGNIFICANT CHANGE UP (ref 4–5.6)
AFP-TM SERPL-MCNC: 6 NG/ML — SIGNIFICANT CHANGE UP
ALBUMIN SERPL ELPH-MCNC: 2.8 G/DL — LOW (ref 3.3–5)
ALBUMIN SERPL ELPH-MCNC: 3 G/DL — LOW (ref 3.3–5)
ALP SERPL-CCNC: 270 U/L — HIGH (ref 40–120)
ALP SERPL-CCNC: 292 U/L — HIGH (ref 40–120)
ALT FLD-CCNC: 2235 U/L — HIGH (ref 10–45)
ALT FLD-CCNC: 2623 U/L — HIGH (ref 10–45)
ANION GAP SERPL CALC-SCNC: 13 MMOL/L — SIGNIFICANT CHANGE UP (ref 5–17)
ANION GAP SERPL CALC-SCNC: 14 MMOL/L — SIGNIFICANT CHANGE UP (ref 5–17)
APTT BLD: 36.5 SEC — HIGH (ref 24.5–35.6)
AST SERPL-CCNC: 1155 U/L — HIGH (ref 10–40)
AST SERPL-CCNC: 775 U/L — HIGH (ref 10–40)
BASE EXCESS BLDV CALC-SCNC: 4 MMOL/L — HIGH (ref -2–3)
BILIRUB SERPL-MCNC: 10.6 MG/DL — HIGH (ref 0.2–1.2)
BILIRUB SERPL-MCNC: 9.8 MG/DL — HIGH (ref 0.2–1.2)
BUN SERPL-MCNC: 17 MG/DL — SIGNIFICANT CHANGE UP (ref 7–23)
BUN SERPL-MCNC: 21 MG/DL — SIGNIFICANT CHANGE UP (ref 7–23)
CA-I SERPL-SCNC: 1.14 MMOL/L — LOW (ref 1.15–1.33)
CALCIUM SERPL-MCNC: 8.3 MG/DL — LOW (ref 8.4–10.5)
CALCIUM SERPL-MCNC: 8.9 MG/DL — SIGNIFICANT CHANGE UP (ref 8.4–10.5)
CERULOPLASMIN SERPL-MCNC: 12 MG/DL — LOW (ref 15–30)
CHLORIDE BLDV-SCNC: 100 MMOL/L — SIGNIFICANT CHANGE UP (ref 96–108)
CHLORIDE SERPL-SCNC: 100 MMOL/L — SIGNIFICANT CHANGE UP (ref 96–108)
CHLORIDE SERPL-SCNC: 103 MMOL/L — SIGNIFICANT CHANGE UP (ref 96–108)
CHOLEST SERPL-MCNC: 83 MG/DL — SIGNIFICANT CHANGE UP
CMV DNA CSF QL NAA+PROBE: SIGNIFICANT CHANGE UP IU/ML
CMV DNA SPEC NAA+PROBE-LOG#: SIGNIFICANT CHANGE UP LOG10IU/ML
CMV IGG FLD QL: 4.5 U/ML — HIGH
CMV IGG SERPL-IMP: POSITIVE
CO2 BLDV-SCNC: 29 MMOL/L — HIGH (ref 22–26)
CO2 SERPL-SCNC: 20 MMOL/L — LOW (ref 22–31)
CO2 SERPL-SCNC: 24 MMOL/L — SIGNIFICANT CHANGE UP (ref 22–31)
COVID-19 SPIKE DOMAIN AB INTERP: POSITIVE
COVID-19 SPIKE DOMAIN ANTIBODY RESULT: >250 U/ML — HIGH
CREAT SERPL-MCNC: 0.76 MG/DL — SIGNIFICANT CHANGE UP (ref 0.5–1.3)
CREAT SERPL-MCNC: 0.88 MG/DL — SIGNIFICANT CHANGE UP (ref 0.5–1.3)
CULTURE RESULTS: SIGNIFICANT CHANGE UP
EBV EA AB SER IA-ACNC: <5 U/ML — SIGNIFICANT CHANGE UP
EBV EA AB TITR SER IF: POSITIVE
EBV EA IGG SER-ACNC: NEGATIVE — SIGNIFICANT CHANGE UP
EBV NA IGG SER IA-ACNC: 53.5 U/ML — HIGH
EBV PATRN SPEC IB-IMP: SIGNIFICANT CHANGE UP
EBV VCA IGG AVIDITY SER QL IA: POSITIVE
EBV VCA IGM SER IA-ACNC: 402 U/ML — HIGH
EBV VCA IGM SER IA-ACNC: <10 U/ML — SIGNIFICANT CHANGE UP
EBV VCA IGM TITR FLD: NEGATIVE — SIGNIFICANT CHANGE UP
EGFR: 114 ML/MIN/1.73M2 — SIGNIFICANT CHANGE UP
EGFR: 119 ML/MIN/1.73M2 — SIGNIFICANT CHANGE UP
ESTIMATED AVERAGE GLUCOSE: 105 MG/DL — SIGNIFICANT CHANGE UP (ref 68–114)
GAS PNL BLDV: 133 MMOL/L — LOW (ref 136–145)
GAS PNL BLDV: SIGNIFICANT CHANGE UP
GLUCOSE BLDV-MCNC: 177 MG/DL — HIGH (ref 70–99)
GLUCOSE SERPL-MCNC: 126 MG/DL — HIGH (ref 70–99)
GLUCOSE SERPL-MCNC: 177 MG/DL — HIGH (ref 70–99)
HAV IGG SER QL IA: REACTIVE
HAV IGM SER-ACNC: SIGNIFICANT CHANGE UP
HAV IGM SER-ACNC: SIGNIFICANT CHANGE UP
HBV CORE IGM SER-ACNC: SIGNIFICANT CHANGE UP
HBV DNA # SERPL NAA+PROBE: SIGNIFICANT CHANGE UP
HBV DNA SERPL NAA+PROBE-LOG#: SIGNIFICANT CHANGE UP LOGIU/ML
HBV SURFACE AB SER-ACNC: <3 MIU/ML — LOW
HBV SURFACE AB SER-ACNC: SIGNIFICANT CHANGE UP
HBV SURFACE AG SER-ACNC: SIGNIFICANT CHANGE UP
HCO3 BLDV-SCNC: 28 MMOL/L — SIGNIFICANT CHANGE UP (ref 22–29)
HCT VFR BLD CALC: 35.3 % — LOW (ref 39–50)
HCT VFR BLDA CALC: 38 % — LOW (ref 39–51)
HCV AB S/CO SERPL IA: 0.09 S/CO — SIGNIFICANT CHANGE UP (ref 0–0.99)
HCV AB S/CO SERPL IA: 0.1 S/CO — SIGNIFICANT CHANGE UP (ref 0–0.99)
HCV AB SERPL-IMP: SIGNIFICANT CHANGE UP
HCV AB SERPL-IMP: SIGNIFICANT CHANGE UP
HCV RNA FLD QL NAA+PROBE: SIGNIFICANT CHANGE UP
HCV RNA SPEC QL PROBE+SIG AMP: SIGNIFICANT CHANGE UP
HDLC SERPL-MCNC: 16 MG/DL — LOW
HGB BLD CALC-MCNC: 12.8 G/DL — SIGNIFICANT CHANGE UP (ref 12.6–17.4)
HGB BLD-MCNC: 12.3 G/DL — LOW (ref 13–17)
HSV1 IGG SER-ACNC: 36.1 INDEX — HIGH
HSV1 IGG SERPL QL IA: POSITIVE
HSV2 IGG FLD-ACNC: 0.95 INDEX — HIGH
HSV2 IGG SERPL QL IA: ABNORMAL
IGA FLD-MCNC: 502 MG/DL — HIGH (ref 84–499)
IGG FLD-MCNC: 1022 MG/DL — SIGNIFICANT CHANGE UP (ref 610–1660)
IGM SERPL-MCNC: 122 MG/DL — SIGNIFICANT CHANGE UP (ref 35–242)
INR BLD: 3.23 RATIO — HIGH (ref 0.85–1.18)
IRON SATN MFR SERPL: 200 UG/DL — HIGH (ref 45–165)
IRON SATN MFR SERPL: SIGNIFICANT CHANGE UP % (ref 16–55)
LACTATE BLDV-MCNC: 1.7 MMOL/L — SIGNIFICANT CHANGE UP (ref 0.5–2)
LIPID PNL WITH DIRECT LDL SERPL: 46 MG/DL — SIGNIFICANT CHANGE UP
MAGNESIUM SERPL-MCNC: 2.1 MG/DL — SIGNIFICANT CHANGE UP (ref 1.6–2.6)
MAGNESIUM SERPL-MCNC: 2.3 MG/DL — SIGNIFICANT CHANGE UP (ref 1.6–2.6)
MCHC RBC-ENTMCNC: 32.1 PG — SIGNIFICANT CHANGE UP (ref 27–34)
MCHC RBC-ENTMCNC: 34.8 GM/DL — SIGNIFICANT CHANGE UP (ref 32–36)
MCV RBC AUTO: 92.2 FL — SIGNIFICANT CHANGE UP (ref 80–100)
MEV IGG SER-ACNC: <5 AU/ML — SIGNIFICANT CHANGE UP
MEV IGG+IGM SER-IMP: NEGATIVE — SIGNIFICANT CHANGE UP
MITOCHONDRIA AB SER-ACNC: SIGNIFICANT CHANGE UP
MRSA PCR RESULT.: SIGNIFICANT CHANGE UP
MUV AB SER-ACNC: NEGATIVE — SIGNIFICANT CHANGE UP
MUV IGG FLD-ACNC: <5 AU/ML — SIGNIFICANT CHANGE UP
NON HDL CHOLESTEROL: 67 MG/DL — SIGNIFICANT CHANGE UP
NRBC # BLD: 0 /100 WBCS — SIGNIFICANT CHANGE UP (ref 0–0)
OTHER CELLS CSF MANUAL: 17.3 ML/DL — LOW (ref 18–22)
PCO2 BLDV: 37 MMHG — LOW (ref 42–55)
PH BLDV: 7.48 — HIGH (ref 7.32–7.43)
PHOSPHATE SERPL-MCNC: 0.6 MG/DL — CRITICAL LOW (ref 2.5–4.5)
PHOSPHATE SERPL-MCNC: 2.2 MG/DL — LOW (ref 2.5–4.5)
PLATELET # BLD AUTO: 116 K/UL — LOW (ref 150–400)
PO2 BLDV: 75 MMHG — HIGH (ref 25–45)
POTASSIUM BLDV-SCNC: 3 MMOL/L — LOW (ref 3.5–5.1)
POTASSIUM SERPL-MCNC: 3.1 MMOL/L — LOW (ref 3.5–5.3)
POTASSIUM SERPL-MCNC: 3.8 MMOL/L — SIGNIFICANT CHANGE UP (ref 3.5–5.3)
POTASSIUM SERPL-SCNC: 3.1 MMOL/L — LOW (ref 3.5–5.3)
POTASSIUM SERPL-SCNC: 3.8 MMOL/L — SIGNIFICANT CHANGE UP (ref 3.5–5.3)
PROT SERPL-MCNC: 5.1 G/DL — LOW (ref 6–8.3)
PROT SERPL-MCNC: 5.8 G/DL — LOW (ref 6–8.3)
PROTHROM AB SERPL-ACNC: 34.3 SEC — HIGH (ref 9.5–13)
PSA SERPL-MCNC: 0.56 NG/ML — SIGNIFICANT CHANGE UP (ref 0–4)
RBC # BLD: 3.83 M/UL — LOW (ref 4.2–5.8)
RBC # FLD: 13.2 % — SIGNIFICANT CHANGE UP (ref 10.3–14.5)
RUBV IGG SER-ACNC: 1 INDEX — SIGNIFICANT CHANGE UP
RUBV IGG SER-IMP: SIGNIFICANT CHANGE UP
S AUREUS DNA NOSE QL NAA+PROBE: SIGNIFICANT CHANGE UP
SAO2 % BLDV: 97.7 % — HIGH (ref 67–88)
SARS-COV-2 IGG+IGM SERPL QL IA: >250 U/ML — HIGH
SARS-COV-2 IGG+IGM SERPL QL IA: POSITIVE
SMOOTH MUSCLE AB SER-ACNC: SIGNIFICANT CHANGE UP
SODIUM SERPL-SCNC: 137 MMOL/L — SIGNIFICANT CHANGE UP (ref 135–145)
SODIUM SERPL-SCNC: 137 MMOL/L — SIGNIFICANT CHANGE UP (ref 135–145)
SPECIMEN SOURCE: SIGNIFICANT CHANGE UP
T GONDII IGG SER QL: 48.5 IU/ML — HIGH
T GONDII IGG SER QL: POSITIVE
T4 FREE SERPL-MCNC: 1.5 NG/DL — SIGNIFICANT CHANGE UP (ref 0.9–1.8)
TIBC SERPL-MCNC: SIGNIFICANT CHANGE UP UG/DL (ref 220–430)
TRIGL SERPL-MCNC: 110 MG/DL — SIGNIFICANT CHANGE UP
TSH SERPL-MCNC: 0.05 UIU/ML — LOW (ref 0.27–4.2)
UIBC SERPL-MCNC: <20 UG/DL — LOW (ref 110–370)
VZV IGG FLD QL IA: 1468 INDEX — SIGNIFICANT CHANGE UP
VZV IGG FLD QL IA: POSITIVE — SIGNIFICANT CHANGE UP
WBC # BLD: 7.86 K/UL — SIGNIFICANT CHANGE UP (ref 3.8–10.5)
WBC # FLD AUTO: 7.86 K/UL — SIGNIFICANT CHANGE UP (ref 3.8–10.5)

## 2024-05-30 PROCEDURE — 99223 1ST HOSP IP/OBS HIGH 75: CPT

## 2024-05-30 PROCEDURE — 99233 SBSQ HOSP IP/OBS HIGH 50: CPT

## 2024-05-30 PROCEDURE — 93306 TTE W/DOPPLER COMPLETE: CPT | Mod: 26

## 2024-05-30 PROCEDURE — 99232 SBSQ HOSP IP/OBS MODERATE 35: CPT

## 2024-05-30 RX ORDER — LACTULOSE 10 G/15ML
20 SOLUTION ORAL EVERY 4 HOURS
Refills: 0 | Status: DISCONTINUED | OUTPATIENT
Start: 2024-05-30 | End: 2024-06-05

## 2024-05-30 RX ORDER — ALBUMIN HUMAN 25 %
50 VIAL (ML) INTRAVENOUS EVERY 8 HOURS
Refills: 0 | Status: COMPLETED | OUTPATIENT
Start: 2024-05-30 | End: 2024-06-01

## 2024-05-30 RX ORDER — ACETYLCYSTEINE 200 MG/ML
8 VIAL (ML) MISCELLANEOUS ONCE
Refills: 0 | Status: COMPLETED | OUTPATIENT
Start: 2024-05-30 | End: 2024-05-30

## 2024-05-30 RX ORDER — POTASSIUM PHOSPHATE, MONOBASIC POTASSIUM PHOSPHATE, DIBASIC 236; 224 MG/ML; MG/ML
30 INJECTION, SOLUTION INTRAVENOUS ONCE
Refills: 0 | Status: COMPLETED | OUTPATIENT
Start: 2024-05-30 | End: 2024-05-30

## 2024-05-30 RX ORDER — POTASSIUM CHLORIDE 20 MEQ
10 PACKET (EA) ORAL
Refills: 0 | Status: COMPLETED | OUTPATIENT
Start: 2024-05-30 | End: 2024-05-30

## 2024-05-30 RX ADMIN — LACTULOSE 20 GRAM(S): 10 SOLUTION ORAL at 16:42

## 2024-05-30 RX ADMIN — Medication 100 MILLIEQUIVALENT(S): at 15:33

## 2024-05-30 RX ADMIN — Medication 255 MILLIMOLE(S): at 08:55

## 2024-05-30 RX ADMIN — Medication 2 MILLIGRAM(S): at 23:35

## 2024-05-30 RX ADMIN — Medication 2 MILLIGRAM(S): at 06:31

## 2024-05-30 RX ADMIN — LACTULOSE 10 GRAM(S): 10 SOLUTION ORAL at 05:05

## 2024-05-30 RX ADMIN — Medication 50 MILLILITER(S): at 13:37

## 2024-05-30 RX ADMIN — CHLORHEXIDINE GLUCONATE 1 APPLICATION(S): 213 SOLUTION TOPICAL at 05:06

## 2024-05-30 RX ADMIN — LACTULOSE 20 GRAM(S): 10 SOLUTION ORAL at 12:36

## 2024-05-30 RX ADMIN — Medication 100 GRAM(S): at 00:02

## 2024-05-30 RX ADMIN — Medication 65 GRAM(S): at 13:37

## 2024-05-30 RX ADMIN — HEPARIN SODIUM 5000 UNIT(S): 5000 INJECTION INTRAVENOUS; SUBCUTANEOUS at 17:54

## 2024-05-30 RX ADMIN — Medication 2 MILLIGRAM(S): at 05:22

## 2024-05-30 RX ADMIN — PANTOPRAZOLE SODIUM 40 MILLIGRAM(S): 20 TABLET, DELAYED RELEASE ORAL at 17:54

## 2024-05-30 RX ADMIN — CEFTRIAXONE 100 MILLIGRAM(S): 500 INJECTION, POWDER, FOR SOLUTION INTRAMUSCULAR; INTRAVENOUS at 17:53

## 2024-05-30 RX ADMIN — Medication 255 MILLIMOLE(S): at 05:05

## 2024-05-30 RX ADMIN — LACTULOSE 10 GRAM(S): 10 SOLUTION ORAL at 02:34

## 2024-05-30 RX ADMIN — Medication 100 MILLIEQUIVALENT(S): at 18:35

## 2024-05-30 RX ADMIN — Medication 2 MILLIGRAM(S): at 21:33

## 2024-05-30 RX ADMIN — LACTULOSE 20 GRAM(S): 10 SOLUTION ORAL at 21:33

## 2024-05-30 RX ADMIN — POTASSIUM PHOSPHATE, MONOBASIC POTASSIUM PHOSPHATE, DIBASIC 62.5 MILLIMOLE(S): 236; 224 INJECTION, SOLUTION INTRAVENOUS at 00:55

## 2024-05-30 RX ADMIN — HEPARIN SODIUM 5000 UNIT(S): 5000 INJECTION INTRAVENOUS; SUBCUTANEOUS at 05:06

## 2024-05-30 RX ADMIN — Medication 50 MILLILITER(S): at 21:32

## 2024-05-30 RX ADMIN — POTASSIUM PHOSPHATE, MONOBASIC POTASSIUM PHOSPHATE, DIBASIC 83.33 MILLIMOLE(S): 236; 224 INJECTION, SOLUTION INTRAVENOUS at 21:33

## 2024-05-30 RX ADMIN — Medication 100 MILLIEQUIVALENT(S): at 16:43

## 2024-05-30 NOTE — OCCUPATIONAL THERAPY INITIAL EVALUATION ADULT - LIVES WITH, PROFILE
As per jimmy, pt lives with family in private home, 3 +4 steps to enter, 1st floor setup, walk in shower. Pt I in ADLs and ambulation prior to admission

## 2024-05-30 NOTE — PHYSICAL THERAPY INITIAL EVALUATION ADULT - PLANNED THERAPY INTERVENTIONS, PT EVAL
stair neg: GOAL: pt will neg 4 steps 1 HR ind in 4wks./bed mobility training/gait training/strengthening/transfer training

## 2024-05-30 NOTE — PHYSICAL THERAPY INITIAL EVALUATION ADULT - ADDITIONAL COMMENTS
Per jimmy at bedside, pt resides at home with fiance and daughter, +3+4 steps to enter, +1st floor setup, PTA ind amb and ADLs,+driving, +WFH, +LH,

## 2024-05-30 NOTE — PROGRESS NOTE ADULT - SUBJECTIVE AND OBJECTIVE BOX
Gastroenterology/Hepatology Progress Note    Interval Events:   - no acute ON events   - patient is very somnolent likely from ativan and liver disease     Allergies:  No Known Allergies      Hospital Medications:  albumin human 25% IVPB 50 milliLiter(s) IV Intermittent every 8 hours  cefTRIAXone   IVPB 2000 milliGRAM(s) IV Intermittent every 24 hours  chlorhexidine 2% Cloths 1 Application(s) Topical <User Schedule>  heparin   Injectable 5000 Unit(s) SubCutaneous every 12 hours  lactulose Syrup 20 Gram(s) Oral every 4 hours  LORazepam   Injectable 2 milliGRAM(s) IV Push every 1 hour PRN  pantoprazole  Injectable 40 milliGRAM(s) IV Push every 24 hours  potassium chloride  10 mEq/100 mL IVPB 10 milliEquivalent(s) IV Intermittent every 1 hour  potassium phosphate IVPB 30 milliMole(s) IV Intermittent once      ROS: 14 point ROS unable to assess given mental status     PHYSICAL EXAM:   Vital Signs:  Vital Signs Last 24 Hrs  T(C): 36.9 (30 May 2024 15:00), Max: 37.2 (30 May 2024 07:00)  T(F): 98.4 (30 May 2024 15:00), Max: 99 (30 May 2024 07:00)  HR: 107 (30 May 2024 15:00) (102 - 135)  BP: 144/85 (30 May 2024 15:00) (122/66 - 159/83)  BP(mean): 108 (30 May 2024 15:00) (89 - 117)  RR: 25 (30 May 2024 15:00) (19 - 33)  SpO2: 98% (30 May 2024 15:00) (89% - 99%)    Parameters below as of 30 May 2024 14:18  Patient On (Oxygen Delivery Method): nasal cannula      Daily     Daily     GENERAL: Patient somnolent   HEENT:  NCAT, + scleral icterus  CHEST: no resp distress  HEART:  RRR  ABDOMEN:  Soft, non-tender, non-distended, no masses  EXTREMITIES:  No cyanosis, clubbing, or edema  SKIN:  No rash/erythema/ecchymoses/petechiae/wounds/abscess/warm/dry  NEURO:  Alert and oriented x 0    LABS:                        12.3   7.86  )-----------( 116      ( 30 May 2024 11:48 )             35.3     Mean Cell Volume: 92.2 fl (05-30-24 @ 11:48)    05-30    137  |  100  |  17  ----------------------------<  177<H>  3.1<L>   |  24  |  0.76    Ca    8.3<L>      30 May 2024 11:48  Phos  2.2     05-30  Mg     2.1     05-30    TPro  5.1<L>  /  Alb  2.8<L>  /  TBili  9.8<H>  /  DBili  x   /  AST  775<H>  /  ALT  2235<H>  /  AlkPhos  270<H>  05-30    LIVER FUNCTIONS - ( 30 May 2024 11:48 )  Alb: 2.8 g/dL / Pro: 5.1 g/dL / ALK PHOS: 270 U/L / ALT: 2235 U/L / AST: 775 U/L / GGT: x           PT/INR - ( 29 May 2024 23:00 )   PT: 34.3 sec;   INR: 3.23 ratio         PTT - ( 29 May 2024 23:00 )  PTT:36.5 sec  Urinalysis Basic - ( 30 May 2024 11:48 )    Color: x / Appearance: x / SG: x / pH: x  Gluc: 177 mg/dL / Ketone: x  / Bili: x / Urobili: x   Blood: x / Protein: x / Nitrite: x   Leuk Esterase: x / RBC: x / WBC x   Sq Epi: x / Non Sq Epi: x / Bacteria: x      Amylase Serum--      Lipase serum--       [id number]  Amylase Serum--      Lipase serum--       [id number]        Imaging:

## 2024-05-30 NOTE — PROGRESS NOTE ADULT - SUBJECTIVE AND OBJECTIVE BOX
This patient is a 38 year old male being evaluated for a liver transplant. PMH: ETOH use, HTN    No Known Drug Allergies    Home Meds:  None    MEDICATIONS  (STANDING):  albumin human 25% IVPB 50 milliLiter(s) IV Intermittent every 8 hours  cefTRIAXone   IVPB 2000 milliGRAM(s) IV Intermittent every 24 hours  chlorhexidine 2% Cloths 1 Application(s) Topical <User Schedule>  heparin   Injectable 5000 Unit(s) SubCutaneous every 12 hours  lactulose Syrup 20 Gram(s) Oral every 4 hours  pantoprazole  Injectable 40 milliGRAM(s) IV Push every 24 hours  potassium chloride  10 mEq/100 mL IVPB 10 milliEquivalent(s) IV Intermittent every 1 hour  potassium phosphate IVPB 30 milliMole(s) IV Intermittent once    MEDICATIONS  (PRN):  LORazepam   Injectable 2 milliGRAM(s) IV Push every 1 hour PRN CIWA-Ar score 8 or greater    The patient has no pharmacologic contraindications to transplant and is cleared by pharmacy.

## 2024-05-30 NOTE — CONSULT NOTE ADULT - ASSESSMENT
38 year-old, current smoker, presents with EtOH hepatitis and withdrawal.  Patient is currently encephalopathic and tremulous.  ECG and TTE reviewed.    In light of active smoking history (two packs per day), recommend beta-blockade and planning for CTCA to evaluate coronary arteries.

## 2024-05-30 NOTE — PROGRESS NOTE ADULT - ASSESSMENT
ASSESSMENT  38M PMH HTN, ETOH use (daily drink) PSH gastric sleeve, admitted for acute liver failure and Liver transplant eval.     PLAN    NEURO  - Pain: None  - Sedation: None  - Lorazepam PRN for CIWA  - Trend ammonia  - Lactulose for hepatic encephalopathy     RESP  - Maintain O2 saturation >92%  - AM CXRs    CV  - MAP goal >65  - Pressors: None  - 5/29 TTE ordered    GI/NUTRITION  - Diet: Reg  - Protonix IV daily  - Lactulose  - Acetylcysteine gtt     RENAL/  - Voiding    HEME  - DVT ppx: SQH    ID  - ABX: CTX (5/29-)  - 5/29: BCx - results pending  - 5/29: +UA, UCx pending    ENDO  - Monitor blood glucose     LINES  - PIVs    CODE: FULL  DISPO: SICU

## 2024-05-30 NOTE — PHYSICAL THERAPY INITIAL EVALUATION ADULT - PERTINENT HX OF CURRENT PROBLEM, REHAB EVAL
Pt is a 38M admitted to [facility] on 5/29/24 PMH HTN, ETOH use (daily drink) PSH gastric sleeve, initially admitted to [facility] for jaundice and confusion, found to have elevated transaminases to 4000s with high MELD and withdrawal symptoms, transferred to [facility] for liver transplant workup.  Majority of history obtained from chart as patient is encephalopathic and unable to provide reliable history.  Per chart, patient was brought to hospital by father after found at the roof of the house hallucinating. Pt had recently also been started on tetracycline for sore throat and reports that he became jaundiced afterwards. Pt reports no alcohol use disorder and that last drink was 6 days prior to admission, but per family, pt is heavy alcohol user. This is first hospitalization for alcohol decompensation. Hospital course: 5/29: transfer from [facility] for elevated LFTs, AMS; c/f acute etoh hepatitis; starting CIWA, nac drip, cultures sent; empiric CTX; lactate 6 -> 1.6; 5/30 MELD 28, AMS, incr lact 20g, peth;  CT chest/ A/P with diffuse, marked hepatic steatosis, CT head with no acute intracranial hemorrhage, mass effect or midline shift. At [facility], afebrile, , BP stable. Labs notable for INR 3.7, AST 3428, ALT 3746, tbili 10, Cr 1.4, Ammonia 99, lactate 3.0. Imaging only notable for hepatostatosis, thickened GB. Pt is a 38M admitted to [facility] on 5/29/24 PMH HTN, ETOH use (daily drink) PSH gastric sleeve, initially admitted to [facility] for jaundice and confusion, found to have elevated transaminases to 4000s with high MELD and withdrawal symptoms, transferred to [facility] for liver transplant workup.  Majority of history obtained from chart as patient is encephalopathic and unable to provide reliable history.  Per chart, patient was brought to hospital by father after found at the roof of the house hallucinating. Pt had recently also been started on tetracycline for sore throat and reports that he became jaundiced afterwards. Pt reports no alcohol use disorder and that last drink was 6 days prior to admission, but per family, pt is heavy alcohol user. This is first hospitalization for alcohol decompensation. Hospital course: 5/29: transfer from [facility] for elevated LFTs, AMS; c/f acute etoh hepatitis; starting CIWA, nac drip, cultures sent; empiric CTX; lactate 6 -> 1.6; 5/30 MELD 28, AMS, incr lact 20g, peth;  CT chest/ A/P with diffuse, marked hepatic steatosis, CT head with no acute intracranial hemorrhage, mass effect or midline shift. At [facility], afebrile, , BP stable. Labs notable for INR 3.7, AST 3428, ALT 3746, tbili 10, Cr 1.4, Ammonia 99, lactate 3.0. Imaging only notable for hepatostatosis, thickened GB. CT chest: Diffuse, marked hepatic steatosis which was not seen on 11/27/2017. Question of mild gallbladder wall thickening. If patient has right upper quadrant pain, right upper quadrant ultrasound would be recommended. No focal lung consolidation. Question of mild cardiomegaly. Please correlate with PA and lateral chest x-ray. CT neck: Enlarged palatine tonsils without hyperemia or surrounding infiltrative changes. No lymphadenopathy. No soft tissue abscess. No periapical lucencies. Salivary glands unremarkable. CTH: No acute intracranial hemorrhage, mass effect or midline shift.

## 2024-05-30 NOTE — PROGRESS NOTE ADULT - SUBJECTIVE AND OBJECTIVE BOX
24 HOUR EVENTS:  - admitted to SICU    NEURO  RASS (if intubated): 		CAM ICU (if concern for delirium):  Exam:   Meds: LORazepam   Injectable 2 milliGRAM(s) IV Push every 1 hour PRN CIWA-Ar score 8 or greater      RESPIRATORY  RR: 20 (05-29-24 @ 23:00) (20 - 27)  SpO2: 96% (05-29-24 @ 23:00) (92% - 100%)  Wt(kg): --  Exam:  Mechanical Ventilation:     Meds:     CARDIOVASCULAR  HR: 116 (05-29-24 @ 23:00) (104 - 118)  BP: 143/80 (05-29-24 @ 23:00) (118/81 - 157/85)  BP(mean): 105 (05-29-24 @ 23:00) (87 - 113)  ABP: --  ABP(mean): --  Wt(kg): --  CVP(cm H2O): --  VBG - ( 29 May 2024 23:00 )  pH: 7.48  /  pCO2: 36    /  pO2: 66    / HCO3: 27    / Base Excess: 3.4   /  SaO2: 94.6   Lactate: 1.6                Exam:   Cardiac Rhythm:   Perfusion     [ ]Adequate   [ ]Inadequate  Mentation   [ ]Normal       [ ]Reduced  Extremities  [ ]Warm         [ ]Cool  Volume Status [ ]Hypervolemic [ ]Euvolemic [ ]Hypovolemic  Meds:     GI/NUTRITION  Exam:   Diet:   Meds: lactulose Syrup 10 Gram(s) Oral every 4 hours  pantoprazole  Injectable 40 milliGRAM(s) IV Push every 24 hours      GENITOURINARY  I&O's Detail    05-29 @ 07:01  -  05-30 @ 00:13  --------------------------------------------------------  IN:    IV PiggyBack: 50 mL    IV PiggyBack: 770 mL    Oral Fluid: 70 mL  Total IN: 890 mL    OUT:    Voided (mL): 1800 mL  Total OUT: 1800 mL    Total NET: -910 mL        Weight (kg): 79.5 (05-29 @ 15:35), 81.6 (05-29 @ 08:54)  05-29    137  |  101  |  24<H>  ----------------------------<  140<H>  3.9   |  23  |  0.87    Ca    8.3<L>      29 May 2024 23:00  Phos  0.6     05-29  Mg     2.0     05-29    TPro  5.6<L>  /  Alb  3.1<L>  /  TBili  9.8<H>  /  DBili  x   /  AST  1806<H>  /  ALT  2982<H>  /  AlkPhos  264<H>  05-29    Meds: potassium phosphate IVPB 15 milliMole(s) IV Intermittent once  sodium phosphate 15 milliMole(s)/250 mL IVPB 15 milliMole(s) IV Intermittent once  sodium phosphate 15 milliMole(s)/250 mL IVPB 15 milliMole(s) IV Intermittent once  sodium phosphate 15 milliMole(s)/250 mL IVPB 15 milliMole(s) IV Intermittent once      HEMATOLOGIC  Meds: heparin   Injectable 5000 Unit(s) SubCutaneous every 12 hours                          13.0   8.18  )-----------( 98       ( 29 May 2024 23:00 )             36.8     PT/INR - ( 29 May 2024 13:56 )   PT: 39.2 sec;   INR: 3.70 ratio         PTT - ( 29 May 2024 13:56 )  PTT:39.0 sec    INFECTIOUS DISEASES  T(C): 36.7 (05-29-24 @ 23:00), Max: 37.3 (05-29-24 @ 13:43)  Wt(kg): --  WBC Count: 8.18 K/uL (05-29 @ 23:00)  WBC Count: 9.10 K/uL (05-29 @ 13:56)  WBC Count: 9.19 K/uL (05-29 @ 10:07)    Recent Cultures:    Meds: cefTRIAXone   IVPB 2000 milliGRAM(s) IV Intermittent every 24 hours      ENDOCRINE  Capillary Blood Glucose    Meds:

## 2024-05-30 NOTE — CONSULT NOTE ADULT - SUBJECTIVE AND OBJECTIVE BOX
Patient seen and evaluated @ 6 bpm in SICU  Chief Complaint: encephalopathy    HPI:  38M PMH HTN, ETOH use (daily drink) PSH gastric sleeve, initially admitted to [facility] for jaundice and confusion, found to have elevated transaminases to 4000s with high MELD and withdrawal symptoms, transferred to [facility] for liver transplant workup.     Majority of history obtained from chart as patient is encephalopathic and unable to provide reliable history.     Per chart, patient was brought to hospital by father after found at the roof of the house hallucinating. Patient had recently also been started on tetracycline for sore throat and reports that he became jaundiced afterwards. Patient reports no alcohol use disorder and that last drink was 6 days prior to admission, but per family, patient is heavy alcohol user. This is first hospitalization for alcohol decompensation.  (29 May 2024 16:44)    PMH:   No pertinent past medical history    Hypertension, unspecified type      PSH:   No significant past surgical history    H/O gastric sleeve      Medications:   albumin human 25% IVPB 50 milliLiter(s) IV Intermittent every 8 hours  cefTRIAXone   IVPB 2000 milliGRAM(s) IV Intermittent every 24 hours  chlorhexidine 2% Cloths 1 Application(s) Topical <User Schedule>  heparin   Injectable 5000 Unit(s) SubCutaneous every 12 hours  lactulose Syrup 20 Gram(s) Oral every 4 hours  LORazepam   Injectable 2 milliGRAM(s) IV Push every 1 hour PRN  pantoprazole  Injectable 40 milliGRAM(s) IV Push every 24 hours    Allergies:  No Known Allergies    FAMILY HISTORY:    Social History:  Smoking:  Alcohol:  Drugs:    Review of Systems:  [x]Unable to obtain         Physical Exam:  T(C): 36.4 (24 @ 19:00), Max: 37.2 (24 @ 07:00)  HR: 89 (24 @ 22:00) (89 - 135)  BP: 149/85 (24 @ 22:00) (122/66 - 159/91)  RR: 26 (24 @ 22:00) (19 - 39)  SpO2: 98% (24 @ 22:00) (89% - 99%)  Wt(kg): --     @ 07:01  -   07:00  --------------------------------------------------------  IN: 2247.5 mL / OUT: 1800 mL / NET: 447.5 mL     @ 07:01  -   @ 23:05  --------------------------------------------------------  IN: 1937.5 mL / OUT: 800 mL / NET: 1137.5 mL      Daily     Daily     Appearance: +Jaundice  Eyes: +scleral icterus   HENT: Normal oral mucosa  Cardiovascular: RRR, S1, S2, no murmur, rub, or gallop; no edema; no JVD  Procedural Access Site: Clean, dry, intact, without hematoma  Respiratory: Clear to auscultation bilaterally  Gastrointestinal: Soft, non-tender, non-distended, BS+  Musculoskeletal: No clubbing or joint deformity   Neurologic: No focal weakness  Lymphatic: No lymphadenopathy  Psychiatry: AAOx3 with appropriate mood and affect  Skin: No rashes, ecchymoses, or cyanosis    Cardiovascular Diagnostic Testing:  ECG: sinus tachy at 116 bpm, LVH    Echo:  < from: TTE W or WO Ultrasound Enhancing Agent (24 @ 10:25) >  _______________________________________________________________________________________     CONCLUSIONS:      1. Left ventricular systolic function is normal with an ejection fraction visually estimated at 55 to 60 %.   2. Normal right ventricular cavity size, with normal wall thickness, and normal systolic function.   3. Normal left and right atrial size.   4. No significant valvular disease.   5. No prior echocardiogram is available for comparison.   6. No pericardial effusion seen.    ________________________________________________________________________________________    < end of copied text >      Stress Testing:    Cath:    Interpretation of Telemetry:    Imaging:    Labs:                        12.3   7.86  )-----------( 116      ( 30 May 2024 11:48 )             35.3         137  |  100  |  17  ----------------------------<  177<H>  3.1<L>   |  24  |  0.76    Ca    8.3<L>      30 May 2024 11:48  Phos  2.2       Mg     2.1         TPro  5.1<L>  /  Alb  2.8<L>  /  TBili  9.8<H>  /  DBili  x   /  AST  775<H>  /  ALT  2235<H>  /  AlkPhos  270<H>      PT/INR - ( 29 May 2024 23:00 )   PT: 34.3 sec;   INR: 3.23 ratio         PTT - ( 29 May 2024 23:00 )  PTT:36.5 sec  CARDIAC MARKERS ( 29 May 2024 10:07 )  x     / x     / 314 U/L / x     / 11.8 ng/mL        Total Cholesterol: 83  LDL: --  HDL: 16  T      Thyroid Stimulating Hormone, Serum: 0.05 uIU/mL ( @ 17:13)

## 2024-05-30 NOTE — PHYSICAL THERAPY INITIAL EVALUATION ADULT - GENERAL OBSERVATIONS, REHAB EVAL
Pt received supine in bed this PM, +ICU monitoring, +IVL, family at bedside, +1:1 observation, A&OX2 (self, place), 75% simple command follow

## 2024-05-30 NOTE — OCCUPATIONAL THERAPY INITIAL EVALUATION ADULT - PERTINENT HX OF CURRENT PROBLEM, REHAB EVAL
38M PMH HTN, ETOH use (daily drink) PSH gastric sleeve, initially admitted to [facility] for jaundice and confusion, found to have elevated transaminases to 4000s with high MELD and withdrawal symptoms, transferred to [facility] for liver transplant workup. Majority of history obtained from chart as patient is encephalopathic and unable to provide reliable history.  Per chart, patient was brought to hospital by father after found at the roof of the house hallucinating. Patient had recently also been started on tetracycline for sore throat and reports that he became jaundiced afterwards. Patient reports no alcohol use disorder and that last drink was 6 days prior to admission, but per family, patient is heavy alcohol user. This is first hospitalization for alcohol decompensation.

## 2024-05-30 NOTE — CONSULT NOTE ADULT - ASSESSMENT
38M PMH HTN, ETOH use (daily drink) PSH gastric sleeve, initially admitted to [facility] for jaundice and confusion, found to have elevated transaminases to 4000s with high MELD and withdrawal symptoms, transferred to [facility] for liver transplant workup.     MRSA/MSSA nasal PCR (May 29) negative  Urine culture (May 29) no growth  Blood cultures (May 29) no growth to date    CT neck (May 29) Enlarged palatine tonsils without hyperemia or surrounding infiltrative changes.  CT chest (May 29) no focal consolidation  CT abdomen pelvis (May 29) Diffuse, marked hepatic steatosis, Question of mild gallbladder wall thickening    #Acute liver injury, transaminitis  Hepatitis A IgM (May 29) negative  Hepatitis B DNA by PCR (May 29) negative  Hepatitis C RNA (May 29) negative  CMV by PCR (May 29) negative  -- Recommend HSV 1/2 serum PCR (ordered)  -- Recommend adenovirus serum PCR (ordered)  --Recommend varicella DNA PCR (ordered)  -- Recommend HIV viral load (ordered)  --Can continue empiric ceftriaxone for now pending culture workup    #Preliver transplant evaluation  COVID19 Ortiz Antibody positive  HAV IgG positive  HBVs Ab Negative, HBVs Ag Negative, HBVc Ab negative, HBV PCR Negative  HCV Ab negative, HCV PCR Negative  HSV 1 IgG positive  EBV IgG positive  CMV IgG positive  VZV IgG positive  Measles IgG negative, Mumps IgG negative  HIV Ag/Ab by CMIA negative  Toxoplasma IgG positive  --Ideally would wait for further culture data and QuantiFERON gold but given unrevealing CT imaging no absolute ID contraindication to liver transplantation if urgently indicated  --Recommend COVID19 Nucleocapsid Antibody (ordered)  --Recommend Coccidioides antibody (ordered)  --Recommend Trypanosoma cruzi Ab (ordered)  --Recommend rubella IgG (ordered)  --Recommend HSV 2 IgG  -- Follow-up on Strongyloides Ab   -- Follow-up on Quantiferon Gold  -- Follow-up on syphilis Screen  -- Follow-up on Strongyloides Ab    #Encounter to Vaccinate Patient - Will defer vaccinations until further clinical stability given acuity of presentation  COVID19: Would benefit from COVID19 [id number] Vaccine Dose  Influenza: Will require with next season  Pneumococcal: Would benefit from PCV20  HAV: Immune, would not require further vaccination  HBV: Would benefit from Heplisav vaccination  MMR: Not mumps or rubeola immune, ideally would require dose of MMR but this would be a live vaccine  Varicella: Immune, would not require further vaccination  Shingles: Will require Shingrix  Tdap: Will require Tdap    I will continue to follow. Please feel free to contact me with any further questions.    Steve Trevizo M.D.  [facility] Division of Infectious Disease  8AM-5PM Monday - Friday: Available on [employer] Teams  After Hours and Holidays (or if no response on [employer] Teams): Please contact the Infectious Diseases Office at [phone]    The above assessment and plan were discussed with CORAZON Riley

## 2024-05-30 NOTE — PROGRESS NOTE ADULT - SUBJECTIVE AND OBJECTIVE BOX
Transplant Surgery - Multidisciplinary Rounds  --------------------------------------------------------------  Present:   Patient seen and examined with multidisciplinary Transplant team including Surgeon: Dr Amaya, Hepatologist: Dr Peterson,  Pharmacist: CROW Farmer/Myron, and beside RN during AM rounds. Disciplines not in attendance will be notified of the plan.     Interval Events:  -transferred from [facility] with AMS and transaminitis   - LFTs trending down AST 1155 (1806), ALT 2623 (2982)  - lactate 6 -> 1.6   - AMS     Potential Discharge date: TBD  Education:  Medications  Plan of care:  See Below    MEDICATIONS  (STANDING):  acetylcysteine IVPB 8 Gram(s) IV Intermittent once  albumin human 25% IVPB 50 milliLiter(s) IV Intermittent every 8 hours  cefTRIAXone   IVPB 2000 milliGRAM(s) IV Intermittent every 24 hours  chlorhexidine 2% Cloths 1 Application(s) Topical <User Schedule>  heparin   Injectable 5000 Unit(s) SubCutaneous every 12 hours  lactulose Syrup 20 Gram(s) Oral every 4 hours  pantoprazole  Injectable 40 milliGRAM(s) IV Push every 24 hours    MEDICATIONS  (PRN):  LORazepam   Injectable 2 milliGRAM(s) IV Push every 1 hour PRN CIWA-Ar score 8 or greater      PAST MEDICAL & SURGICAL HISTORY:  Hypertension, unspecified type  H/O gastric sleeve    Vital Signs Last 24 Hrs  T(C): 37.2 (30 May 2024 07:00), Max: 37.3 (29 May 2024 13:43)  T(F): 99 (30 May 2024 07:00), Max: 99.1 (29 May 2024 13:43)  HR: 109 (30 May 2024 11:00) (104 - 135)  BP: 132/76 (30 May 2024 11:00) (118/81 - 159/83)  BP(mean): 98 (30 May 2024 11:00) (87 - 117)  RR: 27 (30 May 2024 11:00) (20 - 33)  SpO2: 95% (30 May 2024 11:00) (89% - 100%)    Parameters below as of 30 May 2024 08:00  Patient On (Oxygen Delivery Method): nasal cannula  O2 Flow (L/min): 2      I&O's Summary    29 May 2024 07:01  -  30 May 2024 07:00  --------------------------------------------------------  IN: 2247.5 mL / OUT: 1800 mL / NET: 447.5 mL    30 May 2024 07:01  -  30 May 2024 12:38  --------------------------------------------------------  IN: 822.5 mL / OUT: 0 mL / NET: 822.5 mL                          13.0   8.18  )-----------( 98       ( 29 May 2024 23:00 )             36.8     05-30    137  |  103  |  21  ----------------------------<  126<H>  3.8   |  20<L>  |  0.88    Ca    8.9      30 May 2024 06:53  Phos  0.6     05-30  Mg     2.3     05-30    TPro  5.8<L>  /  Alb  3.0<L>  /  TBili  10.6<H>  /  DBili  x   /  AST  1155<H>  /  ALT  2623<H>  /  AlkPhos  292<H>  05-30    Review of systems  unable to obtain, AMS      PHYSICAL EXAM:  GENERAL: Patient appears confused  HEENT:  NCAT, + scleral icterus  CHEST: no resp distress  HEART:  RRR  ABDOMEN:  Soft, non-tender, non-distended, no masses  EXTREMITIES:  No cyanosis, clubbing, or edema  SKIN:  No rash/erythema/ecchymoses/petechiae/wounds/abscess/warm/dry  NEURO:  Alert and oriented x 1

## 2024-05-30 NOTE — CONSULT NOTE ADULT - SUBJECTIVE AND OBJECTIVE BOX
Patient is a 38y old  Male who presents with a chief complaint of liver transplant workup (30 May 2024 16:05)      HPI:  38M PMH HTN, ETOH use (daily drink) PSH gastric sleeve, initially admitted to [facility] for jaundice and confusion, found to have elevated transaminases to 4000s with high MELD and withdrawal symptoms, transferred to [facility] for liver transplant workup.     Majority of history obtained from chart as patient is encephalopathic and unable to provide reliable history.     Per chart, patient was brought to hospital by father after found at the roof of the house hallucinating. Patient had recently also been started on tetracycline for sore throat and reports that he became jaundiced afterwards. Patient reports no alcohol use disorder and that last drink was 6 days prior to admission, but per family, patient is heavy alcohol user. This is first hospitalization for alcohol decompensation.  (29 May 2024 16:44)     prior hospital charts reviewed [  ]  primary team notes reviewed [  ]  other consultant notes reviewed [  ]    PAST MEDICAL & SURGICAL HISTORY:  Hypertension, unspecified type      H/O gastric sleeve          Allergies  No Known Allergies    ANTIMICROBIALS (past 90 days)  MEDICATIONS  (STANDING):  cefTRIAXone   IVPB   100 mL/Hr IV Intermittent (05-30-24 @ 17:53)   100 mL/Hr IV Intermittent (05-29-24 @ 18:08)    cefTRIAXone   IVPB   100 mL/Hr IV Intermittent (05-29-24 @ 14:10)      ANTIMICROBIALS:    cefTRIAXone   IVPB 2000 every 24 hours    OTHER MEDS: MEDICATIONS  (STANDING):  heparin   Injectable 5000 every 12 hours  lactulose Syrup 20 every 4 hours  LORazepam   Injectable 2 every 1 hour PRN  pantoprazole  Injectable 40 every 24 hours    SOCIAL HISTORY:   hx smoking  non-smoker    FAMILY HISTORY:    REVIEW OF SYSTEMS  [  ] ROS unobtainable because:    [  ] All other systems negative except as noted below:	    Constitutional:  [ ] fever [ ] chills  [ ] weight loss  [ ] weakness  Skin:  [ ] rash [ ] phlebitis	  Eyes: [ ] icterus [ ] pain  [ ] discharge	  ENMT: [ ] sore throat  [ ] thrush [ ] ulcers [ ] exudates  Respiratory: [ ] dyspnea [ ] hemoptysis [ ] cough [ ] sputum	  Cardiovascular:  [ ] chest pain [ ] palpitations [ ] edema	  Gastrointestinal:  [ ] nausea [ ] vomiting [ ] diarrhea [ ] constipation [ ] pain	  Genitourinary:  [ ] dysuria [ ] frequency [ ] hematuria [ ] discharge [ ] flank pain  [ ] incontinence  Musculoskeletal:  [ ] myalgias [ ] arthralgias [ ] arthritis  [ ] back pain  Neurological:  [ ] headache [ ] seizures  [ ] confusion/altered mental status  Psychiatric:  [ ] anxiety [ ] depression	  Hematology/Lymphatics:  [ ] lymphadenopathy  Endocrine:  [ ] adrenal [ ] thyroid  Allergic/Immunologic:	 [ ] transplant [ ] seasonal    Vital Signs Last 24 Hrs  T(F): 98.4 (05-30-24 @ 15:00), Max: 99.1 (05-29-24 @ 13:43)  Vital Signs Last 24 Hrs  HR: 106 (05-30-24 @ 19:00) (95 - 135)  BP: 159/91 (05-30-24 @ 19:00) (122/66 - 159/91)  RR: 32 (05-30-24 @ 19:00)  SpO2: 93% (05-30-24 @ 19:00) (89% - 99%)  Wt(kg): --    PHYSICAL EXAMINATION:  General: Alert and Awake, NAD, Jaundice, somnolent  HEENT: PERRL, EOMI, scleral icterus  Neck: Supple,  Cardiac: RRR, No M/R/G  Resp: CTAB, No Wh/Rh/Ra  Abdomen: NBS, NT/ND, No HSM, No rigidity or guarding  MSK: No LE edema. No stigmata of IE. No evidence of phlebitis. No evidence of synovitis.  : No levy  Skin: No rashes or lesions. Skin is warm and dry to the touch.   Neuro: Alert and Awake. CN 2-12 Grossly intact. Moves all four extremities spontaneously.  Psych: Calm, Pleasant, Cooperative                          12.3   7.86  )-----------( 116      ( 30 May 2024 11:48 )             35.3     05-30    137  |  100  |  17  ----------------------------<  177<H>  3.1<L>   |  24  |  0.76    Ca    8.3<L>      30 May 2024 11:48  Phos  2.2     05-30  Mg     2.1     05-30    TPro  5.1<L>  /  Alb  2.8<L>  /  TBili  9.8<H>  /  DBili  x   /  AST  775<H>  /  ALT  2235<H>  /  AlkPhos  270<H>  05-30    Urinalysis Basic - ( 30 May 2024 11:48 )    Color: x / Appearance: x / SG: x / pH: x  Gluc: 177 mg/dL / Ketone: x  / Bili: x / Urobili: x   Blood: x / Protein: x / Nitrite: x   Leuk Esterase: x / RBC: x / WBC x   Sq Epi: x / Non Sq Epi: x / Bacteria: x    MICROBIOLOGY:  Culture - Urine (collected 29 May 2024 17:16)  Source: Clean Catch Clean Catch (Midstream)  Final Report (30 May 2024 14:02):    <10,000 CFU/mL Normal Urogenital Aviva    Culture - Blood (collected 29 May 2024 13:40)  Source: .Blood Blood-Peripheral  Preliminary Report (30 May 2024 18:02):    No growth at 24 hours    Culture - Blood (collected 29 May 2024 13:30)  Source: .Blood Blood-Peripheral  Preliminary Report (30 May 2024 18:02):    No growth at 24 hours    Culture - Blood (collected 29 May 2024 12:00)  Source: .Blood Blood  Preliminary Report (30 May 2024 17:02):    No growth at 24 hours    Culture - Blood (collected 29 May 2024 11:50)  Source: .Blood Blood  Preliminary Report (30 May 2024 17:02):    No growth at 24 hours            CMV IgG Antibody: 4.50 U/mL (05-29-24 @ 17:13)  Toxoplasma IgG Screen: 48.5 IU/mL (05-29-24 @ 17:13)    CMVPCR Log: NotDetec [id number]/mL (05-29 @ 17:13)        RADIOLOGY:    <The imaging below has been reviewed and visualized by me independently. Findings as detailed in report below>    < from: Xray Chest 1 View- PORTABLE-Urgent (05.29.24 @ 14:40) >  IMPRESSION:  Clear lungs.    < end of copied text >     Patient is a 38y old  Male who presents with a chief complaint of liver transplant workup (30 May 2024 16:05)      HPI:  38M PMH HTN, ETOH use (daily drink) PSH gastric sleeve, initially admitted to [facility] for jaundice and confusion, found to have elevated transaminases to 4000s with high MELD and withdrawal symptoms, transferred to [facility] for liver transplant workup.     Majority of history obtained from chart as patient is encephalopathic and unable to provide reliable history.     Per chart, patient was brought to hospital by father after found at the roof of the house hallucinating. Patient had recently also been started on tetracycline for sore throat and reports that he became jaundiced afterwards. Patient reports no alcohol use disorder and that last drink was 6 days prior to admission, but per family, patient is heavy alcohol user. This is first hospitalization for alcohol decompensation.  (29 May 2024 16:44)     prior hospital charts reviewed [  ]  primary team notes reviewed [ x ]  other consultant notes reviewed [x  ]    PAST MEDICAL & SURGICAL HISTORY:  Hypertension, unspecified type      H/O gastric sleeve          Allergies  No Known Allergies    ANTIMICROBIALS (past 90 days)  MEDICATIONS  (STANDING):  cefTRIAXone   IVPB   100 mL/Hr IV Intermittent (05-30-24 @ 17:53)   100 mL/Hr IV Intermittent (05-29-24 @ 18:08)    cefTRIAXone   IVPB   100 mL/Hr IV Intermittent (05-29-24 @ 14:10)      ANTIMICROBIALS:    cefTRIAXone   IVPB 2000 every 24 hours    OTHER MEDS: MEDICATIONS  (STANDING):  heparin   Injectable 5000 every 12 hours  lactulose Syrup 20 every 4 hours  LORazepam   Injectable 2 every 1 hour PRN  pantoprazole  Injectable 40 every 24 hours    SOCIAL HISTORY: Somewhat limited history given patient's mental status but patient states he was born in [facility]..  States he moved [facility] as a child.  Denies any recent travel.  Denies living in any other states besides [place].    FAMILY HISTORY: noncontributory    REVIEW OF SYSTEMS  [  ] ROS unobtainable because:    [x  ] All other systems negative except as noted below:	    Constitutional:  [ ] fever [ ] chills  [ ] weight loss  [x ] weakness  Skin:  [ ] rash [ ] phlebitis	  Eyes: [ ] icterus [ ] pain  [ ] discharge	  ENMT: [ ] sore throat  [ ] thrush [ ] ulcers [ ] exudates  Respiratory: [ ] dyspnea [ ] hemoptysis [ ] cough [ ] sputum	  Cardiovascular:  [ ] chest pain [ ] palpitations [ ] edema	  Gastrointestinal:  [ ] nausea [ ] vomiting [ ] diarrhea [ ] constipation [ ] pain	  Genitourinary:  [ ] dysuria [ ] frequency [ ] hematuria [ ] discharge [ ] flank pain  [ ] incontinence  Musculoskeletal:  [ ] myalgias [ ] arthralgias [ ] arthritis  [ ] back pain  Neurological:  [ ] headache [ ] seizures  [ ] confusion/altered mental status  Psychiatric:  [ ] anxiety [ ] depression	  Hematology/Lymphatics:  [ ] lymphadenopathy  Endocrine:  [ ] adrenal [ ] thyroid  Allergic/Immunologic:	 [ ] transplant [ ] seasonal    Vital Signs Last 24 Hrs  T(F): 98.4 (05-30-24 @ 15:00), Max: 99.1 (05-29-24 @ 13:43)  Vital Signs Last 24 Hrs  HR: 106 (05-30-24 @ 19:00) (95 - 135)  BP: 159/91 (05-30-24 @ 19:00) (122/66 - 159/91)  RR: 32 (05-30-24 @ 19:00)  SpO2: 93% (05-30-24 @ 19:00) (89% - 99%)  Wt(kg): --    PHYSICAL EXAMINATION:  General: Alert and Awake, NAD, Jaundice, somnolent  HEENT: PERRL, EOMI, scleral icterus  Neck: Supple,  Cardiac: RRR, No M/R/G  Resp: CTAB, No Wh/Rh/Ra  Abdomen: NBS, NT/ND, No HSM, No rigidity or guarding  MSK: No LE edema. No stigmata of IE. No evidence of phlebitis. No evidence of synovitis.  : No levy  Skin: No rashes or lesions. Skin is warm and dry to the touch.   Neuro: Alert and Awake. CN 2-12 Grossly intact. Moves all four extremities spontaneously.  Psych: Calm, Pleasant, Cooperative                          12.3   7.86  )-----------( 116      ( 30 May 2024 11:48 )             35.3     05-30    137  |  100  |  17  ----------------------------<  177<H>  3.1<L>   |  24  |  0.76    Ca    8.3<L>      30 May 2024 11:48  Phos  2.2     05-30  Mg     2.1     05-30    TPro  5.1<L>  /  Alb  2.8<L>  /  TBili  9.8<H>  /  DBili  x   /  AST  775<H>  /  ALT  2235<H>  /  AlkPhos  270<H>  05-30    Urinalysis Basic - ( 30 May 2024 11:48 )    Color: x / Appearance: x / SG: x / pH: x  Gluc: 177 mg/dL / Ketone: x  / Bili: x / Urobili: x   Blood: x / Protein: x / Nitrite: x   Leuk Esterase: x / RBC: x / WBC x   Sq Epi: x / Non Sq Epi: x / Bacteria: x    MICROBIOLOGY:  Culture - Urine (collected 29 May 2024 17:16)  Source: Clean Catch Clean Catch (Midstream)  Final Report (30 May 2024 14:02):    <10,000 CFU/mL Normal Urogenital Aviva    Culture - Blood (collected 29 May 2024 13:40)  Source: .Blood Blood-Peripheral  Preliminary Report (30 May 2024 18:02):    No growth at 24 hours    Culture - Blood (collected 29 May 2024 13:30)  Source: .Blood Blood-Peripheral  Preliminary Report (30 May 2024 18:02):    No growth at 24 hours    Culture - Blood (collected 29 May 2024 12:00)  Source: .Blood Blood  Preliminary Report (30 May 2024 17:02):    No growth at 24 hours    Culture - Blood (collected 29 May 2024 11:50)  Source: .Blood Blood  Preliminary Report (30 May 2024 17:02):    No growth at 24 hours            CMV IgG Antibody: 4.50 U/mL (05-29-24 @ 17:13)  Toxoplasma IgG Screen: 48.5 IU/mL (05-29-24 @ 17:13)    CMVPCR Log: NotDetec [id number]/mL (05-29 @ 17:13)        RADIOLOGY:    <The imaging below has been reviewed and visualized by me independently. Findings as detailed in report below>    < from: Xray Chest 1 View- PORTABLE-Urgent (05.29.24 @ 14:40) >  IMPRESSION:  Clear lungs.    < end of copied text >

## 2024-05-30 NOTE — PROGRESS NOTE ADULT - ASSESSMENT
38M PMH HTN, ETOH use (daily drink) PSH gastric sleeve, initially admitted to [facility] for jaundice and confusion, found to have elevated transaminases to 4000s with high MELD and withdrawal symptoms, transferred to [facility] for liver transplant workup.     [] Alcohol use disorder   [] ETOH Cirrhosis   - LFTs trending down    - CT chest/ A/P with diffuse, marked hepatic steatosis   - CT head with no acute intracranial hemorrhage, mass effect or midline shift.    Recommendations:   - NAC gtt   - CIWA (please give symptom triggered ativan vs precedex, please avoid librium and phenobarbital given   - daily MELD labs   - ceftriaxone (empiric given high MELD)   - followup infectious workup, including blood cultures  - please send alpha-1 anti-trypsin (phenotype), ceruloplasma, AFP  - please send MERLY, anti-mitochondrial antibody, anti-smooth muscle antibody, anti-liver kidney antibody, immunoglobulins (IgG, IgM, IgA quantitative) for autoimmune etiologies  38M PMH HTN, ETOH use (daily drink) PSH gastric sleeve, initially admitted to [facility] for jaundice and confusion, found to have elevated transaminases to 4000s with high MELD and withdrawal symptoms, transferred to [facility] for liver transplant workup.     [] Alcohol use disorder   [] ETOH Cirrhosis   - LFTs trending down    - CT chest/ A/P with diffuse, marked hepatic steatosis   - CT head with no acute intracranial hemorrhage, mass effect or midline shift.  - Continue NAC gtt  - albumin tid   - labs bid   - incr lact 20mg tid

## 2024-05-30 NOTE — PROGRESS NOTE ADULT - ASSESSMENT
38M PMH HTN, ETOH use (daily drink) PSH gastric sleeve, initially admitted to [facility] for jaundice and confusion, found to have elevated transaminases to 4000s with high MELD and withdrawal symptoms, transferred to [facility] for liver transplant workup.     #alcohol use disorder   #cirrhosis, likely 2/2 liver disease (MELD 34)   - hepatitis B surface antibody, hepatitis B surface antigen, hepatitis core antibody, hepatitis C NR   - no known endoscopy   - CT chest/ A/P with diffuse, marked hepatic steatosis   - CT head with no acute intracranial hemorrhage, mass effect or midline shift.    Recommendations:   - NAC gtt   - CIWA (please give symptom triggered ativan vs precedex, please avoid librium and phenobarbital given   - daily MELD labs   - ceftriaxone (empiric given high MELD)   - infectious workup, including blood cultures  - followup alpha-1 anti-trypsin (phenotype), ceruloplasma, AFP  - followup MERLY, anti-mitochondrial antibody, anti-smooth muscle antibody, anti-liver kidney antibody, immunoglobulins (IgG, IgM, IgA quantitative) for autoimmune etiologies   - liver transplant workup     All recommendations are tentative until note is attested by attending.     Marilyn Naylor, PGY-5  Gastroenterology/Hepatology Fellow  Available on [employer] Teams  [phone] ([facility] Short Range Pager)  [phone] ([facility] Long Range Pager)    On Weekends/Holidays (All day) and Weekdays after 5 PM to 8AM:  For non-urgent consults, please email [email or web address] or [email or web address]  For emergent consults, please contact on call GI team.  See Amion schedule ([facility]), [employer] paging system ([facility]), or call hospital  ([facility])

## 2024-05-30 NOTE — CONSULT NOTE ADULT - SUBJECTIVE AND OBJECTIVE BOX
Patient is a 38y old  Male who presents with a chief complaint of liver transplant workup (30 May 2024 15:47)      HPI:  38M PMH HTN, ETOH use (daily drink) PSH gastric sleeve, initially admitted to [facility] for jaundice and confusion, found to have elevated transaminases to 4000s with high MELD and withdrawal symptoms, transferred to [facility] for liver transplant workup.     Majority of history obtained from chart as patient is encephalopathic and unable to provide reliable history.     Per chart, patient was brought to hospital by father after found at the roof of the house hallucinating. Patient had recently also been started on tetracycline for sore throat and reports that he became jaundiced afterwards. Patient reports no alcohol use disorder and that last drink was 6 days prior to admission, but per family, patient is heavy alcohol user. This is first hospitalization for alcohol decompensation.  (29 May 2024 16:44)     MRSA/MSSA nasal PCR (May 29) negative  Urine culture (May 29) no growth    CT Chest (5/29) Patent central airways. Motion artifact limits fine evaluation lung parenchyma. No focal consolidation or discrete mass is seen.  CT A/P (5/29) Question of mild gallbladder wall thickening.     prior hospital charts reviewed [  ]  primary team notes reviewed [  ]  other consultant notes reviewed [  ]    PAST MEDICAL & SURGICAL HISTORY:  Hypertension, unspecified type      H/O gastric sleeve          Allergies  No Known Allergies    ANTIMICROBIALS (past 90 days)  MEDICATIONS  (STANDING):  cefTRIAXone   IVPB   100 mL/Hr IV Intermittent (05-29-24 @ 18:08)    cefTRIAXone   IVPB   100 mL/Hr IV Intermittent (05-29-24 @ 14:10)      ANTIMICROBIALS:    cefTRIAXone   IVPB 2000 every 24 hours    OTHER MEDS: MEDICATIONS  (STANDING):  heparin   Injectable 5000 every 12 hours  lactulose Syrup 20 every 4 hours  LORazepam   Injectable 2 every 1 hour PRN  pantoprazole  Injectable 40 every 24 hours    SOCIAL HISTORY:   hx smoking  non-smoker    FAMILY HISTORY:    REVIEW OF SYSTEMS  [  ] ROS unobtainable because:    [  ] All other systems negative except as noted below:	    Constitutional:  [ ] fever [ ] chills  [ ] weight loss  [ ] weakness  Skin:  [ ] rash [ ] phlebitis	  Eyes: [ ] icterus [ ] pain  [ ] discharge	  ENMT: [ ] sore throat  [ ] thrush [ ] ulcers [ ] exudates  Respiratory: [ ] dyspnea [ ] hemoptysis [ ] cough [ ] sputum	  Cardiovascular:  [ ] chest pain [ ] palpitations [ ] edema	  Gastrointestinal:  [ ] nausea [ ] vomiting [ ] diarrhea [ ] constipation [ ] pain	  Genitourinary:  [ ] dysuria [ ] frequency [ ] hematuria [ ] discharge [ ] flank pain  [ ] incontinence  Musculoskeletal:  [ ] myalgias [ ] arthralgias [ ] arthritis  [ ] back pain  Neurological:  [ ] headache [ ] seizures  [ ] confusion/altered mental status  Psychiatric:  [ ] anxiety [ ] depression	  Hematology/Lymphatics:  [ ] lymphadenopathy  Endocrine:  [ ] adrenal [ ] thyroid  Allergic/Immunologic:	 [ ] transplant [ ] seasonal    Vital Signs Last 24 Hrs  T(F): 98.4 (05-30-24 @ 15:00), Max: 99.1 (05-29-24 @ 13:43)  Vital Signs Last 24 Hrs  HR: 95 (05-30-24 @ 16:00) (95 - 135)  BP: 137/78 (05-30-24 @ 16:00) (122/66 - 159/83)  RR: 25 (05-30-24 @ 16:00)  SpO2: 97% (05-30-24 @ 16:00) (89% - 99%)  Wt(kg): --    PHYSICAL EXAMINATION:  General: Alert and Awake, NAD  HEENT: PERRL, EOMI, No subconjunctival hemorrhages, Oropharynx Clear, MMM  Neck: Supple, No MARIETTA  Cardiac: RRR, No M/R/G  Resp: CTAB, No Wh/Rh/Ra  Abdomen: NBS, NT/ND, No HSM, No rigidity or guarding  MSK: No LE edema. No stigmata of IE. No evidence of phlebitis. No evidence of synovitis.  : No levy  Skin: No rashes or lesions. Skin is warm and dry to the touch.   Neuro: Alert and Awake. CN 2-12 Grossly intact. Moves all four extremities spontaneously.  Psych: Calm, Pleasant, Cooperative                          12.3   7.86  )-----------( 116      ( 30 May 2024 11:48 )             35.3     05-30    137  |  100  |  17  ----------------------------<  177<H>  3.1<L>   |  24  |  0.76    Ca    8.3<L>      30 May 2024 11:48  Phos  2.2     05-30  Mg     2.1     05-30    TPro  5.1<L>  /  Alb  2.8<L>  /  TBili  9.8<H>  /  DBili  x   /  AST  775<H>  /  ALT  2235<H>  /  AlkPhos  270<H>  05-30    Urinalysis Basic - ( 30 May 2024 11:48 )    Color: x / Appearance: x / SG: x / pH: x  Gluc: 177 mg/dL / Ketone: x  / Bili: x / Urobili: x   Blood: x / Protein: x / Nitrite: x   Leuk Esterase: x / RBC: x / WBC x   Sq Epi: x / Non Sq Epi: x / Bacteria: x    MICROBIOLOGY:  Culture - Urine (collected 29 May 2024 17:16)  Source: Clean Catch Clean Catch (Midstream)  Final Report (30 May 2024 14:02):    <10,000 CFU/mL Normal Urogenital Aviva              CMVPCR Log: NotDetec Brz46IX/mL (05-29 @ 17:13)        RADIOLOGY:    <The imaging below has been reviewed and visualized by me independently. Findings as detailed in report below>

## 2024-05-31 DIAGNOSIS — Z01.818 ENCOUNTER FOR OTHER PREPROCEDURAL EXAMINATION: ICD-10-CM

## 2024-05-31 LAB
ALBUMIN SERPL ELPH-MCNC: 3.2 G/DL — LOW (ref 3.3–5)
ALBUMIN SERPL ELPH-MCNC: 3.5 G/DL — SIGNIFICANT CHANGE UP (ref 3.3–5)
ALP SERPL-CCNC: 267 U/L — HIGH (ref 40–120)
ALP SERPL-CCNC: 275 U/L — HIGH (ref 40–120)
ALT FLD-CCNC: 1313 U/L — HIGH (ref 10–45)
ALT FLD-CCNC: 1677 U/L — HIGH (ref 10–45)
AMMONIA BLD-MCNC: 54 UMOL/L — SIGNIFICANT CHANGE UP (ref 11–55)
ANA TITR SER: NEGATIVE — SIGNIFICANT CHANGE UP
ANION GAP SERPL CALC-SCNC: 11 MMOL/L — SIGNIFICANT CHANGE UP (ref 5–17)
ANION GAP SERPL CALC-SCNC: 14 MMOL/L — SIGNIFICANT CHANGE UP (ref 5–17)
APTT BLD: 30.9 SEC — SIGNIFICANT CHANGE UP (ref 24.5–35.6)
AST SERPL-CCNC: 166 U/L — HIGH (ref 10–40)
AST SERPL-CCNC: 383 U/L — HIGH (ref 10–40)
BASE EXCESS BLDV CALC-SCNC: 5 MMOL/L — HIGH (ref -2–3)
BILIRUB SERPL-MCNC: 11.3 MG/DL — HIGH (ref 0.2–1.2)
BILIRUB SERPL-MCNC: 12.6 MG/DL — HIGH (ref 0.2–1.2)
BUN SERPL-MCNC: 11 MG/DL — SIGNIFICANT CHANGE UP (ref 7–23)
BUN SERPL-MCNC: 11 MG/DL — SIGNIFICANT CHANGE UP (ref 7–23)
CA-I SERPL-SCNC: 1.19 MMOL/L — SIGNIFICANT CHANGE UP (ref 1.15–1.33)
CALCIUM SERPL-MCNC: 8.8 MG/DL — SIGNIFICANT CHANGE UP (ref 8.4–10.5)
CALCIUM SERPL-MCNC: 9 MG/DL — SIGNIFICANT CHANGE UP (ref 8.4–10.5)
CHLORIDE BLDV-SCNC: 103 MMOL/L — SIGNIFICANT CHANGE UP (ref 96–108)
CHLORIDE SERPL-SCNC: 101 MMOL/L — SIGNIFICANT CHANGE UP (ref 96–108)
CHLORIDE SERPL-SCNC: 104 MMOL/L — SIGNIFICANT CHANGE UP (ref 96–108)
CO2 BLDV-SCNC: 30 MMOL/L — HIGH (ref 22–26)
CO2 SERPL-SCNC: 22 MMOL/L — SIGNIFICANT CHANGE UP (ref 22–31)
CO2 SERPL-SCNC: 25 MMOL/L — SIGNIFICANT CHANGE UP (ref 22–31)
CREAT SERPL-MCNC: 0.5 MG/DL — SIGNIFICANT CHANGE UP (ref 0.5–1.3)
CREAT SERPL-MCNC: 0.62 MG/DL — SIGNIFICANT CHANGE UP (ref 0.5–1.3)
EBV DNA SERPL NAA+PROBE-ACNC: ABNORMAL IU/ML
EBVPCR LOG: ABNORMAL LOG10IU/ML
EGFR: 126 ML/MIN/1.73M2 — SIGNIFICANT CHANGE UP
EGFR: 135 ML/MIN/1.73M2 — SIGNIFICANT CHANGE UP
GAS PNL BLDV: 135 MMOL/L — LOW (ref 136–145)
GAS PNL BLDV: SIGNIFICANT CHANGE UP
GAS PNL BLDV: SIGNIFICANT CHANGE UP
GLUCOSE BLDC GLUCOMTR-MCNC: 201 MG/DL — HIGH (ref 70–99)
GLUCOSE BLDV-MCNC: 170 MG/DL — HIGH (ref 70–99)
GLUCOSE SERPL-MCNC: 168 MG/DL — HIGH (ref 70–99)
GLUCOSE SERPL-MCNC: 246 MG/DL — HIGH (ref 70–99)
HCO3 BLDV-SCNC: 29 MMOL/L — SIGNIFICANT CHANGE UP (ref 22–29)
HCT VFR BLD CALC: 35.7 % — LOW (ref 39–50)
HCT VFR BLDA CALC: 40 % — SIGNIFICANT CHANGE UP (ref 39–51)
HGB BLD CALC-MCNC: 13.3 G/DL — SIGNIFICANT CHANGE UP (ref 12.6–17.4)
HGB BLD-MCNC: 12.7 G/DL — LOW (ref 13–17)
HOROWITZ INDEX BLDV+IHG-RTO: 21 — SIGNIFICANT CHANGE UP
INR BLD: 2.7 RATIO — HIGH (ref 0.85–1.18)
LACTATE BLDV-MCNC: 1.1 MMOL/L — SIGNIFICANT CHANGE UP (ref 0.5–2)
MAGNESIUM SERPL-MCNC: 2.2 MG/DL — SIGNIFICANT CHANGE UP (ref 1.6–2.6)
MAGNESIUM SERPL-MCNC: 2.2 MG/DL — SIGNIFICANT CHANGE UP (ref 1.6–2.6)
MCHC RBC-ENTMCNC: 32 PG — SIGNIFICANT CHANGE UP (ref 27–34)
MCHC RBC-ENTMCNC: 35.6 GM/DL — SIGNIFICANT CHANGE UP (ref 32–36)
MCV RBC AUTO: 89.9 FL — SIGNIFICANT CHANGE UP (ref 80–100)
NRBC # BLD: 0 /100 WBCS — SIGNIFICANT CHANGE UP (ref 0–0)
PCO2 BLDV: 40 MMHG — LOW (ref 42–55)
PH BLDV: 7.47 — HIGH (ref 7.32–7.43)
PHOSPHATE SERPL-MCNC: 1.4 MG/DL — LOW (ref 2.5–4.5)
PHOSPHATE SERPL-MCNC: 2.7 MG/DL — SIGNIFICANT CHANGE UP (ref 2.5–4.5)
PLATELET # BLD AUTO: 127 K/UL — LOW (ref 150–400)
PO2 BLDV: 73 MMHG — HIGH (ref 25–45)
POTASSIUM BLDV-SCNC: 4 MMOL/L — SIGNIFICANT CHANGE UP (ref 3.5–5.1)
POTASSIUM SERPL-MCNC: 4 MMOL/L — SIGNIFICANT CHANGE UP (ref 3.5–5.3)
POTASSIUM SERPL-MCNC: 4.1 MMOL/L — SIGNIFICANT CHANGE UP (ref 3.5–5.3)
POTASSIUM SERPL-SCNC: 4 MMOL/L — SIGNIFICANT CHANGE UP (ref 3.5–5.3)
POTASSIUM SERPL-SCNC: 4.1 MMOL/L — SIGNIFICANT CHANGE UP (ref 3.5–5.3)
PROT SERPL-MCNC: 5.6 G/DL — LOW (ref 6–8.3)
PROT SERPL-MCNC: 6 G/DL — SIGNIFICANT CHANGE UP (ref 6–8.3)
PROTHROM AB SERPL-ACNC: 27.5 SEC — HIGH (ref 9.5–13)
RBC # BLD: 3.97 M/UL — LOW (ref 4.2–5.8)
RBC # FLD: 13 % — SIGNIFICANT CHANGE UP (ref 10.3–14.5)
SAO2 % BLDV: 96.9 % — HIGH (ref 67–88)
SODIUM SERPL-SCNC: 137 MMOL/L — SIGNIFICANT CHANGE UP (ref 135–145)
SODIUM SERPL-SCNC: 140 MMOL/L — SIGNIFICANT CHANGE UP (ref 135–145)
T PALLIDUM AB TITR SER: NEGATIVE — SIGNIFICANT CHANGE UP
WBC # BLD: 7.95 K/UL — SIGNIFICANT CHANGE UP (ref 3.8–10.5)
WBC # FLD AUTO: 7.95 K/UL — SIGNIFICANT CHANGE UP (ref 3.8–10.5)

## 2024-05-31 PROCEDURE — 99233 SBSQ HOSP IP/OBS HIGH 50: CPT

## 2024-05-31 PROCEDURE — 99232 SBSQ HOSP IP/OBS MODERATE 35: CPT

## 2024-05-31 PROCEDURE — ZZZZZ: CPT

## 2024-05-31 PROCEDURE — 99232 SBSQ HOSP IP/OBS MODERATE 35: CPT | Mod: GC

## 2024-05-31 RX ORDER — THIAMINE MONONITRATE (VIT B1) 100 MG
100 TABLET ORAL DAILY
Refills: 0 | Status: DISCONTINUED | OUTPATIENT
Start: 2024-05-31 | End: 2024-06-02

## 2024-05-31 RX ORDER — ACETYLCYSTEINE 200 MG/ML
12 VIAL (ML) MISCELLANEOUS EVERY 24 HOURS
Refills: 0 | Status: DISCONTINUED | OUTPATIENT
Start: 2024-05-31 | End: 2024-06-02

## 2024-05-31 RX ORDER — AMLODIPINE BESYLATE 2.5 MG/1
5 TABLET ORAL ONCE
Refills: 0 | Status: COMPLETED | OUTPATIENT
Start: 2024-05-31 | End: 2024-05-31

## 2024-05-31 RX ORDER — FOLIC ACID 0.8 MG
1 TABLET ORAL DAILY
Refills: 0 | Status: DISCONTINUED | OUTPATIENT
Start: 2024-05-31 | End: 2024-06-11

## 2024-05-31 RX ORDER — AMLODIPINE BESYLATE 2.5 MG/1
5 TABLET ORAL DAILY
Refills: 0 | Status: DISCONTINUED | OUTPATIENT
Start: 2024-05-31 | End: 2024-06-01

## 2024-05-31 RX ORDER — DEXMEDETOMIDINE HYDROCHLORIDE IN 0.9% SODIUM CHLORIDE 4 UG/ML
0.2 INJECTION INTRAVENOUS
Qty: 200 | Refills: 0 | Status: DISCONTINUED | OUTPATIENT
Start: 2024-05-31 | End: 2024-06-04

## 2024-05-31 RX ADMIN — AMLODIPINE BESYLATE 5 MILLIGRAM(S): 2.5 TABLET ORAL at 20:26

## 2024-05-31 RX ADMIN — Medication 2 MILLIGRAM(S): at 00:51

## 2024-05-31 RX ADMIN — DEXMEDETOMIDINE HYDROCHLORIDE IN 0.9% SODIUM CHLORIDE 3.98 MICROGRAM(S)/KG/HR: 4 INJECTION INTRAVENOUS at 19:24

## 2024-05-31 RX ADMIN — LACTULOSE 20 GRAM(S): 10 SOLUTION ORAL at 15:41

## 2024-05-31 RX ADMIN — Medication 50 MILLILITER(S): at 15:49

## 2024-05-31 RX ADMIN — HEPARIN SODIUM 5000 UNIT(S): 5000 INJECTION INTRAVENOUS; SUBCUTANEOUS at 05:27

## 2024-05-31 RX ADMIN — DEXMEDETOMIDINE HYDROCHLORIDE IN 0.9% SODIUM CHLORIDE 3.98 MICROGRAM(S)/KG/HR: 4 INJECTION INTRAVENOUS at 23:17

## 2024-05-31 RX ADMIN — HEPARIN SODIUM 5000 UNIT(S): 5000 INJECTION INTRAVENOUS; SUBCUTANEOUS at 18:41

## 2024-05-31 RX ADMIN — DEXMEDETOMIDINE HYDROCHLORIDE IN 0.9% SODIUM CHLORIDE 3.98 MICROGRAM(S)/KG/HR: 4 INJECTION INTRAVENOUS at 00:51

## 2024-05-31 RX ADMIN — Medication 50 MILLILITER(S): at 22:05

## 2024-05-31 RX ADMIN — Medication 2 MILLIGRAM(S): at 18:53

## 2024-05-31 RX ADMIN — Medication 255 MILLIMOLE(S): at 23:00

## 2024-05-31 RX ADMIN — Medication 255 MILLIMOLE(S): at 06:10

## 2024-05-31 RX ADMIN — DEXMEDETOMIDINE HYDROCHLORIDE IN 0.9% SODIUM CHLORIDE 3.98 MICROGRAM(S)/KG/HR: 4 INJECTION INTRAVENOUS at 21:30

## 2024-05-31 RX ADMIN — Medication 50 MILLILITER(S): at 05:27

## 2024-05-31 RX ADMIN — CEFTRIAXONE 100 MILLIGRAM(S): 500 INJECTION, POWDER, FOR SOLUTION INTRAMUSCULAR; INTRAVENOUS at 18:40

## 2024-05-31 RX ADMIN — AMLODIPINE BESYLATE 5 MILLIGRAM(S): 2.5 TABLET ORAL at 23:57

## 2024-05-31 RX ADMIN — PANTOPRAZOLE SODIUM 40 MILLIGRAM(S): 20 TABLET, DELAYED RELEASE ORAL at 18:42

## 2024-05-31 RX ADMIN — Medication 44.17 GRAM(S): at 11:52

## 2024-05-31 RX ADMIN — CHLORHEXIDINE GLUCONATE 1 APPLICATION(S): 213 SOLUTION TOPICAL at 06:11

## 2024-05-31 RX ADMIN — Medication 2 MILLIGRAM(S): at 21:39

## 2024-05-31 RX ADMIN — LACTULOSE 20 GRAM(S): 10 SOLUTION ORAL at 18:42

## 2024-05-31 RX ADMIN — LACTULOSE 20 GRAM(S): 10 SOLUTION ORAL at 22:07

## 2024-05-31 RX ADMIN — Medication 2 MILLIGRAM(S): at 05:20

## 2024-05-31 RX ADMIN — Medication 255 MILLIMOLE(S): at 20:59

## 2024-05-31 NOTE — DIETITIAN INITIAL EVALUATION ADULT - REASON FOR ADMISSION
"38M PMH HTN, ETOH use (daily drink) PSH gastric sleeve, admitted for acute liver failure and Liver transplant eval. "

## 2024-05-31 NOTE — BH CONSULTATION LIAISON ASSESSMENT NOTE - NSBHCHARTREVIEWVS_PSY_A_CORE FT
Vital Signs Last 24 Hrs  T(C): 37 (31 May 2024 15:00), Max: 37 (31 May 2024 07:00)  T(F): 98.6 (31 May 2024 15:00), Max: 98.6 (31 May 2024 07:00)  HR: 81 (31 May 2024 16:00) (76 - 119)  BP: 163/97 (31 May 2024 16:00) (108/60 - 180/96)  BP(mean): 124 (31 May 2024 16:00) (78 - 132)  RR: 19 (31 May 2024 16:00) (16 - 43)  SpO2: 93% (31 May 2024 16:00) (91% - 98%)    Parameters below as of 31 May 2024 05:00  Patient On (Oxygen Delivery Method): nasal cannula  O2 Flow (L/min): 2

## 2024-05-31 NOTE — BH CONSULTATION LIAISON ASSESSMENT NOTE - HPI (INCLUDE ILLNESS QUALITY, SEVERITY, DURATION, TIMING, CONTEXT, MODIFYING FACTORS, ASSOCIATED SIGNS AND SYMPTOMS)
38-year-old, , , male, with PPHx of AUD, with no past psychiatric admissions, with PMH HTN, PSH gastric sleeve, initially admitted to [facility] for jaundice and confusion, found to have elevated transaminases to 4000s with high MELD and withdrawal symptoms, transferred to [facility] for liver transplant workup. Patient seen by transplant psychiatry as part of liver transplant evaluation.      Patient seen at bedside with nursing staff in the room, on exam patient was A/Ox1/2, hypersomnolent, with slurred speech, arousable only to physical stimuli.   Patient noted that he still responding to internal stimuli on exam, noting that he experiencing AH and tactile while on Precedex at this time. Patient noted that he drank before his admission, stating "May 8-9 cups." Patient was unable to further clarify information about alcohol hx at this time. Nursing staff at bedside noted that patient has been agitated, requiring increase in Precedex and multiple doses of PRN Ativan.

## 2024-05-31 NOTE — BH CONSULTATION LIAISON ASSESSMENT NOTE - RISK ASSESSMENT
Risk factors: alcohol/substance use disorder,     Protective factors: no current active SI/HI/I/P or urges to harm self/others, no history of suicide attempts, no prior inpatient psych hospitalizations, positive therapeutic relationships, residential stability

## 2024-05-31 NOTE — DIETITIAN INITIAL EVALUATION ADULT - ADD RECOMMEND
1) Continue regular diet   2) Add Ensure Max x1/day   3) Folic acid, thiamine, multivitamin daily   4) Monitor PO intake, diet tolerance, weight trends, labs, GI function, and skin integrity    Svetlana Roberto MS, RDN, CDN (Teams)

## 2024-05-31 NOTE — PROGRESS NOTE ADULT - SUBJECTIVE AND OBJECTIVE BOX
24 HOUR EVENTS:  - increased lactulose dose  - albumin 50 q8    NEURO  RASS (if intubated): 		CAM ICU (if concern for delirium):  Exam:   Meds: LORazepam   Injectable 2 milliGRAM(s) IV Push every 1 hour PRN CIWA-Ar score 8 or greater      RESPIRATORY  RR: 43 (05-30-24 @ 23:00) (19 - 43)  SpO2: 95% (05-30-24 @ 23:00) (89% - 99%)  Wt(kg): --  Exam:  Mechanical Ventilation:     Meds:     CARDIOVASCULAR  HR: 107 (05-30-24 @ 23:00) (89 - 131)  BP: 153/88 (05-30-24 @ 23:00) (122/66 - 159/91)  BP(mean): 114 (05-30-24 @ 23:00) (89 - 118)  ABP: --  ABP(mean): --  Wt(kg): --  CVP(cm H2O): --  VBG - ( 30 May 2024 11:42 )  pH: 7.48  /  pCO2: 37    /  pO2: 75    / HCO3: 28    / Base Excess: 4.0   /  SaO2: 97.7   Lactate: 1.7                Exam:   Cardiac Rhythm:   Perfusion     [ ]Adequate   [ ]Inadequate  Mentation   [ ]Normal       [ ]Reduced  Extremities  [ ]Warm         [ ]Cool  Volume Status [ ]Hypervolemic [ ]Euvolemic [ ]Hypovolemic  Meds:     GI/NUTRITION  Exam:   Diet:   Meds: lactulose Syrup 20 Gram(s) Oral every 4 hours  pantoprazole  Injectable 40 milliGRAM(s) IV Push every 24 hours      GENITOURINARY  I&O's Detail    05-29 @ 07:01  -  05-30 @ 07:00  --------------------------------------------------------  IN:    IV PiggyBack: 1290 mL    IV PiggyBack: 537.5 mL    Oral Fluid: 420 mL  Total IN: 2247.5 mL    OUT:    Voided (mL): 1800 mL  Total OUT: 1800 mL    Total NET: 447.5 mL      05-30 @ 07:01  -  05-31 @ 00:30  --------------------------------------------------------  IN:    Albumin 25%  -  50 mL: 50 mL    IV PiggyBack: 912.5 mL    IV PiggyBack: 975 mL  Total IN: 1937.5 mL    OUT:    Voided (mL): 800 mL  Total OUT: 800 mL    Total NET: 1137.5 mL          05-30    137  |  100  |  17  ----------------------------<  177<H>  3.1<L>   |  24  |  0.76    Ca    8.3<L>      30 May 2024 11:48  Phos  2.2     05-30  Mg     2.1     05-30    TPro  5.1<L>  /  Alb  2.8<L>  /  TBili  9.8<H>  /  DBili  x   /  AST  775<H>  /  ALT  2235<H>  /  AlkPhos  270<H>  05-30    Meds: albumin human 25% IVPB 50 milliLiter(s) IV Intermittent every 8 hours      HEMATOLOGIC  Meds: heparin   Injectable 5000 Unit(s) SubCutaneous every 12 hours                          12.3   7.86  )-----------( 116      ( 30 May 2024 11:48 )             35.3     PT/INR - ( 29 May 2024 23:00 )   PT: 34.3 sec;   INR: 3.23 ratio         PTT - ( 29 May 2024 23:00 )  PTT:36.5 sec    INFECTIOUS DISEASES  T(C): 36.4 (05-30-24 @ 19:00), Max: 37.2 (05-30-24 @ 07:00)  Wt(kg): --  WBC Count: 7.86 K/uL (05-30 @ 11:48)    Recent Cultures:  Specimen Source: Clean Catch Clean Catch (Midstream), 05-29 @ 17:16; Results   <10,000 CFU/mL Normal Urogenital Aviva; Gram Stain: --; Organism: --  Specimen Source: .Blood Blood-Peripheral, 05-29 @ 13:40; Results   No growth at 24 hours; Gram Stain: --; Organism: --  Specimen Source: .Blood Blood-Peripheral, 05-29 @ 13:30; Results   No growth at 24 hours; Gram Stain: --; Organism: --  Specimen Source: .Blood Blood, 05-29 @ 12:00; Results   No growth at 24 hours; Gram Stain: --; Organism: --  Specimen Source: .Blood Blood, 05-29 @ 11:50; Results   No growth at 24 hours; Gram Stain: --; Organism: --    Meds: cefTRIAXone   IVPB 2000 milliGRAM(s) IV Intermittent every 24 hours      ENDOCRINE  Capillary Blood Glucose    Meds:      24 HOUR EVENTS:  - increased lactulose dose  - albumin 50 q8  - Precedex for agitation    NEURO  RASS (if intubated): 		CAM ICU (if concern for delirium):  Exam:   Meds: LORazepam   Injectable 2 milliGRAM(s) IV Push every 1 hour PRN CIWA-Ar score 8 or greater      RESPIRATORY  RR: 43 (05-30-24 @ 23:00) (19 - 43)  SpO2: 95% (05-30-24 @ 23:00) (89% - 99%)  Wt(kg): --  Exam:  Mechanical Ventilation:     Meds:     CARDIOVASCULAR  HR: 107 (05-30-24 @ 23:00) (89 - 131)  BP: 153/88 (05-30-24 @ 23:00) (122/66 - 159/91)  BP(mean): 114 (05-30-24 @ 23:00) (89 - 118)  ABP: --  ABP(mean): --  Wt(kg): --  CVP(cm H2O): --  VBG - ( 30 May 2024 11:42 )  pH: 7.48  /  pCO2: 37    /  pO2: 75    / HCO3: 28    / Base Excess: 4.0   /  SaO2: 97.7   Lactate: 1.7                Exam:   Cardiac Rhythm:   Perfusion     [ ]Adequate   [ ]Inadequate  Mentation   [ ]Normal       [ ]Reduced  Extremities  [ ]Warm         [ ]Cool  Volume Status [ ]Hypervolemic [ ]Euvolemic [ ]Hypovolemic  Meds:     GI/NUTRITION  Exam:   Diet:   Meds: lactulose Syrup 20 Gram(s) Oral every 4 hours  pantoprazole  Injectable 40 milliGRAM(s) IV Push every 24 hours      GENITOURINARY  I&O's Detail    05-29 @ 07:01  -  05-30 @ 07:00  --------------------------------------------------------  IN:    IV PiggyBack: 1290 mL    IV PiggyBack: 537.5 mL    Oral Fluid: 420 mL  Total IN: 2247.5 mL    OUT:    Voided (mL): 1800 mL  Total OUT: 1800 mL    Total NET: 447.5 mL      05-30 @ 07:01  -  05-31 @ 00:30  --------------------------------------------------------  IN:    Albumin 25%  -  50 mL: 50 mL    IV PiggyBack: 912.5 mL    IV PiggyBack: 975 mL  Total IN: 1937.5 mL    OUT:    Voided (mL): 800 mL  Total OUT: 800 mL    Total NET: 1137.5 mL          05-30    137  |  100  |  17  ----------------------------<  177<H>  3.1<L>   |  24  |  0.76    Ca    8.3<L>      30 May 2024 11:48  Phos  2.2     05-30  Mg     2.1     05-30    TPro  5.1<L>  /  Alb  2.8<L>  /  TBili  9.8<H>  /  DBili  x   /  AST  775<H>  /  ALT  2235<H>  /  AlkPhos  270<H>  05-30    Meds: albumin human 25% IVPB 50 milliLiter(s) IV Intermittent every 8 hours      HEMATOLOGIC  Meds: heparin   Injectable 5000 Unit(s) SubCutaneous every 12 hours                          12.3   7.86  )-----------( 116      ( 30 May 2024 11:48 )             35.3     PT/INR - ( 29 May 2024 23:00 )   PT: 34.3 sec;   INR: 3.23 ratio         PTT - ( 29 May 2024 23:00 )  PTT:36.5 sec    INFECTIOUS DISEASES  T(C): 36.4 (05-30-24 @ 19:00), Max: 37.2 (05-30-24 @ 07:00)  Wt(kg): --  WBC Count: 7.86 K/uL (05-30 @ 11:48)    Recent Cultures:  Specimen Source: Clean Catch Clean Catch (Midstream), 05-29 @ 17:16; Results   <10,000 CFU/mL Normal Urogenital Aviva; Gram Stain: --; Organism: --  Specimen Source: .Blood Blood-Peripheral, 05-29 @ 13:40; Results   No growth at 24 hours; Gram Stain: --; Organism: --  Specimen Source: .Blood Blood-Peripheral, 05-29 @ 13:30; Results   No growth at 24 hours; Gram Stain: --; Organism: --  Specimen Source: .Blood Blood, 05-29 @ 12:00; Results   No growth at 24 hours; Gram Stain: --; Organism: --  Specimen Source: .Blood Blood, 05-29 @ 11:50; Results   No growth at 24 hours; Gram Stain: --; Organism: --    Meds: cefTRIAXone   IVPB 2000 milliGRAM(s) IV Intermittent every 24 hours      ENDOCRINE  Capillary Blood Glucose    Meds:

## 2024-05-31 NOTE — BH CONSULTATION LIAISON ASSESSMENT NOTE - NSBHATTESTCOMMENTATTENDFT_PSY_A_CORE
38-year-old, , , male, with PPHx of AUD, with no past psychiatric admissions, with PMH HTN, PSH gastric sleeve, initially admitted to [facility] for jaundice and confusion, found to have elevated transaminases to 4000s with high MELD and withdrawal symptoms, transferred to [facility] for liver transplant workup. Patient seen by transplant psychiatry as part of liver transplant evaluation. Patient seen with ACP, labs reviewed, agree with above assessment and plan; coordination of care provided with primary team.   --    Patient responding to internal stimuli on exam and received PRN Ativan prior to writer's arrival as per team at bedside patient has been combative and acutely withdrawing despite use of Precedex and Ativan. Patient has received over 10 mg of Ativan use in 24 hours as per current CIWA protocol. Can consider switch to Phenobarbital if patient continues to score high on CIWA and at risk of fulminant DT's as patient actively endorsing AH and tactile hallucinations.

## 2024-05-31 NOTE — BH CONSULTATION LIAISON ASSESSMENT NOTE - NSBHCHARTREVIEWLAB_PSY_A_CORE FT
12.7   7.95  )-----------( 127      ( 31 May 2024 03:30 )             35.7     05-31    140  |  104  |  11  ----------------------------<  168<H>  4.0   |  25  |  0.62    Ca    8.8      31 May 2024 03:30  Phos  2.7     05-31  Mg     2.2     05-31    TPro  5.6<L>  /  Alb  3.2<L>  /  TBili  11.3<H>  /  DBili  x   /  AST  383<H>  /  ALT  1677<H>  /  AlkPhos  267<H>  05-31

## 2024-05-31 NOTE — PROGRESS NOTE ADULT - ASSESSMENT
38M PMH HTN, ETOH use (daily drink) PSH gastric sleeve, initially admitted to [facility] for jaundice and confusion, found to have elevated transaminases to 4000s with high MELD and withdrawal symptoms, transferred to [facility] for liver transplant workup.     [] Acute alc hep  - dc CIWA  - cont NAC gtt (keep until INR < 2.)    [] ETOH Cirrhosis   - LFTs trending down  (775), ALT 1677 (2235), Bili up 11.3 (9.8)  - CT chest/ A/P with diffuse, marked hepatic steatosis   - CT head with no acute intracranial hemorrhage, mass effect or midline shift.  - albumin tid   - labs bid   - incr lact 20mg tid  38M PMH HTN, ETOH use (daily drink) PSH gastric sleeve, initially admitted to [facility] for jaundice and confusion, found to have elevated transaminases to 4000s with high MELD and withdrawal symptoms, transferred to [facility] for liver transplant workup.     [] Acute alc hep  - dc CIWA  - cont NAC gtt (keep until INR < 2.)    [] ETOH Cirrhosis   - LFTs trending down  (775), ALT 1677 (2235), Bili up 11.3 (9.8)  - CT chest/ A/P with diffuse, marked hepatic steatosis   - CT head with no acute intracranial hemorrhage, mass effect or midline shift.  - albumin tid   - labs bid

## 2024-05-31 NOTE — DIETITIAN INITIAL EVALUATION ADULT - PERTINENT LABORATORY DATA
05-31    140  |  104  |  11  ----------------------------<  168<H>  4.0   |  25  |  0.62    Ca    8.8      31 May 2024 03:30  Phos  2.7     05-31  Mg     2.2     05-31    TPro  5.6<L>  /  Alb  3.2<L>  /  TBili  11.3<H>  /  DBili  x   /  AST  383<H>  /  ALT  1677<H>  /  AlkPhos  267<H>  05-31  A1C with Estimated Average Glucose Result: 5.3 % (05-29-24 @ 17:13)

## 2024-05-31 NOTE — PROGRESS NOTE ADULT - ASSESSMENT
ASSESSMENT  38M PMH HTN, ETOH use (daily drink) PSH gastric sleeve, admitted for acute liver failure and Liver transplant eval.     PLAN  NEURO  - Pain: None  - Sedation: None  - Lorazepam PRN for CIWA  - Trend ammonia  - Lactulose for hepatic encephalopathy     RESP  - Maintain O2 saturation >92%  - AM CXRs    CV  - MAP goal >65  - Pressors: None  - 5/29 TTE ordered    GI/NUTRITION  - Diet: Reg  - Protonix IV daily  - Lactulose  - Acetylcysteine gtt     RENAL/  - Voids  - albumin 50 q8    HEME  - DVT ppx: SQH    ID  - ABX: CTX (5/29-)  - 5/29: BCx - results pending  - 5/29: +UA, UCx pending  ENDO  - Monitor blood glucose     LINES  - PIVs    CODE: FULL  DISPO: SICU    ASSESSMENT  38M PMH HTN, ETOH use (daily drink) PSH gastric sleeve, admitted for acute liver failure and Liver transplant eval.     PLAN  NEURO  - Pain: None  - Sedation: Precedex  - Lorazepam PRN for CIWA  - Trend ammonia  - Lactulose for hepatic encephalopathy     RESP  - Maintain O2 saturation >92%  - AM CXRs    CV  - MAP goal >65  - Pressors: None  - 5/29 TTE ordered    GI/NUTRITION  - Diet: Reg  - Protonix IV daily  - Lactulose  - Acetylcysteine gtt     RENAL/  - Voids  - albumin 50 q8    HEME  - DVT ppx: SQH    ID  - ABX: CTX (5/29-)  - 5/29: BCx - results pending  - 5/29: +UA, UCx pending  ENDO  - Monitor blood glucose     LINES  - PIVs    CODE: FULL  DISPO: SICU

## 2024-05-31 NOTE — PROGRESS NOTE ADULT - ASSESSMENT
38M PMH HTN, ETOH use (daily drink) PSH gastric sleeve, initially admitted to [facility] for jaundice and confusion, found to have elevated transaminases to 4000s with high MELD and withdrawal symptoms, transferred to [facility] for liver transplant workup.     #alcohol use disorder   #cirrhosis, likely 2/2 liver disease (MELD 27)   - hepatitis B surface antibody, hepatitis B surface antigen, hepatitis core antibody, hepatitis C NR   - no known endoscopy   - CT chest/ A/P with diffuse, marked hepatic steatosis   - CT head with no acute intracranial hemorrhage, mass effect or midline shift.  - MERLY negative, anti-mitochondrial antibody <1:20, anti-smooth muscle antibody <1:20, immunoglobulins (IgG 1022, IgM 122, IgA quantitative 502) for autoimmune etiologies     Recommendations:   - NAC gtt   - CIWA (please give symptom triggered ativan vs precedex, please avoid librium and phenobarbital given   - daily MELD labs   - ceftriaxone (empiric given high MELD)   - infectious workup, including blood cultures  - followup alpha-1 anti-trypsin (phenotype), ceruloplasma, AFP 6  - liver transplant workup     All recommendations are tentative until note is attested by attending.     Marilyn Naylor, PGY-5  Gastroenterology/Hepatology Fellow  Available on Microsoft Teams  [phone] ([facility] Short Range Pager)  [phone] ([facility] Long Range Pager)    On Weekends/Holidays (All day) and Weekdays after 5 PM to 8AM:  For non-urgent consults, please email [email or web address] or [email or web address]  For emergent consults, please contact on call GI team.  See Amion schedule ([facility]), Everyone Counts paging system ([facility]), or call hospital  ([facility])

## 2024-05-31 NOTE — DIETITIAN INITIAL EVALUATION ADULT - PERTINENT MEDS FT
MEDICATIONS  (STANDING):  acetylcysteine IVPB 12 Gram(s) IV Intermittent every 24 hours  albumin human 25% IVPB 50 milliLiter(s) IV Intermittent every 8 hours  cefTRIAXone   IVPB 2000 milliGRAM(s) IV Intermittent every 24 hours  chlorhexidine 2% Cloths 1 Application(s) Topical <User Schedule>  dexMEDEtomidine Infusion 0.2 MICROgram(s)/kG/Hr (3.98 mL/Hr) IV Continuous <Continuous>  heparin   Injectable 5000 Unit(s) SubCutaneous every 12 hours  lactulose Syrup 20 Gram(s) Oral every 4 hours  pantoprazole  Injectable 40 milliGRAM(s) IV Push every 24 hours    MEDICATIONS  (PRN):  LORazepam   Injectable 2 milliGRAM(s) IV Push every 1 hour PRN CIWA-Ar score 8 or greater

## 2024-05-31 NOTE — BH CONSULTATION LIAISON ASSESSMENT NOTE - NSBHCHARTREVIEWINVESTIGATE_PSY_A_CORE FT
[facility] 05/29       ACC: [id number] EXAM:  CT BRAIN   ORDERED BY: DANIEL HUERTAS     PROCEDURE DATE:  05/29/2024          INTERPRETATION:  History: ams.    CT head 5/29/2024    Thin axial sections through the head obtained from skull base to vertex.   Coronal and sagittal reformatted images provided.    Comparison made to previous CT head 10/20/2021    The ventricles are normal in size and configuration. The gray-white   matter differentiation is preserved. There is no midline shift or mass   effect. There is no acute intracranial hemorrhage. There are no abnormal   extra-axial collections.    The calvarium is intact. The visualized orbits are unremarkable. The   visualized paranasal sinuses and tympanomastoid cavities are   well-aerated. There is a left nostril ring partially visualized.    IMPRESSION:    No acute intracranial hemorrhage, mass effect or midline shift.    --- End of Report ---

## 2024-05-31 NOTE — DIETITIAN INITIAL EVALUATION ADULT - REASON INDICATOR FOR ASSESSMENT
Consult: frailty assessment; liver transplant eval   Sources: Electronic Medical Record, Team (Rounds), Patient - illogical

## 2024-05-31 NOTE — PROGRESS NOTE ADULT - SUBJECTIVE AND OBJECTIVE BOX
Transplant Surgery - Multidisciplinary Rounds  --------------------------------------------------------------  Present:   Patient seen and examined with multidisciplinary Transplant team including Surgeon: Dr Amaya, Hepatologist: Dr Peterson,  Pharmacist: CROW Farmer, and beside RN during AM rounds. Disciplines not in attendance will be notified of the plan.     Interval Events:   - LFTs trending down AST 1155 (1806), ALT 2623 (2982)  - Chetna on precedex    Potential Discharge date: TBD  Education:  Medications  Plan of care:  See Below        MEDICATIONS  (STANDING):  acetylcysteine IVPB 12 Gram(s) IV Intermittent every 24 hours  albumin human 25% IVPB 50 milliLiter(s) IV Intermittent every 8 hours  cefTRIAXone   IVPB 2000 milliGRAM(s) IV Intermittent every 24 hours  chlorhexidine 2% Cloths 1 Application(s) Topical <User Schedule>  dexMEDEtomidine Infusion 0.2 MICROgram(s)/kG/Hr (3.98 mL/Hr) IV Continuous <Continuous>  heparin   Injectable 5000 Unit(s) SubCutaneous every 12 hours  lactulose Syrup 20 Gram(s) Oral every 4 hours  pantoprazole  Injectable 40 milliGRAM(s) IV Push every 24 hours    MEDICATIONS  (PRN):  LORazepam   Injectable 2 milliGRAM(s) IV Push every 1 hour PRN CIWA-Ar score 8 or greater      PAST MEDICAL & SURGICAL HISTORY:  Hypertension, unspecified type      H/O gastric sleeve          Vital Signs Last 24 Hrs  T(C): 37 (31 May 2024 07:00), Max: 37.2 (30 May 2024 11:00)  T(F): 98.6 (31 May 2024 07:00), Max: 98.9 (30 May 2024 11:00)  HR: 81 (31 May 2024 08:00) (76 - 119)  BP: 145/83 (31 May 2024 08:00) (122/66 - 180/96)  BP(mean): 107 (31 May 2024 08:00) (89 - 132)  RR: 17 (31 May 2024 08:00) (17 - 43)  SpO2: 95% (31 May 2024 08:00) (91% - 98%)    Parameters below as of 31 May 2024 05:00  Patient On (Oxygen Delivery Method): nasal cannula  O2 Flow (L/min): 2      I&O's Summary    30 May 2024 07:01  -  31 May 2024 07:00  --------------------------------------------------------  IN: 2995.7 mL / OUT: 800 mL / NET: 2195.7 mL                              12.7   7.95  )-----------( 127      ( 31 May 2024 03:30 )             35.7     05-31    140  |  104  |  11  ----------------------------<  168<H>  4.0   |  25  |  0.62    Ca    8.8      31 May 2024 03:30  Phos  2.7     05-31  Mg     2.2     05-31    TPro  5.6<L>  /  Alb  3.2<L>  /  TBili  11.3<H>  /  DBili  x   /  AST  383<H>  /  ALT  1677<H>  /  AlkPhos  267<H>  05-31        Review of systems  unable to obtain, AMS      PHYSICAL EXAM:  GENERAL: Patient appears confused  HEENT:  NCAT, + scleral icterus  CHEST: no resp distress  HEART:  RRR  ABDOMEN:  Soft, non-tender, non-distended, no masses  EXTREMITIES:  No cyanosis, clubbing, or edema  SKIN:  No rash/erythema/ecchymoses/petechiae/wounds/abscess/warm/dry  NEURO:  Alert and oriented x 1

## 2024-05-31 NOTE — PROGRESS NOTE ADULT - ASSESSMENT
38M PMH HTN, ETOH use (daily drink) PSH gastric sleeve, initially admitted to [facility] for jaundice and confusion, found to have elevated transaminases to 4000s with high MELD and withdrawal symptoms, transferred to [facility] for liver transplant workup.     MRSA/MSSA nasal PCR (May 29) negative  Urine culture (May 29) no growth  Blood cultures (May 29) no growth to date    CT neck (May 29) Enlarged palatine tonsils without hyperemia or surrounding infiltrative changes.  CT chest (May 29) no focal consolidation  CT abdomen pelvis (May 29) Diffuse, marked hepatic steatosis, Question of mild gallbladder wall thickening    #Acute liver injury, transaminitis  Hepatitis A IgM (May 29) negative  Hepatitis B DNA by PCR (May 29) negative  Hepatitis C RNA (May 29) negative  CMV by PCR (May 29) negative  -- Recommend HSV 1/2 serum PCR (ordered)  -- Recommend adenovirus serum PCR (ordered)  -- Recommend varicella DNA PCR (ordered)  -- Recommend HIV viral load (ordered)  -- Can continue empiric ceftriaxone for now pending culture workup    #Preliver transplant evaluation  COVID19 Ortiz Antibody positive  HAV IgG positive  HBVs Ab Negative, HBVs Ag Negative, HBVc Ab negative, HBV PCR Negative  HCV Ab negative, HCV PCR Negative  HSV 1 IgG positive  EBV IgG positive  CMV IgG positive  VZV IgG positive  Measles IgG negative, Mumps IgG negative  HIV Ag/Ab by CMIA negative  Toxoplasma IgG positive  --Ideally would wait for further culture data and QuantiFERON gold but given unrevealing CT imaging no absolute ID contraindication to liver transplantation if urgently indicated  --Recommend COVID19 Nucleocapsid Antibody (ordered)  --Recommend Coccidioides antibody (ordered)  --Recommend Trypanosoma cruzi Ab (ordered)  --Recommend rubella IgG (ordered)  --Recommend HSV 2 IgG  -- Follow-up on Strongyloides Ab   -- Follow-up on Quantiferon Gold  -- Follow-up on syphilis Screen  -- Follow-up on Strongyloides Ab    #Encounter to Vaccinate Patient - Will defer vaccinations until further clinical stability given acuity of presentation  COVID19: Would benefit from COVID19 6350-1253 Vaccine Dose  Influenza: Will require with next season  Pneumococcal: Would benefit from PCV20  HAV: Immune, would not require further vaccination  HBV: Would benefit from Heplisav vaccination  MMR: Not mumps or rubeola immune, ideally would require dose of MMR but this would be a live vaccine  Varicella: Immune, would not require further vaccination  Shingles: Will require Shingrix  Tdap: Will require Tdap

## 2024-05-31 NOTE — BH CONSULTATION LIAISON ASSESSMENT NOTE - SUMMARY
38-year-old, , , male, with PPHx of AUD, with no past psychiatric admissions, with PMH HTN, PSH gastric sleeve, initially admitted to [facility] for jaundice and confusion, found to have elevated transaminases to 4000s with high MELD and withdrawal symptoms, transferred to [facility] for liver transplant workup. Patient seen by transplant psychiatry as part of liver transplant evaluation.      Patient seen at bedside with nursing staff in the room, on exam patient was A/Ox1/2, hypersomnolent, with slurred speech, arousable only to physical stimuli.   Patient noted that he still responding to internal stimuli on exam, noting that he experiencing AH and tactile while on Precedex at this time, and is still actively withdrawing from alcohol. Nursing staff at bedside noted that patient has been agitated, requiring increase in Precedex and multiple doses of PRN Ativan, and has received 10mg of Ativan in a 24 hour period at this time. Patient may benefit from phenobarbital use for acute withdrawal.     Plan  -SIPAT pending improvement in mental status and ability to participate in exam   -Patient may benefit from phenobarbital taper to better address   -Defer observational status to the primary team   -Serial Peth monitoring   -C/W CIWA Taper at this time  38-year-old, , , male, with PPHx of AUD, with no past psychiatric admissions, with PMH HTN, PSH gastric sleeve, initially admitted to [facility] for jaundice and confusion, found to have elevated transaminases to 4000s with high MELD and withdrawal symptoms, transferred to [facility] for liver transplant workup. Patient seen by transplant psychiatry as part of liver transplant evaluation.      Patient seen at bedside with nursing staff in the room, on exam patient was A/Ox1/2, hypersomnolent, with slurred speech, arousable only to physical stimuli.   Patient noted that he still responding to internal stimuli on exam, noting that he experiencing AH and tactile while on Precedex at this time, and is still actively withdrawing from alcohol. Nursing staff at bedside noted that patient has been agitated, requiring increase in Precedex and multiple doses of PRN Ativan, and has received 10mg of Ativan in a 24 hour period at this time. Patient may benefit from phenobarbital use for acute withdrawal.     Plan  -CIWA standing and PRN w/ Ativan as per primary team  -Can consider switch to use of phenobarbital in lieu of Ativan & Precedex  use w/ consistently elevated CIWA scores   -SIPAT pending further education about transplant process   -High dose  IV Thiamine supplementation  -B12, folate, TSH w/ FT4, PETH monitoring  -Transplant psychiatry will continue to monitor and reassess

## 2024-05-31 NOTE — BH CONSULTATION LIAISON ASSESSMENT NOTE - CURRENT MEDICATION
MEDICATIONS  (STANDING):  acetylcysteine IVPB 12 Gram(s) IV Intermittent every 24 hours  albumin human 25% IVPB 50 milliLiter(s) IV Intermittent every 8 hours  cefTRIAXone   IVPB 2000 milliGRAM(s) IV Intermittent every 24 hours  chlorhexidine 2% Cloths 1 Application(s) Topical <User Schedule>  dexMEDEtomidine Infusion 0.2 MICROgram(s)/kG/Hr (3.98 mL/Hr) IV Continuous <Continuous>  folic acid 1 milliGRAM(s) Oral daily  heparin   Injectable 5000 Unit(s) SubCutaneous every 12 hours  lactulose Syrup 20 Gram(s) Oral every 4 hours  multivitamin 1 Tablet(s) Oral daily  pantoprazole  Injectable 40 milliGRAM(s) IV Push every 24 hours  thiamine 100 milliGRAM(s) Oral daily    MEDICATIONS  (PRN):  LORazepam   Injectable 2 milliGRAM(s) IV Push every 1 hour PRN CIWA-Ar score 8 or greater

## 2024-05-31 NOTE — DIETITIAN INITIAL EVALUATION ADULT - OTHER INFO
GI/Intake:   -Last BM documented ; bowel regimen ordered (Lactulose); Protonix ordered   -Hx of gastric sleeve   -Presenting with acute liver injury with hx of ETOH use  -[facility] protocol     Renal:   -Albumin ordered     Neuro:  -Precedex ordered for sedation   -Hepatic encephalopathy     Weight Hx:   -Current dosin pounds   -No additional weights noted     LIVER TRANSPLANT EVAL:   -BMI: 23.1 - richard;  -HbA1c: 5.3% - no DM detected   -Frailty: [pending PT evaluation]   -MELD: 27     (Unable to provide education at this time; will review the following when applicable)   Education:    -Review post-transplant nutrition therapy and food safety guidelines for transplant recipients   -Review recommendations to avoid grapefruit, pomegranate and star fruit while taking immunosuppressant medication.    -Review recommendations for moderate intake of sodium and carbohydrates with transplant medications.      Pt with no known nutrition contraindication to transplant.     Nutrition assessment communicated with Transplant Hepatology Team and outpatient Transplant RDs 
No

## 2024-05-31 NOTE — PROGRESS NOTE ADULT - SUBJECTIVE AND OBJECTIVE BOX
Follow Up: Liver injury, acute     Interval History/ROS: Seen in SICU     REVIEW OF SYSTEMS  Unchanged prior     Allergies  No Known Allergies    ANTIMICROBIALS:    cefTRIAXone   IVPB 2000 every 24 hours    OTHER MEDS: MEDICATIONS  (STANDING):  dexMEDEtomidine Infusion 0.2 <Continuous>  heparin   Injectable 5000 every 12 hours  lactulose Syrup 20 every 4 hours  LORazepam   Injectable 2 every 1 hour PRN  pantoprazole  Injectable 40 every 24 hours    Vital Signs Last 24 Hrs  T(F): 98.6 (05-31-24 @ 07:00), Max: 99.1 (05-29-24 @ 13:43)    Vital Signs Last 24 Hrs  HR: 81 (05-31-24 @ 08:00) (76 - 119)  BP: 145/83 (05-31-24 @ 08:00) (122/66 - 180/96)  RR: 17 (05-31-24 @ 08:00)  SpO2: 95% (05-31-24 @ 08:00) (91% - 98%)  Wt(kg): --    EXAM:  General: NAD   HEENT: NCAT  CV: S1+S2  Lungs: No respiratory distress  Abd: Soft  Ext: No cyanosis  Neuro: On Precedex    Skin: No rash   + tattoos     Labs:                        12.7   7.95  )-----------( 127      ( 31 May 2024 03:30 )             35.7     05-31    140  |  104  |  11  ----------------------------<  168<H>  4.0   |  25  |  0.62    Ca    8.8      31 May 2024 03:30  Phos  2.7     05-31  Mg     2.2     05-31    TPro  5.6<L>  /  Alb  3.2<L>  /  TBili  11.3<H>  /  DBili  x   /  AST  383<H>  /  ALT  1677<H>  /  AlkPhos  267<H>  05-31    WBC Trend:  WBC Count: 7.95 (05-31-24 @ 03:30)  WBC Count: 7.86 (05-30-24 @ 11:48)  WBC Count: 8.18 (05-29-24 @ 23:00)  WBC Count: 9.10 (05-29-24 @ 13:56)    Creatine Trend:  Creatinine: 0.62 (05-31)  Creatinine: 0.76 (05-30)  Creatinine: 0.88 (05-30)  Creatinine: 0.87 (05-29)    Liver Biochemical Testing Trend:  Alanine Aminotransferase (ALT/SGPT): 1677 *H* (05-31)  Alanine Aminotransferase (ALT/SGPT): 2235 *H* (05-30)  Alanine Aminotransferase (ALT/SGPT): 2623 *H* (05-30)  Alanine Aminotransferase (ALT/SGPT): 2982 *H* (05-29)  Alanine Aminotransferase (ALT/SGPT): 3746 *H* (05-29)  Aspartate Aminotransferase (AST/SGOT): 383 (05-31-24 @ 03:30)  Aspartate Aminotransferase (AST/SGOT): 775 (05-30-24 @ 11:48)  Aspartate Aminotransferase (AST/SGOT): 1155 (05-30-24 @ 06:53)  Aspartate Aminotransferase (AST/SGOT): 1806 (05-29-24 @ 23:00)  Aspartate Aminotransferase (AST/SGOT): 3428 (05-29-24 @ 13:56)  Bilirubin Total: 11.3 (05-31)  Bilirubin Total: 9.8 (05-30)  Bilirubin Total: 10.6 (05-30)  Bilirubin Total: 9.8 (05-29)  Bilirubin Total: 10.0 (05-29)    Urinalysis Basic - ( 31 May 2024 03:30 )    Color: x / Appearance: x / SG: x / pH: x  Gluc: 168 mg/dL / Ketone: x  / Bili: x / Urobili: x   Blood: x / Protein: x / Nitrite: x   Leuk Esterase: x / RBC: x / WBC x   Sq Epi: x / Non Sq Epi: x / Bacteria: x    MICROBIOLOGY:    MRSA PCR Result.: NotDetec (05-29-24 @ 17:16)    Culture - Urine (collected 29 May 2024 17:16)  Source: Clean Catch Clean Catch (Midstream)  Final Report:    <10,000 CFU/mL Normal Urogenital Aviva    Culture - Blood (collected 29 May 2024 13:40)  Source: .Blood Blood-Peripheral  Preliminary Report:    No growth at 24 hours    Culture - Blood (collected 29 May 2024 13:30)  Source: .Blood Blood-Peripheral  Preliminary Report:    No growth at 24 hours    Culture - Blood (collected 29 May 2024 12:00)  Source: .Blood Blood  Preliminary Report:    No growth at 24 hours    Culture - Blood (collected 29 May 2024 11:50)  Source: .Blood Blood  Preliminary Report:    No growth at 24 hours    HIV-1/2 Combo Result: Nonreact (05-29-24 @ 17:13)    CMV IgG Interpretation: Positive (05-29-24 @ 17:13)  Cytomegalovirus By PCR: NotDetec IU/mL (05-29-24 @ 17:13)  Treponema Pallidum Antibody Interpretation: Negative (05-29-24 @ 17:13)  Toxoplasma IgG Interpretation: Positive (05-29-24 @ 17:13)    COVID-19 Ortiz Domain AB Interp: Positive (05-29-24 @ 17:13)    Procalcitonin: 7.91 (05-29)  Procalcitonin: 9.32 (05-29)    C-Reactive Protein: 55 (05-29)    Troponin T, High Sensitivity Result: 34 (05-29)  Troponin T, High Sensitivity Result: 40 (05-29)    Blood Gas Venous - Lactate: 1.1 (05-31 @ 03:12)  Blood Gas Venous - Lactate: 1.7 (05-30 @ 11:42)  Blood Gas Venous - Lactate: 1.6 (05-29 @ 23:00)  Blood Gas Venous - Lactate: 3.0 (05-29 @ 13:40)    RADIOLOGY:  imaging below personally reviewed    < from: Xray Chest 1 View- PORTABLE-Urgent (05.29.24 @ 14:40) >  IMPRESSION:  Clear lungs.    < end of copied text >

## 2024-05-31 NOTE — BH CONSULTATION LIAISON ASSESSMENT NOTE - NSBHATTESTAPPAMEND_PSY_A_CORE
I have personally seen and examined this patient. I fully participated in the care of this patient. I have made amendments to the documentation where appropriate and otherwise agree with the history, physical exam, and plan as documented by the JEAN

## 2024-05-31 NOTE — PROGRESS NOTE ADULT - SUBJECTIVE AND OBJECTIVE BOX
Gastroenterology/Hepatology Progress Note    Interval Events:   - agitated ON, on precedex, ativan at 5am (last given)   - currently sedated, on 1:1   - no other acute ON events     Allergies:  No Known Allergies      Hospital Medications:  acetylcysteine IVPB 12 Gram(s) IV Intermittent every 24 hours  albumin human 25% IVPB 50 milliLiter(s) IV Intermittent every 8 hours  cefTRIAXone   IVPB 2000 milliGRAM(s) IV Intermittent every 24 hours  chlorhexidine 2% Cloths 1 Application(s) Topical <User Schedule>  dexMEDEtomidine Infusion 0.2 MICROgram(s)/kG/Hr IV Continuous <Continuous>  heparin   Injectable 5000 Unit(s) SubCutaneous every 12 hours  lactulose Syrup 20 Gram(s) Oral every 4 hours  LORazepam   Injectable 2 milliGRAM(s) IV Push every 1 hour PRN  pantoprazole  Injectable 40 milliGRAM(s) IV Push every 24 hours      ROS: 14 point ROS negative unless otherwise state in subjective    PHYSICAL EXAM:   Vital Signs:  Vital Signs Last 24 Hrs  T(C): 37 (31 May 2024 07:00), Max: 37.2 (30 May 2024 11:00)  T(F): 98.6 (31 May 2024 07:00), Max: 98.9 (30 May 2024 11:00)  HR: 81 (31 May 2024 08:00) (76 - 119)  BP: 145/83 (31 May 2024 08:00) (122/66 - 180/96)  BP(mean): 107 (31 May 2024 08:00) (89 - 132)  RR: 17 (31 May 2024 08:00) (17 - 43)  SpO2: 95% (31 May 2024 08:00) (91% - 98%)    Parameters below as of 31 May 2024 05:00  Patient On (Oxygen Delivery Method): nasal cannula  O2 Flow (L/min): 2    Daily     Daily     GENERAL: Patient somnolent   HEENT:  NCAT, + scleral icterus  CHEST: no resp distress  HEART:  RRR  ABDOMEN:  Soft, non-tender, non-distended, no masses  EXTREMITIES:  No cyanosis, clubbing, or edema  SKIN:  No rash/erythema/ecchymoses/petechiae/wounds/abscess/warm/dry  NEURO:  Alert and oriented x 0    LABS:                        12.7   7.95  )-----------( 127      ( 31 May 2024 03:30 )             35.7     Mean Cell Volume: 89.9 fl (05-31-24 @ 03:30)    05-31    140  |  104  |  11  ----------------------------<  168<H>  4.0   |  25  |  0.62    Ca    8.8      31 May 2024 03:30  Phos  2.7     05-31  Mg     2.2     05-31    TPro  5.6<L>  /  Alb  3.2<L>  /  TBili  11.3<H>  /  DBili  x   /  AST  383<H>  /  ALT  1677<H>  /  AlkPhos  267<H>  05-31    LIVER FUNCTIONS - ( 31 May 2024 03:30 )  Alb: 3.2 g/dL / Pro: 5.6 g/dL / ALK PHOS: 267 U/L / ALT: 1677 U/L / AST: 383 U/L / GGT: x           PT/INR - ( 31 May 2024 03:30 )   PT: 27.5 sec;   INR: 2.70 ratio         PTT - ( 31 May 2024 03:30 )  PTT:30.9 sec  Urinalysis Basic - ( 31 May 2024 03:30 )    Color: x / Appearance: x / SG: x / pH: x  Gluc: 168 mg/dL / Ketone: x  / Bili: x / Urobili: x   Blood: x / Protein: x / Nitrite: x   Leuk Esterase: x / RBC: x / WBC x   Sq Epi: x / Non Sq Epi: x / Bacteria: x      Amylase Serum--      Lipase serum--       [id number]        Imaging:

## 2024-06-01 LAB
ALBUMIN SERPL ELPH-MCNC: 3.6 G/DL — SIGNIFICANT CHANGE UP (ref 3.3–5)
ALBUMIN SERPL ELPH-MCNC: 3.9 G/DL — SIGNIFICANT CHANGE UP (ref 3.3–5)
ALP SERPL-CCNC: 260 U/L — HIGH (ref 40–120)
ALP SERPL-CCNC: 275 U/L — HIGH (ref 40–120)
ALT FLD-CCNC: 1045 U/L — HIGH (ref 10–45)
ALT FLD-CCNC: 863 U/L — HIGH (ref 10–45)
AMMONIA BLD-MCNC: 62 UMOL/L — HIGH (ref 11–55)
AMMONIA BLD-MCNC: 75 UMOL/L — HIGH (ref 11–55)
ANION GAP SERPL CALC-SCNC: 14 MMOL/L — SIGNIFICANT CHANGE UP (ref 5–17)
ANION GAP SERPL CALC-SCNC: 14 MMOL/L — SIGNIFICANT CHANGE UP (ref 5–17)
APTT BLD: 28 SEC — SIGNIFICANT CHANGE UP (ref 24.5–35.6)
AST SERPL-CCNC: 77 U/L — HIGH (ref 10–40)
AST SERPL-CCNC: 97 U/L — HIGH (ref 10–40)
BILIRUB SERPL-MCNC: 16 MG/DL — HIGH (ref 0.2–1.2)
BILIRUB SERPL-MCNC: 16.1 MG/DL — HIGH (ref 0.2–1.2)
BUN SERPL-MCNC: 11 MG/DL — SIGNIFICANT CHANGE UP (ref 7–23)
BUN SERPL-MCNC: 9 MG/DL — SIGNIFICANT CHANGE UP (ref 7–23)
CALCIUM SERPL-MCNC: 10 MG/DL — SIGNIFICANT CHANGE UP (ref 8.4–10.5)
CALCIUM SERPL-MCNC: 9.3 MG/DL — SIGNIFICANT CHANGE UP (ref 8.4–10.5)
CHLORIDE SERPL-SCNC: 104 MMOL/L — SIGNIFICANT CHANGE UP (ref 96–108)
CHLORIDE SERPL-SCNC: 105 MMOL/L — SIGNIFICANT CHANGE UP (ref 96–108)
CO2 SERPL-SCNC: 19 MMOL/L — LOW (ref 22–31)
CO2 SERPL-SCNC: 20 MMOL/L — LOW (ref 22–31)
COVID-19 NUCLEOCAPSID GAM AB INTERP: POSITIVE
COVID-19 NUCLEOCAPSID TOTAL GAM ANTIBODY RESULT: 8.32 INDEX — HIGH
CREAT SERPL-MCNC: 0.46 MG/DL — LOW (ref 0.5–1.3)
CREAT SERPL-MCNC: 0.53 MG/DL — SIGNIFICANT CHANGE UP (ref 0.5–1.3)
EGFR: 132 ML/MIN/1.73M2 — SIGNIFICANT CHANGE UP
EGFR: 138 ML/MIN/1.73M2 — SIGNIFICANT CHANGE UP
GAMMA INTERFERON BACKGROUND BLD IA-ACNC: 0.03 IU/ML — SIGNIFICANT CHANGE UP
GLUCOSE BLDC GLUCOMTR-MCNC: 110 MG/DL — HIGH (ref 70–99)
GLUCOSE BLDC GLUCOMTR-MCNC: 150 MG/DL — HIGH (ref 70–99)
GLUCOSE BLDC GLUCOMTR-MCNC: 177 MG/DL — HIGH (ref 70–99)
GLUCOSE SERPL-MCNC: 155 MG/DL — HIGH (ref 70–99)
GLUCOSE SERPL-MCNC: 172 MG/DL — HIGH (ref 70–99)
HCT VFR BLD CALC: 39.8 % — SIGNIFICANT CHANGE UP (ref 39–50)
HGB BLD-MCNC: 14.1 G/DL — SIGNIFICANT CHANGE UP (ref 13–17)
HSV DNA1: SIGNIFICANT CHANGE UP
HSV DNA2: SIGNIFICANT CHANGE UP
HSV1 DNA BLD QL NAA+PROBE: SIGNIFICANT CHANGE UP
HSV1 IGG SER-ACNC: 26.7 INDEX — HIGH
HSV1 IGG SERPL QL IA: POSITIVE
HSV2 DNA BLD QL NAA+PROBE: SIGNIFICANT CHANGE UP
HSV2 IGG FLD-ACNC: 0.86 INDEX — SIGNIFICANT CHANGE UP
HSV2 IGG SERPL QL IA: NEGATIVE — SIGNIFICANT CHANGE UP
INR BLD: 2.09 RATIO — HIGH (ref 0.85–1.18)
M TB IFN-G BLD-IMP: ABNORMAL
M TB IFN-G CD4+ BCKGRND COR BLD-ACNC: 0 IU/ML — SIGNIFICANT CHANGE UP
M TB IFN-G CD4+CD8+ BCKGRND COR BLD-ACNC: 0 IU/ML — SIGNIFICANT CHANGE UP
MAGNESIUM SERPL-MCNC: 2.2 MG/DL — SIGNIFICANT CHANGE UP (ref 1.6–2.6)
MAGNESIUM SERPL-MCNC: 2.2 MG/DL — SIGNIFICANT CHANGE UP (ref 1.6–2.6)
MCHC RBC-ENTMCNC: 32.1 PG — SIGNIFICANT CHANGE UP (ref 27–34)
MCHC RBC-ENTMCNC: 35.4 GM/DL — SIGNIFICANT CHANGE UP (ref 32–36)
MCV RBC AUTO: 90.7 FL — SIGNIFICANT CHANGE UP (ref 80–100)
NRBC # BLD: 0 /100 WBCS — SIGNIFICANT CHANGE UP (ref 0–0)
PETH 16:0/18:1: >400 NG/ML — HIGH
PETH 16:0/18:2: >400 NG/ML — HIGH
PETH COMMENTS: SIGNIFICANT CHANGE UP
PHOSPHATE SERPL-MCNC: 1.5 MG/DL — LOW (ref 2.5–4.5)
PHOSPHATE SERPL-MCNC: 1.8 MG/DL — LOW (ref 2.5–4.5)
PLATELET # BLD AUTO: 170 K/UL — SIGNIFICANT CHANGE UP (ref 150–400)
POTASSIUM SERPL-MCNC: 3.6 MMOL/L — SIGNIFICANT CHANGE UP (ref 3.5–5.3)
POTASSIUM SERPL-MCNC: 3.7 MMOL/L — SIGNIFICANT CHANGE UP (ref 3.5–5.3)
POTASSIUM SERPL-SCNC: 3.6 MMOL/L — SIGNIFICANT CHANGE UP (ref 3.5–5.3)
POTASSIUM SERPL-SCNC: 3.7 MMOL/L — SIGNIFICANT CHANGE UP (ref 3.5–5.3)
PROCALCITONIN SERPL-MCNC: 1.95 NG/ML — HIGH (ref 0.02–0.1)
PROT SERPL-MCNC: 6 G/DL — SIGNIFICANT CHANGE UP (ref 6–8.3)
PROT SERPL-MCNC: 6.6 G/DL — SIGNIFICANT CHANGE UP (ref 6–8.3)
PROTHROM AB SERPL-ACNC: 21.5 SEC — HIGH (ref 9.5–13)
QUANT TB PLUS MITOGEN MINUS NIL: 0 IU/ML — SIGNIFICANT CHANGE UP
RBC # BLD: 4.39 M/UL — SIGNIFICANT CHANGE UP (ref 4.2–5.8)
RBC # FLD: 13.7 % — SIGNIFICANT CHANGE UP (ref 10.3–14.5)
RUBV IGG SER-ACNC: 1.4 INDEX — SIGNIFICANT CHANGE UP
RUBV IGG SER-IMP: POSITIVE — SIGNIFICANT CHANGE UP
SARS-COV-2 IGG+IGM SERPL QL IA: 8.32 INDEX — HIGH
SARS-COV-2 IGG+IGM SERPL QL IA: POSITIVE
SODIUM SERPL-SCNC: 138 MMOL/L — SIGNIFICANT CHANGE UP (ref 135–145)
SODIUM SERPL-SCNC: 138 MMOL/L — SIGNIFICANT CHANGE UP (ref 135–145)
WBC # BLD: 11.16 K/UL — HIGH (ref 3.8–10.5)
WBC # FLD AUTO: 11.16 K/UL — HIGH (ref 3.8–10.5)

## 2024-06-01 PROCEDURE — 99232 SBSQ HOSP IP/OBS MODERATE 35: CPT

## 2024-06-01 PROCEDURE — 99232 SBSQ HOSP IP/OBS MODERATE 35: CPT | Mod: GC

## 2024-06-01 PROCEDURE — 93010 ELECTROCARDIOGRAM REPORT: CPT

## 2024-06-01 RX ORDER — QUETIAPINE FUMARATE 200 MG/1
50 TABLET, FILM COATED ORAL EVERY 12 HOURS
Refills: 0 | Status: DISCONTINUED | OUTPATIENT
Start: 2024-06-01 | End: 2024-06-03

## 2024-06-01 RX ORDER — INSULIN LISPRO 100/ML
VIAL (ML) SUBCUTANEOUS
Refills: 0 | Status: DISCONTINUED | OUTPATIENT
Start: 2024-06-01 | End: 2024-06-06

## 2024-06-01 RX ORDER — HYDRALAZINE HCL 50 MG
10 TABLET ORAL ONCE
Refills: 0 | Status: COMPLETED | OUTPATIENT
Start: 2024-06-01 | End: 2024-06-01

## 2024-06-01 RX ORDER — INSULIN LISPRO 100/ML
VIAL (ML) SUBCUTANEOUS AT BEDTIME
Refills: 0 | Status: DISCONTINUED | OUTPATIENT
Start: 2024-06-01 | End: 2024-06-06

## 2024-06-01 RX ORDER — POTASSIUM PHOSPHATE, MONOBASIC POTASSIUM PHOSPHATE, DIBASIC 236; 224 MG/ML; MG/ML
30 INJECTION, SOLUTION INTRAVENOUS ONCE
Refills: 0 | Status: COMPLETED | OUTPATIENT
Start: 2024-06-01 | End: 2024-06-01

## 2024-06-01 RX ORDER — PANTOPRAZOLE SODIUM 20 MG/1
40 TABLET, DELAYED RELEASE ORAL
Refills: 0 | Status: DISCONTINUED | OUTPATIENT
Start: 2024-06-01 | End: 2024-06-11

## 2024-06-01 RX ORDER — AMLODIPINE BESYLATE 2.5 MG/1
5 TABLET ORAL DAILY
Refills: 0 | Status: DISCONTINUED | OUTPATIENT
Start: 2024-06-02 | End: 2024-06-11

## 2024-06-01 RX ADMIN — Medication 255 MILLIMOLE(S): at 10:00

## 2024-06-01 RX ADMIN — DEXMEDETOMIDINE HYDROCHLORIDE IN 0.9% SODIUM CHLORIDE 3.98 MICROGRAM(S)/KG/HR: 4 INJECTION INTRAVENOUS at 19:22

## 2024-06-01 RX ADMIN — LACTULOSE 20 GRAM(S): 10 SOLUTION ORAL at 06:00

## 2024-06-01 RX ADMIN — POTASSIUM PHOSPHATE, MONOBASIC POTASSIUM PHOSPHATE, DIBASIC 83.33 MILLIMOLE(S): 236; 224 INJECTION, SOLUTION INTRAVENOUS at 09:34

## 2024-06-01 RX ADMIN — Medication 2: at 16:54

## 2024-06-01 RX ADMIN — Medication 44.17 GRAM(S): at 09:00

## 2024-06-01 RX ADMIN — HEPARIN SODIUM 5000 UNIT(S): 5000 INJECTION INTRAVENOUS; SUBCUTANEOUS at 18:02

## 2024-06-01 RX ADMIN — Medication 50 MILLILITER(S): at 06:01

## 2024-06-01 RX ADMIN — LACTULOSE 20 GRAM(S): 10 SOLUTION ORAL at 22:00

## 2024-06-01 RX ADMIN — LACTULOSE 20 GRAM(S): 10 SOLUTION ORAL at 15:37

## 2024-06-01 RX ADMIN — LACTULOSE 20 GRAM(S): 10 SOLUTION ORAL at 18:02

## 2024-06-01 RX ADMIN — DEXMEDETOMIDINE HYDROCHLORIDE IN 0.9% SODIUM CHLORIDE 3.98 MICROGRAM(S)/KG/HR: 4 INJECTION INTRAVENOUS at 22:00

## 2024-06-01 RX ADMIN — CHLORHEXIDINE GLUCONATE 1 APPLICATION(S): 213 SOLUTION TOPICAL at 06:01

## 2024-06-01 RX ADMIN — Medication 2 MILLIGRAM(S): at 23:42

## 2024-06-01 RX ADMIN — HEPARIN SODIUM 5000 UNIT(S): 5000 INJECTION INTRAVENOUS; SUBCUTANEOUS at 06:01

## 2024-06-01 RX ADMIN — CEFTRIAXONE 100 MILLIGRAM(S): 500 INJECTION, POWDER, FOR SOLUTION INTRAMUSCULAR; INTRAVENOUS at 18:02

## 2024-06-01 RX ADMIN — Medication 10 MILLIGRAM(S): at 03:07

## 2024-06-01 RX ADMIN — AMLODIPINE BESYLATE 5 MILLIGRAM(S): 2.5 TABLET ORAL at 06:01

## 2024-06-01 RX ADMIN — Medication 255 MILLIMOLE(S): at 00:05

## 2024-06-01 NOTE — PROGRESS NOTE ADULT - ASSESSMENT
38M PMH HTN, ETOH use (daily drink) PSH gastric sleeve, initially admitted to [facility] for jaundice and confusion, found to have elevated transaminases to 4000s with high MELD and withdrawal symptoms, transferred to [facility] for liver transplant workup.     MRSA/MSSA nasal PCR (May 29) negative  Urine culture (May 29) no growth  Blood cultures (May 29) no growth to date    CT neck (May 29) Enlarged palatine tonsils without hyperemia or surrounding infiltrative changes.  CT chest (May 29) no focal consolidation  CT abdomen pelvis (May 29) Diffuse, marked hepatic steatosis, Question of mild gallbladder wall thickening    #Acute liver injury, transaminitis  Hepatitis A IgM (May 29) negative  Hepatitis B DNA by PCR (May 29) negative  Hepatitis C RNA (May 29) negative  CMV by PCR (May 29) negative  HSV 1/2 serum PCR Negative  -- Recommend adenovirus serum PCR  -- Follow up on varicella DNA PCR  -- Follow up on  HIV viral load  -- Stop Ceftriaxone given unrevealing ID workup    #Preliver transplant evaluation  COVID19 Ortiz Antibody positive  COVID19 Nucleocapsid Antibody positive  HAV IgG positive  HBVs Ab Negative, HBVs Ag Negative, HBVc Ab negative, HBV PCR Negative  HCV Ab negative, HCV PCR Negative  HSV 1 IgG positive  HSV 2 IgG Equivocal  EBV IgG positive  CMV IgG positive  VZV IgG positive  Measles IgG negative, Mumps IgG negative, Rubella Equivocal  HIV Ag/Ab by CMIA negative  Toxoplasma IgG positive  --No absolute ID contraindication for OLT  -- Follow-up on Coccidioides antibody  -- Follow-up on Trypanosoma cruzi Ab  -- Follow-up on Strongyloides Ab   -- Follow-up on Quantiferon Gold  -- Follow-up on Strongyloides Ab    #Encounter to Vaccinate Patient - Will defer vaccinations until further clinical stability given acuity of presentation  COVID19: Would benefit from COVID19 [id number] Vaccine Dose  Influenza: Will require with next season  Pneumococcal: Would benefit from PCV20  HAV: Immune, would not require further vaccination  HBV: Would benefit from Heplisav vaccination  MMR: Not mumps or rubeola immune, ideally would require dose of MMR but this would be a live vaccine  Varicella: Immune, would not require further vaccination  Shingles: Will require Shingrix  Tdap: Will require Tdap    I will continue to follow. Please feel free to contact me with any further questions.    Steve Trevizo M.D.  [facility] Division of Infectious Disease  8AM-5PM Monday - Friday: Available on [employer] Teams  After Hours and Holidays (or if no response on [employer] Teams): Please contact the Infectious Diseases Office at [phone]    The above assessment and plan were discussed with Liliana, transplant surgery PA

## 2024-06-01 NOTE — PROGRESS NOTE ADULT - ASSESSMENT
38M PMH HTN, ETOH use (daily drink) PSH gastric sleeve, admitted for acute liver failure and Liver transplant eval.     Interval events  - Improving  - precedex    PLAN  NEURO  - Pain: None  - Sedation: Precedex  - Lorazepam PRN for CIWA  - Trend ammonia  - Lactulose for hepatic encephalopathy     RESP  - Maintain O2 saturation >92%  - AM CXRs    CV  - MAP goal >65  - Pressors: None  - 5/29 TTE ordered    GI/NUTRITION  - Diet: Reg  - Protonix IV daily  - Lactulose  - Acetylcysteine gtt     RENAL/  - Strict I&Os  - albumin 50 q8    HEME  - DVT ppx: SQH    ID  - ABX: CTX (5/29-)  - 5/29: BCx - results pending  - 5/29: +UA, UCx pending  ENDO  - Monitor blood glucose     LINES  - PIVs    CODE: FULL  DISPO: SICU

## 2024-06-01 NOTE — PROGRESS NOTE ADULT - SUBJECTIVE AND OBJECTIVE BOX
Gastroenterology/Hepatology Progress Note      Interval Events:     Allergies:  No Known Allergies      Hospital Medications:  acetylcysteine IVPB 12 Gram(s) IV Intermittent every 24 hours  cefTRIAXone   IVPB 2000 milliGRAM(s) IV Intermittent every 24 hours  chlorhexidine 2% Cloths 1 Application(s) Topical <User Schedule>  dexMEDEtomidine Infusion 0.2 MICROgram(s)/kG/Hr IV Continuous <Continuous>  folic acid 1 milliGRAM(s) Oral daily  heparin   Injectable 5000 Unit(s) SubCutaneous every 12 hours  lactulose Syrup 20 Gram(s) Oral every 4 hours  LORazepam   Injectable 2 milliGRAM(s) IV Push every 1 hour PRN  multivitamin 1 Tablet(s) Oral daily  pantoprazole    Tablet 40 milliGRAM(s) Oral before breakfast  potassium phosphate IVPB 30 milliMole(s) IV Intermittent once  sodium phosphate 15 milliMole(s)/250 mL IVPB 15 milliMole(s) IV Intermittent once  thiamine 100 milliGRAM(s) Oral daily      ROS: 14 point ROS negative unless otherwise state in subjective    PHYSICAL EXAM:   Vital Signs:  Vital Signs Last 24 Hrs  T(C): 36.6 (2024 07:00), Max: 37.4 (31 May 2024 19:00)  T(F): 97.9 (2024 07:00), Max: 99.3 (31 May 2024 19:00)  HR: 70 (2024 08:00) (65 - 92)  BP: 122/84 (2024 08:00) (108/60 - 177/100)  BP(mean): 99 (2024 08:00) (78 - 135)  RR: 15 (2024 08:00) (15 - 28)  SpO2: 95% (2024 08:00) (92% - 100%)    Parameters below as of 2024 05:00  Patient On (Oxygen Delivery Method): room air      Daily     Daily Weight in k.4 (2024 01:00)    GENERAL:  No acute distress  HEENT:  NCAT, no scleral icterus  CHEST: no resp distress  HEART:  RRR  ABDOMEN:  Soft, non-tender, non-distended, normoactive bowel sounds, no masses  EXTREMITIES:  No cyanosis, clubbing, or edema  SKIN:  No rash/erythema/ecchymoses/petechiae/wounds/abscess/warm/dry  NEURO:  Alert and oriented x 3, no asterixis, no tremor    LABS:                        14.1   11.16 )-----------( 170      ( 2024 07:30 )             39.8     Mean Cell Volume: 90.7 fl (24 @ 07:30)        138  |  104  |  9   ----------------------------<  172<H>  3.6   |  20<L>  |  0.46<L>    Ca    10.0      2024 07:30  Phos  1.5       Mg     2.2         TPro  6.6  /  Alb  3.9  /  TBili  16.1<H>  /  DBili  x   /  AST  97<H>  /  ALT  1045<H>  /  AlkPhos  275<H>  06    LIVER FUNCTIONS - ( 2024 07:30 )  Alb: 3.9 g/dL / Pro: 6.6 g/dL / ALK PHOS: 275 U/L / ALT: 1045 U/L / AST: 97 U/L / GGT: x           PT/INR - ( 2024 07:30 )   PT: 21.5 sec;   INR: 2.09 ratio         PTT - ( 2024 07:30 )  PTT:28.0 sec  Urinalysis Basic - ( 2024 07:30 )    Color: x / Appearance: x / SG: x / pH: x  Gluc: 172 mg/dL / Ketone: x  / Bili: x / Urobili: x   Blood: x / Protein: x / Nitrite: x   Leuk Esterase: x / RBC: x / WBC x   Sq Epi: x / Non Sq Epi: x / Bacteria: x      Amylase Serum--      Lipase serum--       [id number]        Imaging:           Gastroenterology/Hepatology Progress Note      Interval Events:   - Yesterday morning, precedex was decreased from 0.5 to 0.3, but patient became agitated, so it was increased.   - Overnight, patient required Ativan 2 mg as he was agitated despite being on max dose of precedex.   - This morning, he was calm, and answered questions appropriately but intermittently followed commands.   - No fevers or chills overnight.     Allergies:  No Known Allergies      Hospital Medications:  acetylcysteine IVPB 12 Gram(s) IV Intermittent every 24 hours  cefTRIAXone   IVPB 2000 milliGRAM(s) IV Intermittent every 24 hours  chlorhexidine 2% Cloths 1 Application(s) Topical <User Schedule>  dexMEDEtomidine Infusion 0.2 MICROgram(s)/kG/Hr IV Continuous <Continuous>  folic acid 1 milliGRAM(s) Oral daily  heparin   Injectable 5000 Unit(s) SubCutaneous every 12 hours  lactulose Syrup 20 Gram(s) Oral every 4 hours  LORazepam   Injectable 2 milliGRAM(s) IV Push every 1 hour PRN  multivitamin 1 Tablet(s) Oral daily  pantoprazole    Tablet 40 milliGRAM(s) Oral before breakfast  potassium phosphate IVPB 30 milliMole(s) IV Intermittent once  sodium phosphate 15 milliMole(s)/250 mL IVPB 15 milliMole(s) IV Intermittent once  thiamine 100 milliGRAM(s) Oral daily      ROS: 14 point ROS negative unless otherwise state in subjective    PHYSICAL EXAM:   Vital Signs:  Vital Signs Last 24 Hrs  T(C): 36.6 (2024 07:00), Max: 37.4 (31 May 2024 19:00)  T(F): 97.9 (2024 07:00), Max: 99.3 (31 May 2024 19:00)  HR: 70 (2024 08:00) (65 - 92)  BP: 122/84 (2024 08:00) (108/60 - 177/100)  BP(mean): 99 (2024 08:00) (78 - 135)  RR: 15 (2024 08:00) (15 - 28)  SpO2: 95% (2024 08:00) (92% - 100%)    Parameters below as of 2024 05:00  Patient On (Oxygen Delivery Method): room air      Daily     Daily Weight in k.4 (2024 01:00)    GENERAL:  lethargic, sitting on the bed.   HEENT:  NCAT, no scleral icterus  CHEST: no resp distress  HEART:  RRR  ABDOMEN:  Soft, non-tender, non-distended,   EXTREMITIES:  No LE edema b/l  SKIN:  No rash/erythema/ecchymoses/petechiae/wounds/abscess/warm/dry  NEURO:  Alert and oriented x 3, unable to assess asterixis.     LABS:                        14.1   11.16 )-----------( 170      ( 2024 07:30 )             39.8     Mean Cell Volume: 90.7 fl (- @ 07:30)    06-    138  |  104  |  9   ----------------------------<  172<H>  3.6   |  20<L>  |  0.46<L>    Ca    10.0      2024 07:30  Phos  1.5     06  Mg     2.2     06    TPro  6.6  /  Alb  3.9  /  TBili  16.1<H>  /  DBili  x   /  AST  97<H>  /  ALT  1045<H>  /  AlkPhos  275<H>  06-    LIVER FUNCTIONS - ( 2024 07:30 )  Alb: 3.9 g/dL / Pro: 6.6 g/dL / ALK PHOS: 275 U/L / ALT: 1045 U/L / AST: 97 U/L / GGT: x           PT/INR - ( 2024 07:30 )   PT: 21.5 sec;   INR: 2.09 ratio         PTT - ( 2024 07:30 )  PTT:28.0 sec  Urinalysis Basic - ( 2024 07:30 )    Color: x / Appearance: x / SG: x / pH: x  Gluc: 172 mg/dL / Ketone: x  / Bili: x / Urobili: x   Blood: x / Protein: x / Nitrite: x   Leuk Esterase: x / RBC: x / WBC x   Sq Epi: x / Non Sq Epi: x / Bacteria: x      Amylase Serum--      Lipase serum--       [id number]        Imaging:           Gastroenterology/Hepatology Progress Note      Interval Events:   - Yesterday morning, precedex was decreased from 0.5 to 0.3, but patient became agitated, so it was increased.   - Overnight, patient required Ativan 2 mg as he was agitated despite being on max dose of precedex.   - This morning, he was calm, and answered questions appropriately but intermittently followed commands.   - No fevers or chills overnight.     Allergies:  No Known Allergies      Hospital Medications:  acetylcysteine IVPB 12 Gram(s) IV Intermittent every 24 hours  cefTRIAXone   IVPB 2000 milliGRAM(s) IV Intermittent every 24 hours  chlorhexidine 2% Cloths 1 Application(s) Topical <User Schedule>  dexMEDEtomidine Infusion 0.2 MICROgram(s)/kG/Hr IV Continuous <Continuous>  folic acid 1 milliGRAM(s) Oral daily  heparin   Injectable 5000 Unit(s) SubCutaneous every 12 hours  lactulose Syrup 20 Gram(s) Oral every 4 hours  LORazepam   Injectable 2 milliGRAM(s) IV Push every 1 hour PRN  multivitamin 1 Tablet(s) Oral daily  pantoprazole    Tablet 40 milliGRAM(s) Oral before breakfast  potassium phosphate IVPB 30 milliMole(s) IV Intermittent once  sodium phosphate 15 milliMole(s)/250 mL IVPB 15 milliMole(s) IV Intermittent once  thiamine 100 milliGRAM(s) Oral daily      ROS: 14 point ROS negative unless otherwise state in subjective    PHYSICAL EXAM:   Vital Signs:  Vital Signs Last 24 Hrs  T(C): 36.6 (2024 07:00), Max: 37.4 (31 May 2024 19:00)  T(F): 97.9 (2024 07:00), Max: 99.3 (31 May 2024 19:00)  HR: 70 (2024 08:00) (65 - 92)  BP: 122/84 (2024 08:00) (108/60 - 177/100)  BP(mean): 99 (2024 08:00) (78 - 135)  RR: 15 (2024 08:00) (15 - 28)  SpO2: 95% (2024 08:00) (92% - 100%)    Parameters below as of 2024 05:00  Patient On (Oxygen Delivery Method): room air      Daily     Daily Weight in k.4 (2024 01:00)    GENERAL:  lethargic, sitting on the bed.   HEENT:  NCAT, no scleral icterus  CHEST: no resp distress  HEART:  RRR  ABDOMEN:  Soft, non-tender, non-distended,   EXTREMITIES:  No LE edema b/l  SKIN:  No rash/erythema/ecchymoses/petechiae/wounds/abscess/warm/dry  NEURO:  Alert and oriented x 3, unable to assess asterixis.     LABS:                        14.1   11.16 )-----------( 170      ( 2024 07:30 )             39.8     Mean Cell Volume: 90.7 fl (- @ 07:30)    06-    138  |  104  |  9   ----------------------------<  172<H>  3.6   |  20<L>  |  0.46<L>    Ca    10.0      2024 07:30  Phos  1.5     06  Mg     2.2     06    TPro  6.6  /  Alb  3.9  /  TBili  16.1<H>  /  DBili  x   /  AST  97<H>  /  ALT  1045<H>  /  AlkPhos  275<H>  06-    LIVER FUNCTIONS - ( 2024 07:30 )  Alb: 3.9 g/dL / Pro: 6.6 g/dL / ALK PHOS: 275 U/L / ALT: 1045 U/L / AST: 97 U/L / GGT: x           PT/INR - ( 2024 07:30 )   PT: 21.5 sec;   INR: 2.09 ratio         PTT - ( 2024 07:30 )  PTT:28.0 sec  Urinalysis Basic - ( 2024 07:30 )    Color: x / Appearance: x / SG: x / pH: x  Gluc: 172 mg/dL / Ketone: x  / Bili: x / Urobili: x   Blood: x / Protein: x / Nitrite: x   Leuk Esterase: x / RBC: x / WBC x   Sq Epi: x / Non Sq Epi: x / Bacteria: x      Amylase Serum--      Lipase serum--       [id number]        Imaging: See diagnostic report       Gastroenterology/Hepatology Progress Note      Interval Events:   - Yesterday morning, precedex was decreased from 0.5 to 0.3, but patient became agitated, so it was increased.   - Overnight, patient required Ativan 2 mg as he was agitated despite being on max dose of precedex.   - This morning, he was calm, and answered questions appropriately but intermittently followed commands.   - No fevers or chills overnight.     Allergies:  No Known Allergies      Hospital Medications:  acetylcysteine IVPB 12 Gram(s) IV Intermittent every 24 hours  cefTRIAXone   IVPB 2000 milliGRAM(s) IV Intermittent every 24 hours  chlorhexidine 2% Cloths 1 Application(s) Topical <User Schedule>  dexMEDEtomidine Infusion 0.2 MICROgram(s)/kG/Hr IV Continuous <Continuous>  folic acid 1 milliGRAM(s) Oral daily  heparin   Injectable 5000 Unit(s) SubCutaneous every 12 hours  lactulose Syrup 20 Gram(s) Oral every 4 hours  LORazepam   Injectable 2 milliGRAM(s) IV Push every 1 hour PRN  multivitamin 1 Tablet(s) Oral daily  pantoprazole    Tablet 40 milliGRAM(s) Oral before breakfast  potassium phosphate IVPB 30 milliMole(s) IV Intermittent once  sodium phosphate 15 milliMole(s)/250 mL IVPB 15 milliMole(s) IV Intermittent once  thiamine 100 milliGRAM(s) Oral daily      ROS:  Unable to obtain due to AMS    PHYSICAL EXAM:   Vital Signs:  Vital Signs Last 24 Hrs  T(C): 36.6 (2024 07:00), Max: 37.4 (31 May 2024 19:00)  T(F): 97.9 (2024 07:00), Max: 99.3 (31 May 2024 19:00)  HR: 70 (2024 08:00) (65 - 92)  BP: 122/84 (2024 08:00) (108/60 - 177/100)  BP(mean): 99 (2024 08:00) (78 - 135)  RR: 15 (2024 08:00) (15 - 28)  SpO2: 95% (2024 08:00) (92% - 100%)    Parameters below as of 2024 05:00  Patient On (Oxygen Delivery Method): room air      Daily     Daily Weight in k.4 (2024 01:00)    GENERAL:  lethargic, sitting on the bed.   HEENT:  NCAT, +scleral icterus  CHEST: no resp distress  HEART:  RRR  ABDOMEN:  Soft, non-tender, non-distended,   EXTREMITIES:  No LE edema b/l  SKIN:  Jaundiced, no rash  NEURO: Lethargic but arousable, oriented x 3, no definite asterixis by milk-maid sign but difficult to assess due to poor cooperation    LABS:                        14.1   11.16 )-----------( 170      ( 2024 07:30 )             39.8     Mean Cell Volume: 90.7 fl (- @ 07:30)        138  |  104  |  9   ----------------------------<  172<H>  3.6   |  20<L>  |  0.46<L>    Ca    10.0      2024 07:30  Phos  1.5       Mg     2.2         TPro  6.6  /  Alb  3.9  /  TBili  16.1<H>  /  DBili  x   /  AST  97<H>  /  ALT  1045<H>  /  AlkPhos  275<H>      LIVER FUNCTIONS - ( 2024 07:30 )  Alb: 3.9 g/dL / Pro: 6.6 g/dL / ALK PHOS: 275 U/L / ALT: 1045 U/L / AST: 97 U/L / GGT: x           PT/INR - ( 2024 07:30 )   PT: 21.5 sec;   INR: 2.09 ratio         PTT - ( 2024 07:30 )  PTT:28.0 sec  Urinalysis Basic - ( 2024 07:30 )    Color: x / Appearance: x / SG: x / pH: x  Gluc: 172 mg/dL / Ketone: x  / Bili: x / Urobili: x   Blood: x / Protein: x / Nitrite: x   Leuk Esterase: x / RBC: x / WBC x   Sq Epi: x / Non Sq Epi: x / Bacteria: x      Amylase Serum--      Lipase serum--       [id number]        Imaging: See diagnostic report

## 2024-06-01 NOTE — PROGRESS NOTE ADULT - SUBJECTIVE AND OBJECTIVE BOX
Interval events  -naeon      NEURO  Exam: awake, alert  Meds: dexMEDEtomidine Infusion 0.2 MICROgram(s)/kG/Hr IV Continuous <Continuous>  LORazepam   Injectable 2 milliGRAM(s) IV Push every 1 hour PRN CIWA-Ar score 8 or greater    [x] Adequacy of sedation and pain control has been assessed and adjusted      RESPIRATORY  RR: 24 (06-01-24 @ 02:00) (16 - 28)  SpO2: 93% (06-01-24 @ 02:00) (93% - 97%)  Wt(kg): --  Exam: unlabored, clear to auscultation bilaterally  Mechanical Ventilation:     [N/A] Extubation Readiness Assessed  Meds:       CARDIOVASCULAR  HR: 87 (06-01-24 @ 02:00) (65 - 92)  BP: 159/81 (06-01-24 @ 02:00) (108/60 - 179/97)  BP(mean): 112 (06-01-24 @ 02:00) (78 - 134)  ABP: --  ABP(mean): --  Wt(kg): --  CVP(cm H2O): --  VBG - ( 31 May 2024 03:12 )  pH: 7.47  /  pCO2: 40    /  pO2: 73    / HCO3: 29    / Base Excess: 5.0   /  SaO2: 96.9   Lactate: 1.1                Exam: regular rate and rhythm  Cardiac Rhythm: sinus  Perfusion     [x]Adequate   [ ]Inadequate  Mentation   [x]Normal       [ ]Reduced  Extremities  [x]Warm         [ ]Cool  Volume Status [ ]Hypervolemic [x]Euvolemic [ ]Hypovolemic  Meds: amLODIPine   Tablet 5 milliGRAM(s) Oral daily  hydrALAZINE Injectable 10 milliGRAM(s) IV Push once        GI/NUTRITION  Exam: soft, nontender, nondistended, incision C/D/I  Diet:  Meds: lactulose Syrup 20 Gram(s) Oral every 4 hours  pantoprazole  Injectable 40 milliGRAM(s) IV Push every 24 hours      GENITOURINARY  I&O's Detail    05-30 @ 07:01  -  05-31 @ 07:00  --------------------------------------------------------  IN:    Albumin 25%  -  50 mL: 100 mL    Dexmedetomidine: 118.4 mL    IV PiggyBack: 1412.3 mL    IV PiggyBack: 1365 mL  Total IN: 2995.7 mL    OUT:    Voided (mL): 800 mL  Total OUT: 800 mL    Total NET: 2195.7 mL      05-31 @ 07:01  -  06-01 @ 03:04  --------------------------------------------------------  IN:    Albumin 25%  -  50 mL: 100 mL    Dexmedetomidine: 432.4 mL    IV PiggyBack: 750 mL    IV PiggyBack: 837.9 mL    Oral Fluid: 530 mL  Total IN: 2650.3 mL    OUT:    Voided (mL): 2650 mL  Total OUT: 2650 mL    Total NET: 0.3 mL          05-31    137  |  101  |  11  ----------------------------<  246<H>  4.1   |  22  |  0.50    Ca    9.0      31 May 2024 19:07  Phos  1.4     05-31  Mg     2.2     05-31    TPro  6.0  /  Alb  3.5  /  TBili  12.6<H>  /  DBili  x   /  AST  166<H>  /  ALT  1313<H>  /  AlkPhos  275<H>  05-31    [ ] Corbett catheter, indication: N/A  Meds: albumin human 25% IVPB 50 milliLiter(s) IV Intermittent every 8 hours  folic acid 1 milliGRAM(s) Oral daily  multivitamin 1 Tablet(s) Oral daily  thiamine 100 milliGRAM(s) Oral daily        HEMATOLOGIC  Meds: heparin   Injectable 5000 Unit(s) SubCutaneous every 12 hours    [x] VTE Prophylaxis                        12.7   7.95  )-----------( 127      ( 31 May 2024 03:30 )             35.7     PT/INR - ( 31 May 2024 03:30 )   PT: 27.5 sec;   INR: 2.70 ratio         PTT - ( 31 May 2024 03:30 )  PTT:30.9 sec  Transfusion     [ ] PRBC   [ ] Platelets   [ ] FFP   [ ] Cryoprecipitate      INFECTIOUS DISEASES  WBC Count: 7.95 K/uL (05-31 @ 03:30)    RECENT CULTURES:  Specimen Source: Clean Catch Clean Catch (Midstream)  Date/Time: 05-29 @ 17:16  Culture Results:   <10,000 CFU/mL Normal Urogenital Aviva  Gram Stain: --  Organism: --  Specimen Source: .Blood Blood-Peripheral  Date/Time: 05-29 @ 13:40  Culture Results:   No growth at 48 Hours  Gram Stain: --  Organism: --  Specimen Source: .Blood Blood-Peripheral  Date/Time: 05-29 @ 13:30  Culture Results:   No growth at 48 Hours  Gram Stain: --  Organism: --  Specimen Source: .Blood Blood  Date/Time: 05-29 @ 12:00  Culture Results:   No growth at 48 Hours  Gram Stain: --  Organism: --  Specimen Source: .Blood Blood  Date/Time: 05-29 @ 11:50  Culture Results:   No growth at 48 Hours  Gram Stain: --  Organism: --    Meds: cefTRIAXone   IVPB 2000 milliGRAM(s) IV Intermittent every 24 hours        ENDOCRINE  CAPILLARY BLOOD GLUCOSE      POCT Blood Glucose.: 201 mg/dL (31 May 2024 23:58)    Meds:       ACCESS DEVICES:  [ ] Peripheral IV  [ ] Central Venous Line	[ ] R	[ ] L	[ ] IJ	[ ] Fem	[ ] SC	Placed:   [ ] Arterial Line		[ ] R	[ ] L	[ ] Fem	[ ] Rad	[ ] Ax	Placed:   [ ] PICC:					[ ] Mediport  [ ] Urinary Catheter, Date Placed:   [x] Necessity of urinary, arterial, and venous catheters discussed    OTHER MEDICATIONS:  acetylcysteine IVPB 12 Gram(s) IV Intermittent every 24 hours  chlorhexidine 2% Cloths 1 Application(s) Topical <User Schedule>      CODE STATUS:      IMAGING:

## 2024-06-01 NOTE — PROGRESS NOTE ADULT - NS ATTEND AMEND GEN_ALL_CORE FT
38M with AUD admitted with acute liver failure, MELD 34  +recent etoh use and tetracycline  +AMS  liver function continues to improve. No need for biopsy at this time  Cont abx, NAC until INR <2  mgmt as per SICU and hepatology

## 2024-06-01 NOTE — PROGRESS NOTE ADULT - SUBJECTIVE AND OBJECTIVE BOX
Transplant Surgery - Multidisciplinary Rounds  --------------------------------------------------------------  Present:   Patient seen and examined with multidisciplinary Transplant team including Surgeon: Dr Amaya, Hepatologist: Dr Beth,  CROW Albarran, and beside RN during AM rounds. Disciplines not in attendance will be notified of the plan.     Interval Events:   - agitated overnight, ativan x 1, on precedex  - remains on NAC gtt; LFTs trending down     Potential Discharge date: TBD  Education:  Medications  Plan of care:  See Below      MEDICATIONS  (STANDING):  acetylcysteine IVPB 12 Gram(s) IV Intermittent every 24 hours  cefTRIAXone   IVPB 2000 milliGRAM(s) IV Intermittent every 24 hours  chlorhexidine 2% Cloths 1 Application(s) Topical <User Schedule>  dexMEDEtomidine Infusion 0.2 MICROgram(s)/kG/Hr (3.98 mL/Hr) IV Continuous <Continuous>  folic acid 1 milliGRAM(s) Oral daily  heparin   Injectable 5000 Unit(s) SubCutaneous every 12 hours  lactulose Syrup 20 Gram(s) Oral every 4 hours  multivitamin 1 Tablet(s) Oral daily  pantoprazole    Tablet 40 milliGRAM(s) Oral before breakfast  potassium phosphate IVPB 30 milliMole(s) IV Intermittent once  sodium phosphate 15 milliMole(s)/250 mL IVPB 15 milliMole(s) IV Intermittent once  thiamine 100 milliGRAM(s) Oral daily    MEDICATIONS  (PRN):  LORazepam   Injectable 2 milliGRAM(s) IV Push every 1 hour PRN CIWA-Ar score 8 or greater      PAST MEDICAL & SURGICAL HISTORY:  Hypertension, unspecified type  H/O gastric sleeve    Vital Signs Last 24 Hrs  T(C): 36.6 (01 Jun 2024 07:00), Max: 37.4 (31 May 2024 19:00)  T(F): 97.9 (01 Jun 2024 07:00), Max: 99.3 (31 May 2024 19:00)  HR: 70 (01 Jun 2024 08:00) (65 - 92)  BP: 122/84 (01 Jun 2024 08:00) (108/60 - 177/100)  BP(mean): 99 (01 Jun 2024 08:00) (78 - 135)  RR: 15 (01 Jun 2024 08:00) (15 - 28)  SpO2: 95% (01 Jun 2024 08:00) (92% - 100%)    Parameters below as of 01 Jun 2024 05:00  Patient On (Oxygen Delivery Method): room air    I&O's Summary    31 May 2024 07:01  -  01 Jun 2024 07:00  --------------------------------------------------------  IN: 3018.8 mL / OUT: 5850 mL / NET: -2831.2 mL                              14.1   11.16 )-----------( 170      ( 01 Jun 2024 07:30 )             39.8     06-01    138  |  104  |  9   ----------------------------<  172<H>  3.6   |  20<L>  |  0.46<L>    Ca    10.0      01 Jun 2024 07:30  Phos  1.5     06-01  Mg     2.2     06-01    TPro  6.6  /  Alb  3.9  /  TBili  16.1<H>  /  DBili  x   /  AST  97<H>  /  ALT  1045<H>  /  AlkPhos  275<H>  06-01          Culture - Urine (collected 05-29-24 @ 17:16)  Source: Clean Catch Clean Catch (Midstream)  Final Report (05-30-24 @ 14:02):    <10,000 CFU/mL Normal Urogenital Aviva    Culture - Blood (collected 05-29-24 @ 13:40)  Source: .Blood Blood-Peripheral  Preliminary Report (05-31-24 @ 18:01):    No growth at 48 Hours    Culture - Blood (collected 05-29-24 @ 13:30)  Source: .Blood Blood-Peripheral  Preliminary Report (05-31-24 @ 18:01):    No growth at 48 Hours    Culture - Blood (collected 05-29-24 @ 12:00)  Source: .Blood Blood  Preliminary Report (05-31-24 @ 17:01):    No growth at 48 Hours    Culture - Blood (collected 05-29-24 @ 11:50)  Source: .Blood Blood  Preliminary Report (05-31-24 @ 17:01):    No growth at 48 Hours                    Review of systems  unable to obtain, AMS      PHYSICAL EXAM:  GENERAL: Patient appears confused  HEENT:  NCAT, + scleral icterus  CHEST: no resp distress  HEART:  RRR  ABDOMEN:  Soft, non-tender, non-distended, no masses  EXTREMITIES:  No cyanosis, clubbing, or edema  SKIN:  No rash/erythema/ecchymoses/petechiae/wounds/abscess/warm/dry  NEURO:  Alert and oriented x 1

## 2024-06-01 NOTE — PROGRESS NOTE ADULT - ASSESSMENT
38M PMH HTN, ETOH use (daily drink) PSH gastric sleeve, initially admitted to [facility] for jaundice and confusion, found to have elevated transaminases to 4000s with high MELD and withdrawal symptoms, transferred to [facility] for liver transplant workup.     [] Acute alc hep  [] ETOH Cirrhosis   - LFTs trending down  - add daily lactate and ammonia to labs  - cont NAC gtt (keep until INR < 2.)  - CT chest/ A/P with diffuse, marked hepatic steatosis   - CT head with no acute intracranial hemorrhage, mass effect or midline shift.  - albumin tid   - labs bid

## 2024-06-01 NOTE — PROGRESS NOTE ADULT - SUBJECTIVE AND OBJECTIVE BOX
Follow Up: Liver injury, acute     Interval History: remains agitated, lethargic. afebrile.     REVIEW OF SYSTEMS  [  ] ROS unobtainable because:    [x  ] All other systems negative except as noted below    Constitutional:  [ ] fever [ ] chills  [ ] weight loss  [ ] weakness  Skin:  [ ] rash [ ] phlebitis	  Eyes: [ ] icterus [ ] pain  [ ] discharge	  ENMT: [ ] sore throat  [ ] thrush [ ] ulcers [ ] exudates  Respiratory: [ ] dyspnea [ ] hemoptysis [ ] cough [ ] sputum	  Cardiovascular:  [ ] chest pain [ ] palpitations [ ] edema	  Gastrointestinal:  [ ] nausea [ ] vomiting [ ] diarrhea [ ] constipation [ ] pain	  Genitourinary:  [ ] dysuria [ ] frequency [ ] hematuria [ ] discharge [ ] flank pain  [ ] incontinence  Musculoskeletal:  [ ] myalgias [ ] arthralgias [ ] arthritis  [ ] back pain  Neurological:  [ ] headache [ ] seizures  [ ] confusion/altered mental status    Allergies  No Known Allergies        ANTIMICROBIALS:  cefTRIAXone   IVPB 2000 every 24 hours      OTHER MEDS:  MEDICATIONS  (STANDING):  dexMEDEtomidine Infusion 0.2 <Continuous>  heparin   Injectable 5000 every 12 hours  insulin lispro (ADMELOG) corrective regimen sliding scale  three times a day before meals  insulin lispro (ADMELOG) corrective regimen sliding scale  at bedtime  lactulose Syrup 20 every 4 hours  LORazepam   Injectable 2 every 1 hour PRN  pantoprazole    Tablet 40 before breakfast      Vital Signs Last 24 Hrs  T(C): 36.5 (01 Jun 2024 11:00), Max: 37.4 (31 May 2024 19:00)  T(F): 97.7 (01 Jun 2024 11:00), Max: 99.3 (31 May 2024 19:00)  HR: 83 (01 Jun 2024 14:00) (65 - 87)  BP: 129/84 (01 Jun 2024 14:00) (111/72 - 177/100)  BP(mean): 101 (01 Jun 2024 14:00) (84 - 135)  RR: 17 (01 Jun 2024 14:00) (15 - 28)  SpO2: 96% (01 Jun 2024 14:00) (92% - 100%)    Parameters below as of 01 Jun 2024 05:00  Patient On (Oxygen Delivery Method): room air        PHYSICAL EXAMINATION:  General: Alert and Awake, NAD, lethargic  Cardiac: RRR, No M/R/G  Resp: CTAB, No Wh/Rh/Ra  Abdomen: NBS, NT/ND, No HSM, No rigidity or guarding  MSK: No LE edema. No Calf tenderness  : No levy  Skin: No rashes or lesions. Skin is warm and dry to the touch.   Neuro: Alert and Awake. CN 2-12 Grossly intact. Moves all four extremities spontaneously.  Psych: Encephalopathic - unable to assess                        14.1   11.16 )-----------( 170      ( 01 Jun 2024 07:30 )             39.8       06-01    138  |  104  |  9   ----------------------------<  172<H>  3.6   |  20<L>  |  0.46<L>    Ca    10.0      01 Jun 2024 07:30  Phos  1.5     06-01  Mg     2.2     06-01    TPro  6.6  /  Alb  3.9  /  TBili  16.1<H>  /  DBili  x   /  AST  97<H>  /  ALT  1045<H>  /  AlkPhos  275<H>  06-01      Urinalysis Basic - ( 01 Jun 2024 07:30 )    Color: x / Appearance: x / SG: x / pH: x  Gluc: 172 mg/dL / Ketone: x  / Bili: x / Urobili: x   Blood: x / Protein: x / Nitrite: x   Leuk Esterase: x / RBC: x / WBC x   Sq Epi: x / Non Sq Epi: x / Bacteria: x        MICROBIOLOGY:  v  Clean Catch Clean Catch (Midstream)  05-29-24   <10,000 CFU/mL Normal Urogenital Aviva  --  --      .Blood Blood-Peripheral  05-29-24   No growth at 48 Hours  --  --      .Blood Blood-Peripheral  05-29-24   No growth at 48 Hours  --  --      .Blood Blood  05-29-24   No growth at 48 Hours  --  --      .Blood Blood  05-29-24   No growth at 48 Hours  --  --        CMV IgG Antibody: 4.50 U/mL (05-29-24 @ 17:13)  Toxoplasma IgG Screen: 48.5 IU/mL (05-29-24 @ 17:13)    CMVPCR Log: NotDetec Mcq30HK/mL (05-29 @ 17:13)        RADIOLOGY:    <The imaging below has been reviewed and visualized by me independently. Findings as detailed in report below>    < from: Xray Chest 1 View- PORTABLE-Urgent (05.29.24 @ 14:40) >  IMPRESSION:  Clear lungs.    < end of copied text >

## 2024-06-01 NOTE — PROGRESS NOTE ADULT - ASSESSMENT
Impression:   38M PMH HTN, ETOH use (daily drink) PSH gastric sleeve, initially admitted to [facility] for jaundice and confusion, found to have elevated transaminases to 4000s with high MELD and withdrawal symptoms, transferred to [facility] for liver transplant workup.     #Acute liver failure in the setting of decompensated alcoholic cirrhosis  - Calculated MELD 3 score on 6/1 - 27. Presented w/ elevated liver enzymes, predominantly hepatocellular pattern, now AST/ALT trending down, w/ bili rising. INR has been slowly trending down.   - hepatitis B surface antibody, hepatitis B surface antigen, hepatitis core antibody, hepatitis C NR   - EV: No prior EGD.   - CT chest/ A/P with diffuse, marked hepatic steatosis   - CT head with no acute intracranial hemorrhage, mass effect or midline shift.  - MERLY negative, anti-mitochondrial antibody <1:20, anti-smooth muscle antibody <1:20, immunoglobulins (IgG 1022, IgM 122, IgA quantitative 502) for autoimmune etiologies. Utox neg.   - infectious work up neg - CTX d/c on 6/1.     #Acute encephalopathy in the setting of [facility]   - Currently he is agitated, has  been on precedex, and getting intermittent doses of Ativan which keeps him calm.   - AAOx3, but intermittent lethargic    Recommendations:   - continue with NAC drip for now.    - Please d/c CTX.   - Please obtain daily LA and NH3 levels.   - Sedation per SICU team, appreciate psych recs.   - Daily MELD labs.     Discussed the case with Dr. Beth.     All recommendations are tentative until note is attested by an attending.     Sandra Villanueva, PGY-5  Gastroenterology/Hepatology Fellow  Available on [employer] Teams  [phone] (Short Range Pager)  [phone] (Long Range Pager)    After 5pm, please contact the on-call GI fellow.       Impression:   38M PMH HTN, ETOH use (daily drink) PSH gastric sleeve, initially admitted to [facility] for jaundice and confusion, found to have elevated transaminases to 4000s with high MELD and withdrawal symptoms, transferred to [facility] for liver transplant workup.     #Acute liver failure in the setting of decompensated alcoholic cirrhosis  - Calculated MELD 3 score on 6/1 - 27. Presented w/ elevated liver enzymes, predominantly hepatocellular pattern, now AST/ALT trending down, w/ bili rising. INR has been slowly trending down.   - hepatitis B surface antibody, hepatitis B surface antigen, hepatitis core antibody, hepatitis C NR   - EV: No prior EGD.   - CT chest/ A/P with diffuse, marked hepatic steatosis   - CT head with no acute intracranial hemorrhage, mass effect or midline shift.  - MERLY negative, anti-mitochondrial antibody <1:20, anti-smooth muscle antibody <1:20, immunoglobulins (IgG 1022, IgM 122, IgA quantitative 502) for autoimmune etiologies. Utox neg.   - infectious work up neg - CTX d/c on 6/1.     #Acute encephalopathy in the setting of [facility]   - Currently he is agitated, has  been on precedex, and getting intermittent doses of Ativan which keeps him calm.   - AAOx3, but intermittent lethargic    Recommendations:   - continue with NAC drip for now.    - Please d/c CTX.   - Please obtain daily lactate and ammonia levels.   - Sedation per SICU team, appreciate psych recs.   - Daily MELD labs.     Discussed the case with Dr. Beth.     All recommendations are tentative until note is attested by an attending.     Sandra Villanueva, PGY-5  Gastroenterology/Hepatology Fellow  Available on [employer] Teams  [phone] (Short Range Pager)  [phone] (Long Range Pager)    After 5pm, please contact the on-call GI fellow.

## 2024-06-02 LAB
ALBUMIN SERPL ELPH-MCNC: 3.4 G/DL — SIGNIFICANT CHANGE UP (ref 3.3–5)
ALBUMIN SERPL ELPH-MCNC: 3.6 G/DL — SIGNIFICANT CHANGE UP (ref 3.3–5)
ALP SERPL-CCNC: 266 U/L — HIGH (ref 40–120)
ALP SERPL-CCNC: 276 U/L — HIGH (ref 40–120)
ALT FLD-CCNC: 587 U/L — HIGH (ref 10–45)
ALT FLD-CCNC: 764 U/L — HIGH (ref 10–45)
AMMONIA BLD-MCNC: 58 UMOL/L — HIGH (ref 11–55)
AMMONIA BLD-MCNC: 69 UMOL/L — HIGH (ref 11–55)
ANION GAP SERPL CALC-SCNC: 13 MMOL/L — SIGNIFICANT CHANGE UP (ref 5–17)
ANION GAP SERPL CALC-SCNC: 14 MMOL/L — SIGNIFICANT CHANGE UP (ref 5–17)
APPEARANCE UR: CLEAR — SIGNIFICANT CHANGE UP
APTT BLD: 27.6 SEC — SIGNIFICANT CHANGE UP (ref 24.5–35.6)
APTT BLD: 30.9 SEC — SIGNIFICANT CHANGE UP (ref 24.5–35.6)
AST SERPL-CCNC: 84 U/L — HIGH (ref 10–40)
AST SERPL-CCNC: 87 U/L — HIGH (ref 10–40)
BILIRUB SERPL-MCNC: 16.6 MG/DL — HIGH (ref 0.2–1.2)
BILIRUB SERPL-MCNC: 17.3 MG/DL — HIGH (ref 0.2–1.2)
BILIRUB UR-MCNC: ABNORMAL
BUN SERPL-MCNC: 10 MG/DL — SIGNIFICANT CHANGE UP (ref 7–23)
BUN SERPL-MCNC: 9 MG/DL — SIGNIFICANT CHANGE UP (ref 7–23)
CALCIUM SERPL-MCNC: 8.5 MG/DL — SIGNIFICANT CHANGE UP (ref 8.4–10.5)
CALCIUM SERPL-MCNC: 9.4 MG/DL — SIGNIFICANT CHANGE UP (ref 8.4–10.5)
CHLORIDE SERPL-SCNC: 105 MMOL/L — SIGNIFICANT CHANGE UP (ref 96–108)
CHLORIDE SERPL-SCNC: 105 MMOL/L — SIGNIFICANT CHANGE UP (ref 96–108)
CO2 SERPL-SCNC: 18 MMOL/L — LOW (ref 22–31)
CO2 SERPL-SCNC: 19 MMOL/L — LOW (ref 22–31)
COLOR SPEC: SIGNIFICANT CHANGE UP
CREAT SERPL-MCNC: 0.52 MG/DL — SIGNIFICANT CHANGE UP (ref 0.5–1.3)
CREAT SERPL-MCNC: 0.53 MG/DL — SIGNIFICANT CHANGE UP (ref 0.5–1.3)
DIFF PNL FLD: NEGATIVE — SIGNIFICANT CHANGE UP
EGFR: 132 ML/MIN/1.73M2 — SIGNIFICANT CHANGE UP
EGFR: 133 ML/MIN/1.73M2 — SIGNIFICANT CHANGE UP
GAS PNL BLDV: SIGNIFICANT CHANGE UP
GAS PNL BLDV: SIGNIFICANT CHANGE UP
GLUCOSE BLDC GLUCOMTR-MCNC: 117 MG/DL — HIGH (ref 70–99)
GLUCOSE BLDC GLUCOMTR-MCNC: 120 MG/DL — HIGH (ref 70–99)
GLUCOSE BLDC GLUCOMTR-MCNC: 122 MG/DL — HIGH (ref 70–99)
GLUCOSE BLDC GLUCOMTR-MCNC: 170 MG/DL — HIGH (ref 70–99)
GLUCOSE SERPL-MCNC: 129 MG/DL — HIGH (ref 70–99)
GLUCOSE SERPL-MCNC: 175 MG/DL — HIGH (ref 70–99)
GLUCOSE UR QL: NEGATIVE MG/DL — SIGNIFICANT CHANGE UP
HCT VFR BLD CALC: 38.1 % — LOW (ref 39–50)
HCT VFR BLD CALC: 39.4 % — SIGNIFICANT CHANGE UP (ref 39–50)
HGB BLD-MCNC: 13.6 G/DL — SIGNIFICANT CHANGE UP (ref 13–17)
HGB BLD-MCNC: 14 G/DL — SIGNIFICANT CHANGE UP (ref 13–17)
HIV-1 VIRAL LOAD RESULT: SIGNIFICANT CHANGE UP
HIV1 RNA # SERPL NAA+PROBE: SIGNIFICANT CHANGE UP COPIES/ML
HIV1 RNA SER-IMP: SIGNIFICANT CHANGE UP
HIV1 RNA SERPL NAA+PROBE-ACNC: SIGNIFICANT CHANGE UP
HIV1 RNA SERPL NAA+PROBE-LOG#: SIGNIFICANT CHANGE UP LG COP/ML
INR BLD: 1.87 RATIO — HIGH (ref 0.85–1.18)
INR BLD: 2 RATIO — HIGH (ref 0.85–1.18)
KETONES UR-MCNC: NEGATIVE MG/DL — SIGNIFICANT CHANGE UP
LEUKOCYTE ESTERASE UR-ACNC: NEGATIVE — SIGNIFICANT CHANGE UP
MAGNESIUM SERPL-MCNC: 1.9 MG/DL — SIGNIFICANT CHANGE UP (ref 1.6–2.6)
MAGNESIUM SERPL-MCNC: 2.1 MG/DL — SIGNIFICANT CHANGE UP (ref 1.6–2.6)
MCHC RBC-ENTMCNC: 31.6 PG — SIGNIFICANT CHANGE UP (ref 27–34)
MCHC RBC-ENTMCNC: 32.8 PG — SIGNIFICANT CHANGE UP (ref 27–34)
MCHC RBC-ENTMCNC: 34.5 GM/DL — SIGNIFICANT CHANGE UP (ref 32–36)
MCHC RBC-ENTMCNC: 36.7 GM/DL — HIGH (ref 32–36)
MCV RBC AUTO: 89.2 FL — SIGNIFICANT CHANGE UP (ref 80–100)
MCV RBC AUTO: 91.4 FL — SIGNIFICANT CHANGE UP (ref 80–100)
MRSA PCR RESULT.: SIGNIFICANT CHANGE UP
NITRITE UR-MCNC: NEGATIVE — SIGNIFICANT CHANGE UP
NRBC # BLD: 0 /100 WBCS — SIGNIFICANT CHANGE UP (ref 0–0)
NRBC # BLD: 0 /100 WBCS — SIGNIFICANT CHANGE UP (ref 0–0)
PH UR: 7.5 — SIGNIFICANT CHANGE UP (ref 5–8)
PHOSPHATE SERPL-MCNC: 1.3 MG/DL — LOW (ref 2.5–4.5)
PHOSPHATE SERPL-MCNC: 4.1 MG/DL — SIGNIFICANT CHANGE UP (ref 2.5–4.5)
PLATELET # BLD AUTO: 166 K/UL — SIGNIFICANT CHANGE UP (ref 150–400)
PLATELET # BLD AUTO: 177 K/UL — SIGNIFICANT CHANGE UP (ref 150–400)
POTASSIUM SERPL-MCNC: 3.7 MMOL/L — SIGNIFICANT CHANGE UP (ref 3.5–5.3)
POTASSIUM SERPL-MCNC: 3.7 MMOL/L — SIGNIFICANT CHANGE UP (ref 3.5–5.3)
POTASSIUM SERPL-SCNC: 3.7 MMOL/L — SIGNIFICANT CHANGE UP (ref 3.5–5.3)
POTASSIUM SERPL-SCNC: 3.7 MMOL/L — SIGNIFICANT CHANGE UP (ref 3.5–5.3)
PROT SERPL-MCNC: 6.3 G/DL — SIGNIFICANT CHANGE UP (ref 6–8.3)
PROT SERPL-MCNC: 6.4 G/DL — SIGNIFICANT CHANGE UP (ref 6–8.3)
PROT UR-MCNC: NEGATIVE MG/DL — SIGNIFICANT CHANGE UP
PROTHROM AB SERPL-ACNC: 19.3 SEC — HIGH (ref 9.5–13)
PROTHROM AB SERPL-ACNC: 21.6 SEC — HIGH (ref 9.5–13)
RBC # BLD: 4.27 M/UL — SIGNIFICANT CHANGE UP (ref 4.2–5.8)
RBC # BLD: 4.31 M/UL — SIGNIFICANT CHANGE UP (ref 4.2–5.8)
RBC # FLD: 14.3 % — SIGNIFICANT CHANGE UP (ref 10.3–14.5)
RBC # FLD: 15.5 % — HIGH (ref 10.3–14.5)
S AUREUS DNA NOSE QL NAA+PROBE: SIGNIFICANT CHANGE UP
SODIUM SERPL-SCNC: 137 MMOL/L — SIGNIFICANT CHANGE UP (ref 135–145)
SODIUM SERPL-SCNC: 137 MMOL/L — SIGNIFICANT CHANGE UP (ref 135–145)
SP GR SPEC: 1.01 — SIGNIFICANT CHANGE UP (ref 1–1.03)
UROBILINOGEN FLD QL: 0.2 MG/DL — SIGNIFICANT CHANGE UP (ref 0.2–1)
WBC # BLD: 16.69 K/UL — HIGH (ref 3.8–10.5)
WBC # BLD: 17.93 K/UL — HIGH (ref 3.8–10.5)
WBC # FLD AUTO: 16.69 K/UL — HIGH (ref 3.8–10.5)
WBC # FLD AUTO: 17.93 K/UL — HIGH (ref 3.8–10.5)

## 2024-06-02 PROCEDURE — 71045 X-RAY EXAM CHEST 1 VIEW: CPT | Mod: 26

## 2024-06-02 PROCEDURE — 99232 SBSQ HOSP IP/OBS MODERATE 35: CPT

## 2024-06-02 PROCEDURE — 99232 SBSQ HOSP IP/OBS MODERATE 35: CPT | Mod: GC

## 2024-06-02 RX ORDER — THIAMINE MONONITRATE (VIT B1) 100 MG
100 TABLET ORAL DAILY
Refills: 0 | Status: DISCONTINUED | OUTPATIENT
Start: 2024-06-02 | End: 2024-06-04

## 2024-06-02 RX ORDER — POTASSIUM PHOSPHATE, MONOBASIC POTASSIUM PHOSPHATE, DIBASIC 236; 224 MG/ML; MG/ML
30 INJECTION, SOLUTION INTRAVENOUS ONCE
Refills: 0 | Status: COMPLETED | OUTPATIENT
Start: 2024-06-02 | End: 2024-06-02

## 2024-06-02 RX ORDER — NICOTINE POLACRILEX 2 MG
1 GUM BUCCAL DAILY
Refills: 0 | Status: DISCONTINUED | OUTPATIENT
Start: 2024-06-02 | End: 2024-06-11

## 2024-06-02 RX ORDER — PIPERACILLIN AND TAZOBACTAM 4; .5 G/20ML; G/20ML
3.38 INJECTION, POWDER, LYOPHILIZED, FOR SOLUTION INTRAVENOUS ONCE
Refills: 0 | Status: COMPLETED | OUTPATIENT
Start: 2024-06-02 | End: 2024-06-02

## 2024-06-02 RX ORDER — ACETYLCYSTEINE 200 MG/ML
12 VIAL (ML) MISCELLANEOUS EVERY 24 HOURS
Refills: 0 | Status: DISCONTINUED | OUTPATIENT
Start: 2024-06-02 | End: 2024-06-03

## 2024-06-02 RX ORDER — POLYETHYLENE GLYCOL 3350 17 G/17G
17 POWDER, FOR SOLUTION ORAL EVERY 12 HOURS
Refills: 0 | Status: DISCONTINUED | OUTPATIENT
Start: 2024-06-02 | End: 2024-06-05

## 2024-06-02 RX ORDER — PIPERACILLIN AND TAZOBACTAM 4; .5 G/20ML; G/20ML
3.38 INJECTION, POWDER, LYOPHILIZED, FOR SOLUTION INTRAVENOUS EVERY 8 HOURS
Refills: 0 | Status: DISCONTINUED | OUTPATIENT
Start: 2024-06-02 | End: 2024-06-04

## 2024-06-02 RX ORDER — POTASSIUM CHLORIDE 20 MEQ
10 PACKET (EA) ORAL
Refills: 0 | Status: COMPLETED | OUTPATIENT
Start: 2024-06-02 | End: 2024-06-02

## 2024-06-02 RX ADMIN — LACTULOSE 20 GRAM(S): 10 SOLUTION ORAL at 14:41

## 2024-06-02 RX ADMIN — Medication 1 PATCH: at 18:34

## 2024-06-02 RX ADMIN — DEXMEDETOMIDINE HYDROCHLORIDE IN 0.9% SODIUM CHLORIDE 3.98 MICROGRAM(S)/KG/HR: 4 INJECTION INTRAVENOUS at 03:15

## 2024-06-02 RX ADMIN — PIPERACILLIN AND TAZOBACTAM 200 GRAM(S): 4; .5 INJECTION, POWDER, LYOPHILIZED, FOR SOLUTION INTRAVENOUS at 10:32

## 2024-06-02 RX ADMIN — DEXMEDETOMIDINE HYDROCHLORIDE IN 0.9% SODIUM CHLORIDE 3.98 MICROGRAM(S)/KG/HR: 4 INJECTION INTRAVENOUS at 08:13

## 2024-06-02 RX ADMIN — Medication 2 MILLIGRAM(S): at 18:34

## 2024-06-02 RX ADMIN — QUETIAPINE FUMARATE 50 MILLIGRAM(S): 200 TABLET, FILM COATED ORAL at 05:11

## 2024-06-02 RX ADMIN — Medication 100 MILLIEQUIVALENT(S): at 09:21

## 2024-06-02 RX ADMIN — Medication 2 MILLIGRAM(S): at 16:54

## 2024-06-02 RX ADMIN — HEPARIN SODIUM 5000 UNIT(S): 5000 INJECTION INTRAVENOUS; SUBCUTANEOUS at 18:26

## 2024-06-02 RX ADMIN — Medication 1 PATCH: at 08:18

## 2024-06-02 RX ADMIN — CHLORHEXIDINE GLUCONATE 1 APPLICATION(S): 213 SOLUTION TOPICAL at 05:12

## 2024-06-02 RX ADMIN — POTASSIUM PHOSPHATE, MONOBASIC POTASSIUM PHOSPHATE, DIBASIC 83.33 MILLIMOLE(S): 236; 224 INJECTION, SOLUTION INTRAVENOUS at 22:30

## 2024-06-02 RX ADMIN — Medication 1 TABLET(S): at 14:41

## 2024-06-02 RX ADMIN — AMLODIPINE BESYLATE 5 MILLIGRAM(S): 2.5 TABLET ORAL at 05:14

## 2024-06-02 RX ADMIN — QUETIAPINE FUMARATE 50 MILLIGRAM(S): 200 TABLET, FILM COATED ORAL at 18:26

## 2024-06-02 RX ADMIN — PANTOPRAZOLE SODIUM 40 MILLIGRAM(S): 20 TABLET, DELAYED RELEASE ORAL at 06:50

## 2024-06-02 RX ADMIN — Medication 1 PATCH: at 04:50

## 2024-06-02 RX ADMIN — Medication 255 MILLIMOLE(S): at 18:32

## 2024-06-02 RX ADMIN — Medication 255 MILLIMOLE(S): at 11:54

## 2024-06-02 RX ADMIN — LACTULOSE 20 GRAM(S): 10 SOLUTION ORAL at 21:32

## 2024-06-02 RX ADMIN — Medication 44.17 GRAM(S): at 16:07

## 2024-06-02 RX ADMIN — Medication 2 MILLIGRAM(S): at 10:50

## 2024-06-02 RX ADMIN — Medication 2 MILLIGRAM(S): at 23:15

## 2024-06-02 RX ADMIN — Medication 100 MILLIGRAM(S): at 14:41

## 2024-06-02 RX ADMIN — LACTULOSE 20 GRAM(S): 10 SOLUTION ORAL at 05:12

## 2024-06-02 RX ADMIN — DEXMEDETOMIDINE HYDROCHLORIDE IN 0.9% SODIUM CHLORIDE 3.98 MICROGRAM(S)/KG/HR: 4 INJECTION INTRAVENOUS at 05:14

## 2024-06-02 RX ADMIN — Medication 1 MILLIGRAM(S): at 14:41

## 2024-06-02 RX ADMIN — PIPERACILLIN AND TAZOBACTAM 25 GRAM(S): 4; .5 INJECTION, POWDER, LYOPHILIZED, FOR SOLUTION INTRAVENOUS at 21:32

## 2024-06-02 RX ADMIN — POLYETHYLENE GLYCOL 3350 17 GRAM(S): 17 POWDER, FOR SOLUTION ORAL at 18:26

## 2024-06-02 RX ADMIN — Medication 100 MILLIEQUIVALENT(S): at 08:13

## 2024-06-02 RX ADMIN — Medication 2 MILLIGRAM(S): at 02:57

## 2024-06-02 RX ADMIN — LACTULOSE 20 GRAM(S): 10 SOLUTION ORAL at 18:26

## 2024-06-02 RX ADMIN — LACTULOSE 20 GRAM(S): 10 SOLUTION ORAL at 02:55

## 2024-06-02 RX ADMIN — HEPARIN SODIUM 5000 UNIT(S): 5000 INJECTION INTRAVENOUS; SUBCUTANEOUS at 05:12

## 2024-06-02 RX ADMIN — Medication 100 MILLIEQUIVALENT(S): at 06:51

## 2024-06-02 NOTE — PROGRESS NOTE ADULT - ASSESSMENT
38M PMH HTN, ETOH use (daily drink) PSH gastric sleeve, initially admitted to [facility] for jaundice and confusion, found to have elevated transaminases to 4000s with high MELD and withdrawal symptoms, transferred to [facility] for liver transplant workup.     [] Acute alc hep  [] ETOH Cirrhosis   - LFTs trending down  - Cont NAC gtt until INR < 1.5 and ALT < 1000  - Delirium: ?Alcohol withdrawal; ativan per [facility] protocol  - HE: cont lactulose, add rifaximin  - Rising WBC: bld cxs, start zosyn  - abs bid

## 2024-06-02 NOTE — PROGRESS NOTE ADULT - ASSESSMENT
Impression:   38M PMH HTN, ETOH use (daily drink) PSH gastric sleeve, initially admitted to [facility] for jaundice and confusion, found to have elevated transaminases to 4000s with high MELD and withdrawal symptoms, transferred to [facility] for liver transplant workup.     #Acute liver failure in the setting of decompensated alcoholic cirrhosis  - Calculated MELD 3 score on 6/1 - 27. Presented w/ elevated liver enzymes, predominantly hepatocellular pattern, now AST/ALT trending down, w/ bili rising. INR has been slowly trending down. Ammonia level stable at 62.   - hepatitis B surface antibody, hepatitis B surface antigen, hepatitis core antibody, hepatitis C NR   - EV: No prior EGD.   - CT chest/ A/P with diffuse, marked hepatic steatosis   - CT head with no acute intracranial hemorrhage, mass effect or midline shift.  - MERLY negative, anti-mitochondrial antibody <1:20, anti-smooth muscle antibody <1:20, immunoglobulins (IgG 1022, IgM 122, IgA quantitative 502) for autoimmune etiologies. Utox neg.   - infectious work up neg - CTX d/c on 6/1.     #Acute encephalopathy in the setting of MARKIE   - Currently he is agitated, has  been on precedex, and getting intermittent doses of Ativan which keeps him calm.   - Still confused, more agitated, concerned for DT vs. HE, refusing lactulose and no BM in 2 days.     Recommendations:   - continue with NAC drip for now.    - Please obtain infectious work up with CXR, blood cultures and UA since his WBC is increasing, could be inflammatory but patient is high risk for infections given his liver disease.   - Please start him on Zosyn for empiric treatment.   - Recommend benzo for possible alcohol withdrawal  - Continue with lactulose and rifaximin for HE, goal 3-5 BMs per day.   - Please obtain daily lactate and ammonia levels.   - Daily MELD labs.     Discussed the case with Dr. Beth.    All recommendations are tentative until note is attested by an attending.     Sandra Villanueva, PGY-5  Gastroenterology/Hepatology Fellow  Available on [employer] Teams  [phone] (Short Range Pager)  [phone] (Long Range Pager)    After 5pm, please contact the on-call GI fellow.

## 2024-06-02 NOTE — PROGRESS NOTE ADULT - SUBJECTIVE AND OBJECTIVE BOX
Transplant Surgery - Multidisciplinary Rounds  --------------------------------------------------------------  Present:   Patient seen and examined with multidisciplinary Transplant team including Surgeon: Dr Amaya, Hepatologist: Dr Beth,  CROW Albarran, and beside RN during AM rounds. Disciplines not in attendance will be notified of the plan.     Interval Events:   - agitated on precedex  - remains on 1:1   - started seroquel  - Awake, not following commands  - rising WBC, afebrile     Potential Discharge date: TBD  Education:  Medications  Plan of care:  See Below    MEDICATIONS  (STANDING):  acetylcysteine IVPB 12 Gram(s) IV Intermittent every 24 hours  amLODIPine   Tablet 5 milliGRAM(s) Oral daily  chlorhexidine 2% Cloths 1 Application(s) Topical <User Schedule>  dexMEDEtomidine Infusion 0.2 MICROgram(s)/kG/Hr (3.98 mL/Hr) IV Continuous <Continuous>  folic acid 1 milliGRAM(s) Oral daily  heparin   Injectable 5000 Unit(s) SubCutaneous every 12 hours  insulin lispro (ADMELOG) corrective regimen sliding scale   SubCutaneous three times a day before meals  insulin lispro (ADMELOG) corrective regimen sliding scale   SubCutaneous at bedtime  lactulose Syrup 20 Gram(s) Oral every 4 hours  LORazepam   Injectable 2 milliGRAM(s) IV Push every 4 hours  multivitamin 1 Tablet(s) Oral daily  nicotine - 21 mG/24Hr(s) Patch 1 Patch Transdermal daily  pantoprazole    Tablet 40 milliGRAM(s) Oral before breakfast  piperacillin/tazobactam IVPB.. 3.375 Gram(s) IV Intermittent every 8 hours  polyethylene glycol 3350 17 Gram(s) Oral every 12 hours  potassium phosphate IVPB 30 milliMole(s) IV Intermittent once  QUEtiapine 50 milliGRAM(s) Oral every 12 hours  rifAXIMin 550 milliGRAM(s) Oral two times a day  sodium phosphate 30 milliMole(s)/500 mL IVPB 30 milliMole(s) IV Intermittent once  thiamine Injectable 100 milliGRAM(s) IV Push daily    MEDICATIONS  (PRN):  LORazepam   Injectable 2 milliGRAM(s) IV Push every 1 hour PRN CIWA-Ar score 8 or greater      PAST MEDICAL & SURGICAL HISTORY:  Hypertension, unspecified type  H/O gastric sleeve    Vital Signs Last 24 Hrs  T(C): 36.6 (02 Jun 2024 08:00), Max: 37.3 (01 Jun 2024 19:00)  T(F): 97.9 (02 Jun 2024 08:00), Max: 99.2 (01 Jun 2024 19:00)  HR: 71 (02 Jun 2024 14:00) (69 - 85)  BP: 148/92 (02 Jun 2024 14:00) (122/79 - 165/88)  BP(mean): 115 (02 Jun 2024 14:00) (90 - 120)  RR: 18 (02 Jun 2024 14:00) (16 - 29)  SpO2: 96% (02 Jun 2024 14:00) (93% - 98%)    Parameters below as of 02 Jun 2024 03:00  Patient On (Oxygen Delivery Method): room air    I&O's Summary    01 Jun 2024 07:01  -  02 Jun 2024 07:00  --------------------------------------------------------  IN: 2977.6 mL / OUT: 4280 mL / NET: -1302.4 mL    02 Jun 2024 07:01  -  02 Jun 2024 16:08  --------------------------------------------------------  IN: 118.4 mL / OUT: 1250 mL / NET: -1131.6 mL                        14.0   16.69 )-----------( 166      ( 02 Jun 2024 05:17 )             38.1     06-02    137  |  105  |  10  ----------------------------<  129<H>  3.7   |  19<L>  |  0.52    Ca    9.4      02 Jun 2024 05:17  Phos  1.3     06-02  Mg     2.1     06-02    TPro  6.3  /  Alb  3.6  /  TBili  17.3<H>  /  DBili  x   /  AST  87<H>  /  ALT  764<H>  /  AlkPhos  276<H>  06-02    Culture - Urine (collected 05-29-24 @ 17:16)  Source: Clean Catch Clean Catch (Midstream)  Final Report (05-30-24 @ 14:02):    <10,000 CFU/mL Normal Urogenital Aviva    Culture - Blood (collected 05-29-24 @ 13:40)  Source: .Blood Blood-Peripheral  Preliminary Report (06-01-24 @ 18:02):    No growth at 72 Hours    Culture - Blood (collected 05-29-24 @ 13:30)  Source: .Blood Blood-Peripheral  Preliminary Report (06-01-24 @ 18:02):    No growth at 72 Hours    Culture - Blood (collected 05-29-24 @ 12:00)  Source: .Blood Blood  Preliminary Report (06-01-24 @ 17:01):    No growth at 72 Hours    Culture - Blood (collected 05-29-24 @ 11:50)  Source: .Blood Blood  Preliminary Report (06-01-24 @ 17:01):    No growth at 72 Hours      Review of systems  unable to obtain, AMS      PHYSICAL EXAM:  GENERAL: Patient appears confused  HEENT:  NCAT, + scleral icterus  CHEST: no resp distress  HEART:  RRR  ABDOMEN:  Soft, non-tender, non-distended, no masses  EXTREMITIES:  No cyanosis, clubbing, or edema  SKIN:  No rash/erythema/ecchymoses/petechiae/wounds/abscess/warm/dry  NEURO:  Alert and oriented x 1

## 2024-06-02 NOTE — PROGRESS NOTE ADULT - SUBJECTIVE AND OBJECTIVE BOX
Gastroenterology/Hepatology Progress Note      Interval Events:   - Patient was agitated yesterday despite being on max dose of precedex, currently on physical restraints, remains 1:1.   - Receive a dose of Seroquel 50 mg overnight.   - This morning, patient is not answering questions appropriately, reports he wants to leave.   - No bowel movements for 2 days. Received one dose of lactulose yesterday.     Allergies:  No Known Allergies      Hospital Medications:  acetylcysteine IVPB 12 Gram(s) IV Intermittent every 24 hours  amLODIPine   Tablet 5 milliGRAM(s) Oral daily  chlorhexidine 2% Cloths 1 Application(s) Topical <User Schedule>  dexMEDEtomidine Infusion 0.2 MICROgram(s)/kG/Hr IV Continuous <Continuous>  folic acid 1 milliGRAM(s) Oral daily  heparin   Injectable 5000 Unit(s) SubCutaneous every 12 hours  insulin lispro (ADMELOG) corrective regimen sliding scale   SubCutaneous three times a day before meals  insulin lispro (ADMELOG) corrective regimen sliding scale   SubCutaneous at bedtime  lactulose Syrup 20 Gram(s) Oral every 4 hours  LORazepam   Injectable 2 milliGRAM(s) IV Push every 1 hour PRN  LORazepam   Injectable 2 milliGRAM(s) IV Push every 4 hours  multivitamin 1 Tablet(s) Oral daily  nicotine - 21 mG/24Hr(s) Patch 1 Patch Transdermal daily  pantoprazole    Tablet 40 milliGRAM(s) Oral before breakfast  piperacillin/tazobactam IVPB.- 3.375 Gram(s) IV Intermittent once  piperacillin/tazobactam IVPB.. 3.375 Gram(s) IV Intermittent every 8 hours  polyethylene glycol 3350 17 Gram(s) Oral every 12 hours  potassium phosphate IVPB 30 milliMole(s) IV Intermittent once  QUEtiapine 50 milliGRAM(s) Oral every 12 hours  sodium phosphate 30 milliMole(s)/500 mL IVPB 30 milliMole(s) IV Intermittent once  thiamine Injectable 100 milliGRAM(s) IV Push daily      ROS: 14 point ROS negative unless otherwise state in subjective    PHYSICAL EXAM:   Vital Signs:  Vital Signs Last 24 Hrs  T(C): 36.6 (2024 08:00), Max: 37.3 (2024 19:00)  T(F): 97.9 (2024 08:00), Max: 99.2 (2024 19:00)  HR: 72 (2024 11:00) (69 - 85)  BP: 131/88 (2024 11:00) (117/69 - 165/88)  BP(mean): 105 (2024 11:00) (88 - 123)  RR: 16 (2024 11:00) (16 - 29)  SpO2: 96% (2024 11:00) (93% - 99%)    Parameters below as of 2024 03:00  Patient On (Oxygen Delivery Method): room air      Daily     Daily     GENERAL:  critically ill appearing, lethargic, sitting on the bed in restraints.   HEENT:  NCAT, + scleral icterus  CHEST: no resp distress  HEART:  RRR  ABDOMEN:  Soft, non-tender, non-distended,   EXTREMITIES:  No LE edema b/l  SKIN:  Jaundiced  NEURO:  Somnolent, able to wake up intermittently, but not answering questions appropriately and does not follow commands.     LABS:                        14.0   16.69 )-----------( 166      ( 2024 05:17 )             38.1     Mean Cell Volume: 89.2 fl (24 @ 05:17)    06-02    137  |  105  |  10  ----------------------------<  129<H>  3.7   |  19<L>  |  0.52    Ca    9.4      2024 05:17  Phos  1.3     06-02  Mg     2.1     06-02    TPro  6.3  /  Alb  3.6  /  TBili  17.3<H>  /  DBili  x   /  AST  87<H>  /  ALT  764<H>  /  AlkPhos  276<H>  06-    LIVER FUNCTIONS - ( 2024 05:17 )  Alb: 3.6 g/dL / Pro: 6.3 g/dL / ALK PHOS: 276 U/L / ALT: 764 U/L / AST: 87 U/L / GGT: x           PT/INR - ( 2024 05:17 )   PT: 21.6 sec;   INR: 2.00 ratio         PTT - ( 2024 05:17 )  PTT:27.6 sec  Urinalysis Basic - ( 2024 09:32 )    Color: Dark Yellow / Appearance: Clear / S.007 / pH: x  Gluc: x / Ketone: Negative mg/dL  / Bili: Moderate / Urobili: 0.2 mg/dL   Blood: x / Protein: Negative mg/dL / Nitrite: Negative   Leuk Esterase: Negative / RBC: x / WBC x   Sq Epi: x / Non Sq Epi: x / Bacteria: x      Amylase Serum--      Lipase serum--       [id number]  Amylase Serum--      Lipase serum--       [id number]        Imaging: See Report       Gastroenterology/Hepatology Progress Note      Interval Events:   - Patient was agitated yesterday despite being on max dose of precedex, currently on physical restraints, remains 1:1.   - Receive a dose of Seroquel 50 mg overnight.   - This morning, patient is not answering questions appropriately, reports he wants to leave.   - No bowel movements for 2 days. Received one dose of lactulose yesterday.     Allergies:  No Known Allergies      Hospital Medications:  acetylcysteine IVPB 12 Gram(s) IV Intermittent every 24 hours  amLODIPine   Tablet 5 milliGRAM(s) Oral daily  chlorhexidine 2% Cloths 1 Application(s) Topical <User Schedule>  dexMEDEtomidine Infusion 0.2 MICROgram(s)/kG/Hr IV Continuous <Continuous>  folic acid 1 milliGRAM(s) Oral daily  heparin   Injectable 5000 Unit(s) SubCutaneous every 12 hours  insulin lispro (ADMELOG) corrective regimen sliding scale   SubCutaneous three times a day before meals  insulin lispro (ADMELOG) corrective regimen sliding scale   SubCutaneous at bedtime  lactulose Syrup 20 Gram(s) Oral every 4 hours  LORazepam   Injectable 2 milliGRAM(s) IV Push every 1 hour PRN  LORazepam   Injectable 2 milliGRAM(s) IV Push every 4 hours  multivitamin 1 Tablet(s) Oral daily  nicotine - 21 mG/24Hr(s) Patch 1 Patch Transdermal daily  pantoprazole    Tablet 40 milliGRAM(s) Oral before breakfast  piperacillin/tazobactam IVPB.- 3.375 Gram(s) IV Intermittent once  piperacillin/tazobactam IVPB.. 3.375 Gram(s) IV Intermittent every 8 hours  polyethylene glycol 3350 17 Gram(s) Oral every 12 hours  potassium phosphate IVPB 30 milliMole(s) IV Intermittent once  QUEtiapine 50 milliGRAM(s) Oral every 12 hours  sodium phosphate 30 milliMole(s)/500 mL IVPB 30 milliMole(s) IV Intermittent once  thiamine Injectable 100 milliGRAM(s) IV Push daily      ROS:  Unable to obtain due to AMS    PHYSICAL EXAM:   Vital Signs:  Vital Signs Last 24 Hrs  T(C): 36.6 (2024 08:00), Max: 37.3 (2024 19:00)  T(F): 97.9 (2024 08:00), Max: 99.2 (2024 19:00)  HR: 72 (2024 11:00) (69 - 85)  BP: 131/88 (2024 11:00) (117/69 - 165/88)  BP(mean): 105 (2024 11:00) (88 - 123)  RR: 16 (2024 11:00) (16 - 29)  SpO2: 96% (2024 11:00) (93% - 99%)    Parameters below as of 2024 03:00  Patient On (Oxygen Delivery Method): room air      Daily     Daily     GENERAL:  ill appearing, lethargic, sitting on the bed in vest restraints.   HEENT:  NCAT, + scleral icterus  CHEST: no resp distress  HEART:  RRR  ABDOMEN:  Soft, non-tender, non-distended,   EXTREMITIES:  No LE edema b/l  SKIN:  Jaundiced  NEURO:  Somnolent, able to wake up intermittently, but not answering questions appropriately and does not follow commands, unable to assess for asterixis, PERRL.     LABS:                        14.0   16.69 )-----------( 166      ( 2024 05:17 )             38.1     Mean Cell Volume: 89.2 fl (-24 @ 05:17)    06-    137  |  105  |  10  ----------------------------<  129<H>  3.7   |  19<L>  |  0.52    Ca    9.4      2024 05:17  Phos  1.3     06-  Mg     2.1     -    TPro  6.3  /  Alb  3.6  /  TBili  17.3<H>  /  DBili  x   /  AST  87<H>  /  ALT  764<H>  /  AlkPhos  276<H>  -    LIVER FUNCTIONS - ( 2024 05:17 )  Alb: 3.6 g/dL / Pro: 6.3 g/dL / ALK PHOS: 276 U/L / ALT: 764 U/L / AST: 87 U/L / GGT: x           PT/INR - ( 2024 05:17 )   PT: 21.6 sec;   INR: 2.00 ratio         PTT - ( 2024 05:17 )  PTT:27.6 sec  Urinalysis Basic - ( 2024 09:32 )    Color: Dark Yellow / Appearance: Clear / S.007 / pH: x  Gluc: x / Ketone: Negative mg/dL  / Bili: Moderate / Urobili: 0.2 mg/dL   Blood: x / Protein: Negative mg/dL / Nitrite: Negative   Leuk Esterase: Negative / RBC: x / WBC x   Sq Epi: x / Non Sq Epi: x / Bacteria: x      Amylase Serum--      Lipase serum--       [id number]  Amylase Serum--      Lipase serum--       [id number]        Imaging: See Report

## 2024-06-02 NOTE — PROGRESS NOTE ADULT - NS ATTEND AMEND GEN_ALL_CORE FT
38M with AUD admitted with acute liver failure, MELD 34  +recent etoh use and tetracycline  +AMS and agitation overnight. Start ativan prn for possible withdrawal  liver function continues to improve. No need for biopsy at this time  Cont abx, NAC until INR <2  mgmt as per SICU and hepatology

## 2024-06-02 NOTE — PROGRESS NOTE ADULT - ASSESSMENT
38M PMH HTN, ETOH use (daily drink) PSH gastric sleeve, initially admitted to [facility] for jaundice and confusion, BIBEMS as pt was wondering the streets. Noted to have significantly elevated LFTs (4000s), MELD 47, high CIWA, transfer for liver evaluation.    PLAN  NEURO  - Pain: None  - Sedation: Precedex  - Lorazepam PRN for CIWA  - Trend ammonia  - Lactulose for hepatic encephalopathy     RESP  - Maintain O2 saturation >92%  - AM CXRs    CV  - MAP goal >65  - Pressors: None  - 5/29 TTE ordered    GI/NUTRITION  - Diet: Reg  - Protonix IV daily  - Lactulose  - Acetylcysteine gtt     RENAL/  - Strict I&Os  - albumin 50 q8    HEME  - DVT ppx: SQH    ID  - ABX: CTX (5/29-)  - 5/29: BCx - results pending  - 5/29: +UA, UCx pending  ENDO  - Monitor blood glucose     LINES  - PIVs    CODE: FULL  DISPO: SICU

## 2024-06-02 NOTE — PROGRESS NOTE ADULT - SUBJECTIVE AND OBJECTIVE BOX
SICU Daily Progress Note  =====================================================    Interval events  - NAC gtt plan to stop when INR 1.0        Allergies: No Known Allergies      MEDICATIONS:   --------------------------------------------------------------------------------------  Neurologic Medications  dexMEDEtomidine Infusion 0.2 MICROgram(s)/kG/Hr IV Continuous <Continuous>  LORazepam   Injectable 2 milliGRAM(s) IV Push every 1 hour PRN CIWA-Ar score 8 or greater  LORazepam   Injectable 2 milliGRAM(s) IV Push every 4 hours  QUEtiapine 50 milliGRAM(s) Oral every 12 hours    Respiratory Medications    Cardiovascular Medications  amLODIPine   Tablet 5 milliGRAM(s) Oral daily    Gastrointestinal Medications  folic acid 1 milliGRAM(s) Oral daily  lactulose Syrup 20 Gram(s) Oral every 4 hours  multivitamin 1 Tablet(s) Oral daily  pantoprazole    Tablet 40 milliGRAM(s) Oral before breakfast  thiamine 100 milliGRAM(s) Oral daily    Genitourinary Medications    Hematologic/Oncologic Medications  heparin   Injectable 5000 Unit(s) SubCutaneous every 12 hours    Antimicrobial/Immunologic Medications    Endocrine/Metabolic Medications  insulin lispro (ADMELOG) corrective regimen sliding scale   SubCutaneous three times a day before meals  insulin lispro (ADMELOG) corrective regimen sliding scale   SubCutaneous at bedtime    Topical/Other Medications  acetylcysteine IVPB 12 Gram(s) IV Intermittent every 24 hours  chlorhexidine 2% Cloths 1 Application(s) Topical <User Schedule>    --------------------------------------------------------------------------------------    VITAL SIGNS, INS/OUTS (last 24 hours):  --------------------------------------------------------------------------------------  T(C): 37.2 (06-01-24 @ 23:00), Max: 37.3 (06-01-24 @ 19:00)  HR: 73 (06-02-24 @ 00:00) (65 - 87)  BP: 165/88 (06-02-24 @ 00:00) (111/72 - 176/104)  BP(mean): 120 (06-02-24 @ 00:00) (84 - 135)  ABP: --  ABP(mean): --  RR: 24 (06-02-24 @ 00:00) (15 - 29)  SpO2: 94% (06-02-24 @ 00:00) (92% - 100%)  CVP(mm Hg): --  CI: --  CAPILLARY BLOOD GLUCOSE      POCT Blood Glucose.: 150 mg/dL (01 Jun 2024 22:02)  POCT Blood Glucose.: 177 mg/dL (01 Jun 2024 16:48)  POCT Blood Glucose.: 110 mg/dL (01 Jun 2024 11:18)   N/A    05-31 @ 07:01  -  06-01 @ 07:00  --------------------------------------------------------  IN:    Albumin 25%  -  50 mL: 100 mL    Dexmedetomidine: 580.4 mL    IV PiggyBack: 750 mL    IV PiggyBack: 1058.4 mL    Oral Fluid: 530 mL  Total IN: 3018.8 mL    OUT:    Voided (mL): 5850 mL  Total OUT: 5850 mL    Total NET: -2831.2 mL      06-01 @ 07:01  -  06-02 @ 00:37  --------------------------------------------------------  IN:    Dexmedetomidine: 592 mL    IV PiggyBack: 749.7 mL    Oral Fluid: 1020 mL  Total IN: 2361.7 mL    OUT:    Voided (mL): 3380 mL  Total OUT: 3380 mL    Total NET: -1018.3 mL        --------------------------------------------------------------------------------------    EXAM  NEUROLOGY: Agitated, no focal deficits.  HEENT: Normocephalic, atraumatic, EOMI.   RESPIRATORY: Lungs clear to auscultation, Normal expansion/effort.   -- Mechanical Ventilation:   CARDIOVASCULAR: S1, S2.  Regular rate and rhythm.   GI/NUTRITION: Abdomen soft, Non-tender, Non-distended.   VASCULAR: Extremities warm, pink, well-perfused.  MSK: All extremities moving spontaneously without limitations.  SKIN: Good skin turgor, no skin breakdown.   LINES:      LABS  --------------------------------------------------------------------------------------  CBC (06-01 @ 07:30)                              14.1                           11.16<H>  )----------------(  170        --    % Neutrophils, --    % Lymphocytes, ANC: --                                  39.8                  BMP (06-01 @ 17:21)             138     |  105     |  11    		Ca++ --      Ca 9.3                ---------------------------------( 155<H>		Mg 2.2                3.7     |  19<L>   |  0.53  			Ph 1.8<L>  BMP (06-01 @ 07:30)             138     |  104     |  9     		Ca++ --      Ca 10.0               ---------------------------------( 172<H>		Mg 2.2                3.6     |  20<L>   |  0.46<L>			Ph 1.5<L>    LFTs (06-01 @ 17:21)      TPro 6.0 / Alb 3.6 / TBili 16.0<H> / DBili -- / AST 77<H> / <H> / AlkPhos 260<H>  LFTs (06-01 @ 07:30)      TPro 6.6 / Alb 3.9 / TBili 16.1<H> / DBili -- / AST 97<H> / ALT 1045<H> / AlkPhos 275<H>    Coags (06-01 @ 07:30)  aPTT 28.0 / INR 2.09<H> / PT 21.5<H>        -> Clean Catch Clean Catch (Midstream) Culture (05-29 @ 17:16)     NG    NG    <10,000 CFU/mL Normal Urogenital Aviva    -> .Blood Blood-Peripheral Culture (05-29 @ 13:40)     NG    NG    No growth at 72 Hours    -> .Blood Blood-Peripheral Culture (05-29 @ 13:30)     NG    NG    No growth at 72 Hours    -> .Blood Blood Culture (05-29 @ 12:00)     NG    NG    No growth at 72 Hours    -> .Blood Blood Culture (05-29 @ 11:50)     NG    NG    No growth at 72 Hours      --------------------------------------------------------------------------------------    IMAGING STUDIES:

## 2024-06-03 LAB
ALBUMIN SERPL ELPH-MCNC: 2.9 G/DL — LOW (ref 3.3–5)
ALP SERPL-CCNC: 238 U/L — HIGH (ref 40–120)
ALT FLD-CCNC: 438 U/L — HIGH (ref 10–45)
ANION GAP SERPL CALC-SCNC: 13 MMOL/L — SIGNIFICANT CHANGE UP (ref 5–17)
AST SERPL-CCNC: 77 U/L — HIGH (ref 10–40)
BILIRUB SERPL-MCNC: 16.7 MG/DL — HIGH (ref 0.2–1.2)
BUN SERPL-MCNC: 9 MG/DL — SIGNIFICANT CHANGE UP (ref 7–23)
C IMMITIS AB SER QL IA: NEGATIVE — SIGNIFICANT CHANGE UP
CALCIUM SERPL-MCNC: 8.3 MG/DL — LOW (ref 8.4–10.5)
CHLORIDE SERPL-SCNC: 111 MMOL/L — HIGH (ref 96–108)
CO2 SERPL-SCNC: 17 MMOL/L — LOW (ref 22–31)
CREAT SERPL-MCNC: 0.57 MG/DL — SIGNIFICANT CHANGE UP (ref 0.5–1.3)
CULTURE RESULTS: SIGNIFICANT CHANGE UP
EGFR: 130 ML/MIN/1.73M2 — SIGNIFICANT CHANGE UP
GLUCOSE BLDC GLUCOMTR-MCNC: 112 MG/DL — HIGH (ref 70–99)
GLUCOSE BLDC GLUCOMTR-MCNC: 118 MG/DL — HIGH (ref 70–99)
GLUCOSE BLDC GLUCOMTR-MCNC: 204 MG/DL — HIGH (ref 70–99)
GLUCOSE BLDC GLUCOMTR-MCNC: 96 MG/DL — SIGNIFICANT CHANGE UP (ref 70–99)
GLUCOSE SERPL-MCNC: 100 MG/DL — HIGH (ref 70–99)
HADV DNA FLD NAA+PROBE-LOG#: ABNORMAL COPIES/ML
MAGNESIUM SERPL-MCNC: 2 MG/DL — SIGNIFICANT CHANGE UP (ref 1.6–2.6)
PHOSPHATE SERPL-MCNC: 2.2 MG/DL — LOW (ref 2.5–4.5)
POTASSIUM SERPL-MCNC: 3.4 MMOL/L — LOW (ref 3.5–5.3)
POTASSIUM SERPL-SCNC: 3.4 MMOL/L — LOW (ref 3.5–5.3)
PROT SERPL-MCNC: 5.9 G/DL — LOW (ref 6–8.3)
SODIUM SERPL-SCNC: 141 MMOL/L — SIGNIFICANT CHANGE UP (ref 135–145)
SPECIMEN SOURCE: SIGNIFICANT CHANGE UP
STRONGYLOIDES AB SER-ACNC: NEGATIVE — SIGNIFICANT CHANGE UP

## 2024-06-03 PROCEDURE — 93010 ELECTROCARDIOGRAM REPORT: CPT

## 2024-06-03 PROCEDURE — 99232 SBSQ HOSP IP/OBS MODERATE 35: CPT

## 2024-06-03 PROCEDURE — 99232 SBSQ HOSP IP/OBS MODERATE 35: CPT | Mod: GC

## 2024-06-03 RX ORDER — QUETIAPINE FUMARATE 200 MG/1
100 TABLET, FILM COATED ORAL EVERY 12 HOURS
Refills: 0 | Status: DISCONTINUED | OUTPATIENT
Start: 2024-06-03 | End: 2024-06-04

## 2024-06-03 RX ORDER — POTASSIUM PHOSPHATE, MONOBASIC POTASSIUM PHOSPHATE, DIBASIC 236; 224 MG/ML; MG/ML
15 INJECTION, SOLUTION INTRAVENOUS ONCE
Refills: 0 | Status: COMPLETED | OUTPATIENT
Start: 2024-06-03 | End: 2024-06-03

## 2024-06-03 RX ORDER — POTASSIUM PHOSPHATE, MONOBASIC POTASSIUM PHOSPHATE, DIBASIC 236; 224 MG/ML; MG/ML
30 INJECTION, SOLUTION INTRAVENOUS ONCE
Refills: 0 | Status: COMPLETED | OUTPATIENT
Start: 2024-06-03 | End: 2024-06-03

## 2024-06-03 RX ORDER — PHYTONADIONE (VIT K1) 5 MG
10 TABLET ORAL ONCE
Refills: 0 | Status: COMPLETED | OUTPATIENT
Start: 2024-06-03 | End: 2024-06-03

## 2024-06-03 RX ORDER — SODIUM CHLORIDE 9 MG/ML
500 INJECTION, SOLUTION INTRAVENOUS ONCE
Refills: 0 | Status: COMPLETED | OUTPATIENT
Start: 2024-06-03 | End: 2024-06-03

## 2024-06-03 RX ORDER — ACETYLCYSTEINE 200 MG/ML
12 VIAL (ML) MISCELLANEOUS EVERY 24 HOURS
Refills: 0 | Status: DISCONTINUED | OUTPATIENT
Start: 2024-06-03 | End: 2024-06-04

## 2024-06-03 RX ADMIN — HEPARIN SODIUM 5000 UNIT(S): 5000 INJECTION INTRAVENOUS; SUBCUTANEOUS at 17:24

## 2024-06-03 RX ADMIN — AMLODIPINE BESYLATE 5 MILLIGRAM(S): 2.5 TABLET ORAL at 05:53

## 2024-06-03 RX ADMIN — Medication 2 MILLIGRAM(S): at 07:34

## 2024-06-03 RX ADMIN — PIPERACILLIN AND TAZOBACTAM 25 GRAM(S): 4; .5 INJECTION, POWDER, LYOPHILIZED, FOR SOLUTION INTRAVENOUS at 13:02

## 2024-06-03 RX ADMIN — Medication 2 MILLIGRAM(S): at 03:45

## 2024-06-03 RX ADMIN — HEPARIN SODIUM 5000 UNIT(S): 5000 INJECTION INTRAVENOUS; SUBCUTANEOUS at 05:50

## 2024-06-03 RX ADMIN — POLYETHYLENE GLYCOL 3350 17 GRAM(S): 17 POWDER, FOR SOLUTION ORAL at 05:50

## 2024-06-03 RX ADMIN — Medication 44.17 GRAM(S): at 11:37

## 2024-06-03 RX ADMIN — Medication 2 MILLIGRAM(S): at 18:52

## 2024-06-03 RX ADMIN — Medication 2 MILLIGRAM(S): at 11:37

## 2024-06-03 RX ADMIN — PIPERACILLIN AND TAZOBACTAM 25 GRAM(S): 4; .5 INJECTION, POWDER, LYOPHILIZED, FOR SOLUTION INTRAVENOUS at 05:50

## 2024-06-03 RX ADMIN — DEXMEDETOMIDINE HYDROCHLORIDE IN 0.9% SODIUM CHLORIDE 3.98 MICROGRAM(S)/KG/HR: 4 INJECTION INTRAVENOUS at 11:37

## 2024-06-03 RX ADMIN — PIPERACILLIN AND TAZOBACTAM 25 GRAM(S): 4; .5 INJECTION, POWDER, LYOPHILIZED, FOR SOLUTION INTRAVENOUS at 21:12

## 2024-06-03 RX ADMIN — POTASSIUM PHOSPHATE, MONOBASIC POTASSIUM PHOSPHATE, DIBASIC 62.5 MILLIMOLE(S): 236; 224 INJECTION, SOLUTION INTRAVENOUS at 12:51

## 2024-06-03 RX ADMIN — LACTULOSE 20 GRAM(S): 10 SOLUTION ORAL at 13:02

## 2024-06-03 RX ADMIN — Medication 1 MILLIGRAM(S): at 11:37

## 2024-06-03 RX ADMIN — DEXMEDETOMIDINE HYDROCHLORIDE IN 0.9% SODIUM CHLORIDE 3.98 MICROGRAM(S)/KG/HR: 4 INJECTION INTRAVENOUS at 07:34

## 2024-06-03 RX ADMIN — QUETIAPINE FUMARATE 100 MILLIGRAM(S): 200 TABLET, FILM COATED ORAL at 17:24

## 2024-06-03 RX ADMIN — Medication 1 PATCH: at 11:38

## 2024-06-03 RX ADMIN — LACTULOSE 20 GRAM(S): 10 SOLUTION ORAL at 11:38

## 2024-06-03 RX ADMIN — Medication 1 TABLET(S): at 11:38

## 2024-06-03 RX ADMIN — Medication 2 MILLIGRAM(S): at 22:06

## 2024-06-03 RX ADMIN — PANTOPRAZOLE SODIUM 40 MILLIGRAM(S): 20 TABLET, DELAYED RELEASE ORAL at 05:49

## 2024-06-03 RX ADMIN — Medication 2 MILLIGRAM(S): at 23:50

## 2024-06-03 RX ADMIN — Medication 2 MILLIGRAM(S): at 15:45

## 2024-06-03 RX ADMIN — LACTULOSE 20 GRAM(S): 10 SOLUTION ORAL at 05:49

## 2024-06-03 RX ADMIN — Medication 1 PATCH: at 04:45

## 2024-06-03 RX ADMIN — Medication 10 MILLIGRAM(S): at 21:16

## 2024-06-03 RX ADMIN — CHLORHEXIDINE GLUCONATE 1 APPLICATION(S): 213 SOLUTION TOPICAL at 05:49

## 2024-06-03 RX ADMIN — Medication 100 MILLIGRAM(S): at 11:38

## 2024-06-03 RX ADMIN — SODIUM CHLORIDE 2000 MILLILITER(S): 9 INJECTION, SOLUTION INTRAVENOUS at 15:52

## 2024-06-03 RX ADMIN — QUETIAPINE FUMARATE 50 MILLIGRAM(S): 200 TABLET, FILM COATED ORAL at 05:49

## 2024-06-03 RX ADMIN — POTASSIUM PHOSPHATE, MONOBASIC POTASSIUM PHOSPHATE, DIBASIC 83.33 MILLIMOLE(S): 236; 224 INJECTION, SOLUTION INTRAVENOUS at 17:23

## 2024-06-03 RX ADMIN — Medication 1 PATCH: at 19:09

## 2024-06-03 NOTE — PROGRESS NOTE ADULT - ASSESSMENT
Interval events  -     Plan:  NEURO  - Nicotine patch  - Sedation: Precedex  - Seroquel 50bid for agitation  - Lorazepam 2q4 PRN for CIWA  - Trend ammonia  - Lactulose for hepatic encephalopathy     RESP  - Saturating well on room air  - Maintain O2 saturation >92%  - Daily CXR    CV  - MAP goal >65  - Pressors: None  - Amlodipine 5qd   - 5/29 TTE ordered    GI/NUTRITION  - Diet: Reg  - Protonix IV daily  - Lactulose    RENAL/  - Monitor Corbett output  - albumin 50 q8    HEME  - DVT ppx: SQH    ID  - ABX: s/p CTX, now on Zosyn and rifaximin  - 5/29: BCx - results pending  - 5/29: +UA, UCx negative    ENDO  - MISS  - Monitor blood glucose     LINES  - PIVs    CODE: FULL  DISPO: SICU    38M hx HTN, ETOH use (daily drink) PSH gastric sleeve, initially admitted to [facility] for jaundice and confusion, BIBEMS as pt was wondering the streets. Noted to have significantly elevated LFTs (4000s), MELD 47, high CIWA, transfer for liver evaluation iso acute alcoholic hepatitis.    Plan:  NEURO  - Nicotine patch  - Sedation: Precedex  - Seroquel 50bid for agitation  - Lorazepam 2q4 PRN for CIWA  - Trend ammonia  - Lactulose for hepatic encephalopathy     RESP  - Saturating well on room air  - Maintain O2 saturation >92%  - Daily CXR    CV  - MAP goal >65  - Pressors: None  - Amlodipine 5qd   - 5/29 TTE ordered    GI/NUTRITION  - Diet: Reg  - Protonix IV daily  - Lactulose    RENAL/  - Monitor Corbett output  - albumin 50 q8    HEME  - DVT ppx: SQH    ID  - ABX: s/p CTX, now on Zosyn and rifaximin  - 5/29: BCx - results pending  - 5/29: +UA, UCx negative    ENDO  - MISS  - Monitor blood glucose     LINES  - PIVs    CODE: FULL  DISPO: SICU

## 2024-06-03 NOTE — CONSULT NOTE ADULT - REASON FOR ADMISSION
liver transplant workup

## 2024-06-03 NOTE — BH CONSULTATION LIAISON PROGRESS NOTE - NSBHATTESTCOMMENTATTENDFT_PSY_A_CORE
Patient seen with ACP, chart reviewed, coordination of care provided with primary team; agree with above assessment and plan. Please maintain patient on high dose CIWA , PO/IM/IV standing and PRN Taper and monitor VS (intermittent episodes of tachycardia) indicative of possible ongoing withdrawal. Ideally would use benzodiazepine in setting of agitation from withdrawal but may need to consider switch to phenobarbital if patient's withdrawal symptom and severity remain refractory to high dose taper. Can consider switch to PRN IV/IM Haldol in lieu of Seroquel use as patient may not be able to tolerate PO medication with current mental state.   Patient seen with ACP, chart reviewed, coordination of care provided with primary team; agree with above assessment and plan. Please maintain patient on high dose CIWA protcol, PO/IM/IV standing and PRN Taper and monitor VS to better monitor for ongoing withdrawal. Ideally would use benzodiazepine in setting of agitation from withdrawal but may need to consider switch to phenobarbital if patient's withdrawal symptom and severity remain refractory to high dose taper. Can consider switch to PRN IV/IM Haldol in lieu of Seroquel use as patient may not be able to tolerate PO medication with current mental state.

## 2024-06-03 NOTE — PROGRESS NOTE ADULT - ASSESSMENT
38M PMH HTN, ETOH use (daily drink) PSH gastric sleeve, initially admitted to [facility] for jaundice and confusion, found to have elevated transaminases to 4000s with high MELD and withdrawal symptoms, transferred to [facility] for liver transplant workup.     [] Acute alc hep  [] ETOH Cirrhosis   - LFTs trending down  - Cont NAC gtt until INR < 1.5 and ALT < 1000  - Delirium: ?Alcohol withdrawal; ativan per [facility] protocol  - HE: cont lactulose, add rifaximin  - Rising WBC: Follow up BCx x2 send 6/2, UA/CXR negative   - Continue Empiric Zosyn (6/2--)   - BID lab work   - Regular diet   - Open liver transplant eval

## 2024-06-03 NOTE — PROGRESS NOTE ADULT - SUBJECTIVE AND OBJECTIVE BOX
Transplant Surgery - Multidisciplinary Rounds  --------------------------------------------------------------  Present:   Patient seen and examined with multidisciplinary Transplant team including Surgeon: Dr Dagher, Hepatologist: Dr Beth,  CROW Medina/Wendy, and beside RN during AM rounds. Disciplines not in attendance will be notified of the plan.     HPI: 38M PMH HTN, ETOH use (daily drink) PSH gastric sleeve, initially admitted to [facility] for jaundice and confusion, found to have elevated transaminases to 4000s with high MELD and withdrawal symptoms, transferred to [facility] for liver transplant workup.       Interval Events:   - Remains on precedex, 1:1 observation  - Repeat infectious work u pfor leukocytosis  - Started Zosyn   - UA/CXR negative for infectious process                                                                                                                                                                                                                 Potential Discharge date: TBD  Education:  Medications  Plan of care:  See Below      MEDICATIONS  (STANDING):  acetylcysteine IVPB 12 Gram(s) IV Intermittent every 24 hours  amLODIPine   Tablet 5 milliGRAM(s) Oral daily  chlorhexidine 2% Cloths 1 Application(s) Topical <User Schedule>  dexMEDEtomidine Infusion 0.2 MICROgram(s)/kG/Hr (3.98 mL/Hr) IV Continuous <Continuous>  folic acid 1 milliGRAM(s) Oral daily  heparin   Injectable 5000 Unit(s) SubCutaneous every 12 hours  insulin lispro (ADMELOG) corrective regimen sliding scale   SubCutaneous three times a day before meals  insulin lispro (ADMELOG) corrective regimen sliding scale   SubCutaneous at bedtime  lactulose Syrup 20 Gram(s) Oral every 4 hours  LORazepam     Tablet 2 milliGRAM(s) Oral every 4 hours  multivitamin 1 Tablet(s) Oral daily  nicotine - 21 mG/24Hr(s) Patch 1 Patch Transdermal daily  pantoprazole    Tablet 40 milliGRAM(s) Oral before breakfast  piperacillin/tazobactam IVPB.. 3.375 Gram(s) IV Intermittent every 8 hours  polyethylene glycol 3350 17 Gram(s) Oral every 12 hours  QUEtiapine 100 milliGRAM(s) Oral every 12 hours  rifAXIMin 550 milliGRAM(s) Oral two times a day  thiamine Injectable 100 milliGRAM(s) IV Push daily    MEDICATIONS  (PRN):  LORazepam   Injectable 2 milliGRAM(s) IV Push every 2 hours PRN Agitation  LORazepam   Injectable 2 milliGRAM(s) IV Push every 1 hour PRN CIWA-Ar score 8 or greater      PAST MEDICAL & SURGICAL HISTORY:  Hypertension, unspecified type      H/O gastric sleeve          Vital Signs Last 24 Hrs  T(C): 36.7 (03 Jun 2024 07:00), Max: 36.9 (03 Jun 2024 03:00)  T(F): 98.1 (03 Jun 2024 07:00), Max: 98.4 (03 Jun 2024 03:00)  HR: 72 (03 Jun 2024 10:00) (71 - 80)  BP: 113/69 (03 Jun 2024 10:00) (108/60 - 155/100)  BP(mean): 86 (03 Jun 2024 10:00) (77 - 122)  RR: 18 (03 Jun 2024 10:00) (15 - 28)  SpO2: 100% (03 Jun 2024 10:00) (93% - 100%)    Parameters below as of 03 Jun 2024 07:00  Patient On (Oxygen Delivery Method): room air        I&O's Summary    02 Jun 2024 07:01  -  03 Jun 2024 07:00  --------------------------------------------------------  IN: 2751.9 mL / OUT: 5495 mL / NET: -2743.1 mL    03 Jun 2024 07:01  -  03 Jun 2024 11:06  --------------------------------------------------------  IN: 258.8 mL / OUT: 475 mL / NET: -216.2 mL                              13.6   17.93 )-----------( 177      ( 02 Jun 2024 22:25 )             39.4     06-02    137  |  105  |  9   ----------------------------<  175<H>  3.7   |  18<L>  |  0.53    Ca    8.5      02 Jun 2024 22:25  Phos  4.1     06-02  Mg     1.9     06-02    TPro  6.4  /  Alb  3.4  /  TBili  16.6<H>  /  DBili  x   /  AST  84<H>  /  ALT  587<H>  /  AlkPhos  266<H>  06-02          Culture - Urine (collected 05-29-24 @ 17:16)  Source: Clean Catch Clean Catch (Midstream)  Final Report (05-30-24 @ 14:02):    <10,000 CFU/mL Normal Urogenital Aviva    Culture - Blood (collected 05-29-24 @ 13:40)  Source: .Blood Blood-Peripheral  Preliminary Report (06-02-24 @ 18:01):    No growth at 4 days    Culture - Blood (collected 05-29-24 @ 13:30)  Source: .Blood Blood-Peripheral  Preliminary Report (06-02-24 @ 18:01):    No growth at 4 days    Culture - Blood (collected 05-29-24 @ 12:00)  Source: .Blood Blood  Preliminary Report (06-02-24 @ 17:01):    No growth at 4 days    Culture - Blood (collected 05-29-24 @ 11:50)  Source: .Blood Blood  Preliminary Report (06-02-24 @ 17:01):    No growth at 4 days        Review of systems  unable to obtain, AMS      PHYSICAL EXAM:  GENERAL: Patient appears confused  HEENT:  NCAT, + scleral icterus  CHEST: no resp distress  HEART:  RRR  ABDOMEN:  Soft, non-tender, non-distended, no masses  EXTREMITIES:  No cyanosis, clubbing, or edema  SKIN:  No rash/erythema/ecchymoses/petechiae/wounds/abscess/warm/dry  NEURO:  Alert and oriented x 1

## 2024-06-03 NOTE — CONSULT NOTE ADULT - SUBJECTIVE AND OBJECTIVE BOX
Patient is a 38y old  male who presents to hospital for liver transplant evaluation. Dental consulted for dental evaluation prior to liver transplant    HPI:  38M PMH HTN, ETOH use (daily drink) PSH gastric sleeve, initially admitted to [facility] for jaundice and confusion, found to have elevated transaminases to 4000s with high MELD and withdrawal symptoms, transferred to [facility] for liver transplant workup.  Majority of history obtained from chart as patient is encephalopathic and unable to provide reliable history.  Per chart, patient was brought to hospital by father after found at the roof of the house hallucinating. Patient had recently also been started on tetracycline for sore throat and reports that he became jaundiced afterwards. Patient reports no alcohol use disorder and that last drink was 6 days prior to admission, but per family, patient is heavy alcohol user. This is first hospitalization for alcohol decompensation.  (29 May 2024 16:44)    PAST MEDICAL & SURGICAL HISTORY:  Hypertension, unspecified type    H/O gastric sleeve    MEDICATIONS  (STANDING):  acetylcysteine IVPB 12 Gram(s) IV Intermittent every 24 hours  amLODIPine   Tablet 5 milliGRAM(s) Oral daily  chlorhexidine 2% Cloths 1 Application(s) Topical <User Schedule>  dexMEDEtomidine Infusion 0.2 MICROgram(s)/kG/Hr (3.98 mL/Hr) IV Continuous <Continuous>  folic acid 1 milliGRAM(s) Oral daily  heparin   Injectable 5000 Unit(s) SubCutaneous every 12 hours  insulin lispro (ADMELOG) corrective regimen sliding scale   SubCutaneous three times a day before meals  insulin lispro (ADMELOG) corrective regimen sliding scale   SubCutaneous at bedtime  lactulose Syrup 20 Gram(s) Oral every 4 hours  LORazepam     Tablet 2 milliGRAM(s) Oral every 4 hours  multivitamin 1 Tablet(s) Oral daily  nicotine - 21 mG/24Hr(s) Patch 1 Patch Transdermal daily  pantoprazole    Tablet 40 milliGRAM(s) Oral before breakfast  piperacillin/tazobactam IVPB.. 3.375 Gram(s) IV Intermittent every 8 hours  polyethylene glycol 3350 17 Gram(s) Oral every 12 hours  QUEtiapine 100 milliGRAM(s) Oral every 12 hours  rifAXIMin 550 milliGRAM(s) Oral two times a day  thiamine Injectable 100 milliGRAM(s) IV Push daily    MEDICATIONS  (PRN):  LORazepam   Injectable 2 milliGRAM(s) IV Push every 2 hours PRN Agitation  LORazepam   Injectable 2 milliGRAM(s) IV Push every 1 hour PRN CIWA-Ar score 8 or greater    Allergies  No Known Allergies  Intolerances    EOE:  TMJ (  - ) clicks                    (  -  ) pops                    (  -  ) crepitus             Mandible FROM             Facial bones and MOM grossly intact             (  - ) trismus             ( -  ) LAD             ( -  ) swelling             ( -  ) asymmetry             ( -  ) palpation             ( -  ) SOB             ( -  ) dysphagia             ( -  ) LOC    IOE:  permanent dentition: grossly intact            hard/soft palate:  No pathology noted           tongue/FOM bilateral mandibular roxanna            labial/buccal mucosa No pathology noted           ( -  ) percussion           ( -  ) palpation           ( -  ) swelling    Dentition present: permanent dentition: grossly intact   Mobility: none noted    Radiographs: none taken as patient is non-transportable as per med team.    ASSESSMENT: Limited bedside exam performed with patient's verbal consent. No clinical signs of acute odontogenic infection. No evidence of mobility, swelling, fistula or abscess. However, odontogenic infection cannot be fully ruled out without dental radiographs. Patient is dentally optimized for liver transplant.    PROCEDURE:  Verbal consent given for limited clinical bedside exam. All questions and concerns of the patient were addressed. Recommended to patient to go to outside dentist or [facility] dental clinic.    RECOMMENDATIONS:   1) [facility] if patient becomes transportable for dental x rays   2) Dental F/U with [facility] dental [phone] or outpatient dentist for comprehensive dental care.     Anushka Kolb, DRAKE [id number]

## 2024-06-03 NOTE — CONSULT NOTE ADULT - CONSULT REASON
Pre-Liver Transplant Evaluation
Dental consulted for dental evaluation prior to liver transplant
pretransplant cardiac evaluation prior to possible liver transplant
Acute liver failure
liver transplant evaluation
liver transplant workup

## 2024-06-03 NOTE — PROGRESS NOTE ADULT - SUBJECTIVE AND OBJECTIVE BOX
Interval events:  -No acute issues overnight    NEURO  RASS (if intubated): 		CAM ICU (if concern for delirium):  Exam: AO X4  Meds: dexMEDEtomidine Infusion 0.2 MICROgram(s)/kG/Hr IV Continuous <Continuous>  LORazepam   Injectable 2 milliGRAM(s) IV Push every 1 hour PRN CIWA-Ar score 8 or greater  LORazepam   Injectable 2 milliGRAM(s) IV Push every 4 hours  QUEtiapine 50 milliGRAM(s) Oral every 12 hours      RESPIRATORY  RR: 19 (06-03-24 @ 00:00) (15 - 25)  SpO2: 96% (06-03-24 @ 00:00) (93% - 99%)  Wt(kg): --  Exam: nonlabored respirations on room air  Mechanical Ventilation:     Meds:     CARDIOVASCULAR  HR: 74 (06-03-24 @ 00:00) (69 - 84)  BP: 141/85 (06-03-24 @ 00:00) (122/79 - 154/74)  BP(mean): 108 (06-03-24 @ 00:00) (91 - 115)  ABP: --  ABP(mean): --  Wt(kg): --  CVP(cm H2O): --  VBG - ( 02 Jun 2024 22:21 )  pH: 7.40  /  pCO2: 36    /  pO2: 62    / HCO3: 22    / Base Excess: -2.0  /  SaO2: 93.0   Lactate: 1.1                Exam: normal rate  Meds: amLODIPine   Tablet 5 milliGRAM(s) Oral daily      GI/NUTRITION  Exam: soft, nontender, nondistended  Diet: regular  Meds: lactulose Syrup 20 Gram(s) Oral every 4 hours  pantoprazole    Tablet 40 milliGRAM(s) Oral before breakfast  polyethylene glycol 3350 17 Gram(s) Oral every 12 hours      GENITOURINARY  I&O's Detail    06-01 @ 07:01  -  06-02 @ 07:00  --------------------------------------------------------  IN:    Dexmedetomidine: 799.2 mL    IV PiggyBack: 1058.4 mL    IV PiggyBack: 100 mL    Oral Fluid: 1020 mL  Total IN: 2977.6 mL    OUT:    Voided (mL): 4280 mL  Total OUT: 4280 mL    Total NET: -1302.4 mL      06-02 @ 07:01  -  06-03 @ 00:53  --------------------------------------------------------  IN:    Dexmedetomidine: 503.2 mL    IV PiggyBack: 100 mL    IV PiggyBack: 749.7 mL    IV PiggyBack: 499.9 mL  Total IN: 1852.8 mL    OUT:    Indwelling Catheter - Urethral (mL): 3620 mL  Total OUT: 3620 mL    Total NET: -1767.2 mL          06-02    137  |  105  |  9   ----------------------------<  175<H>  3.7   |  18<L>  |  0.53    Ca    8.5      02 Jun 2024 22:25  Phos  4.1     06-02  Mg     1.9     06-02    TPro  6.4  /  Alb  3.4  /  TBili  16.6<H>  /  DBili  x   /  AST  84<H>  /  ALT  587<H>  /  AlkPhos  266<H>  06-02    Meds: folic acid 1 milliGRAM(s) Oral daily  multivitamin 1 Tablet(s) Oral daily  thiamine Injectable 100 milliGRAM(s) IV Push daily      HEMATOLOGIC  Meds: heparin   Injectable 5000 Unit(s) SubCutaneous every 12 hours                          13.6   17.93 )-----------( 177      ( 02 Jun 2024 22:25 )             39.4     PT/INR - ( 02 Jun 2024 22:25 )   PT: 19.3 sec;   INR: 1.87 ratio         PTT - ( 02 Jun 2024 22:25 )  PTT:30.9 sec    INFECTIOUS DISEASES  T(C): 36.8 (06-02-24 @ 23:00), Max: 36.8 (06-02-24 @ 23:00)  Wt(kg): --  WBC Count: 17.93 K/uL (06-02 @ 22:25)  WBC Count: 16.69 K/uL (06-02 @ 05:17)    Recent Cultures:  Specimen Source: Clean Catch Clean Catch (Midstream), 05-29 @ 17:16; Results   <10,000 CFU/mL Normal Urogenital Aviva; Gram Stain: --; Organism: --  Specimen Source: .Blood Blood-Peripheral, 05-29 @ 13:40; Results   No growth at 4 days; Gram Stain: --; Organism: --  Specimen Source: .Blood Blood-Peripheral, 05-29 @ 13:30; Results   No growth at 4 days; Gram Stain: --; Organism: --  Specimen Source: .Blood Blood, 05-29 @ 12:00; Results   No growth at 4 days; Gram Stain: --; Organism: --  Specimen Source: .Blood Blood, 05-29 @ 11:50; Results   No growth at 4 days; Gram Stain: --; Organism: --    Meds: piperacillin/tazobactam IVPB.. 3.375 Gram(s) IV Intermittent every 8 hours  rifAXIMin 550 milliGRAM(s) Oral two times a day      ENDOCRINE  Capillary Blood Glucose    Meds: insulin lispro (ADMELOG) corrective regimen sliding scale   SubCutaneous three times a day before meals  insulin lispro (ADMELOG) corrective regimen sliding scale   SubCutaneous at bedtime      OTHER MEDICATIONS:  acetylcysteine IVPB 12 Gram(s) IV Intermittent every 24 hours  chlorhexidine 2% Cloths 1 Application(s) Topical <User Schedule>  nicotine - 21 mG/24Hr(s) Patch 1 Patch Transdermal daily      IMAGING:

## 2024-06-03 NOTE — PROGRESS NOTE ADULT - SUBJECTIVE AND OBJECTIVE BOX
INFECTIOUS DISEASES FOLLOW UP-- Dee Camacho  [phone]    This is a follow up note for this  38yMale with  Acute or subacute hepatic failure without coma    undergoing pre-liver transplant evaluation    ROS:  CONSTITUTIONAL:  No fever, good appetite  CARDIOVASCULAR:  No chest pain or palpitations  RESPIRATORY:  No dyspnea  GASTROINTESTINAL:  No nausea, vomiting, diarrhea, or abdominal pain  GENITOURINARY:  No dysuria  NEUROLOGIC:  No headache,     Allergies    No Known Allergies    Intolerances        ANTIBIOTICS/RELEVANT:  antimicrobials  piperacillin/tazobactam IVPB.. 3.375 Gram(s) IV Intermittent every 8 hours  rifAXIMin 550 milliGRAM(s) Oral two times a day    immunologic:    OTHER:  acetylcysteine IVPB 12 Gram(s) IV Intermittent every 24 hours  amLODIPine   Tablet 5 milliGRAM(s) Oral daily  chlorhexidine 2% Cloths 1 Application(s) Topical <User Schedule>  dexMEDEtomidine Infusion 0.2 MICROgram(s)/kG/Hr IV Continuous <Continuous>  folic acid 1 milliGRAM(s) Oral daily  heparin   Injectable 5000 Unit(s) SubCutaneous every 12 hours  insulin lispro (ADMELOG) corrective regimen sliding scale   SubCutaneous three times a day before meals  insulin lispro (ADMELOG) corrective regimen sliding scale   SubCutaneous at bedtime  lactulose Syrup 20 Gram(s) Oral every 4 hours  LORazepam     Tablet 2 milliGRAM(s) Oral every 4 hours  LORazepam   Injectable 2 milliGRAM(s) IV Push every 2 hours PRN  LORazepam   Injectable 2 milliGRAM(s) IV Push every 1 hour PRN  multivitamin 1 Tablet(s) Oral daily  nicotine - 21 mG/24Hr(s) Patch 1 Patch Transdermal daily  pantoprazole    Tablet 40 milliGRAM(s) Oral before breakfast  polyethylene glycol 3350 17 Gram(s) Oral every 12 hours  potassium phosphate IVPB 30 milliMole(s) IV Intermittent once  QUEtiapine 100 milliGRAM(s) Oral every 12 hours  thiamine Injectable 100 milliGRAM(s) IV Push daily      Objective:  Vital Signs Last 24 Hrs  T(C): 36.8 (03 Jun 2024 14:00), Max: 36.9 (03 Jun 2024 03:00)  T(F): 98.2 (03 Jun 2024 14:00), Max: 98.4 (03 Jun 2024 03:00)  HR: 78 (03 Jun 2024 14:00) (72 - 80)  BP: 82/47 (03 Jun 2024 14:00) (82/47 - 155/100)  BP(mean): 61 (03 Jun 2024 14:00) (61 - 122)  RR: 19 (03 Jun 2024 14:00) (15 - 28)  SpO2: 98% (03 Jun 2024 14:00) (93% - 100%)    Parameters below as of 03 Jun 2024 07:00  Patient On (Oxygen Delivery Method): room air        PHYSICAL EXAM:  Constitutional:no acute distress  Eyes:TEO, EOMI  Ear/Nose/Throat: no oral lesions, 	  Respiratory: clear BL  Cardiovascular: S1S2  Gastrointestinal:soft, (+) BS, no tenderness  Extremities:no e/e/c  No Lymphadenopathy  IV sites not inflammed.    LABS:                        13.6   17.93 )-----------( 177      ( 02 Jun 2024 22:25 )             39.4     06-03    141  |  111<H>  |  9   ----------------------------<  100<H>  3.4<L>   |  17<L>  |  0.57    Ca    8.3<L>      03 Jun 2024 11:12  Phos  2.2     06-03  Mg     2.0     06-03    TPro  5.9<L>  /  Alb  2.9<L>  /  TBili  16.7<H>  /  DBili  x   /  AST  77<H>  /  ALT  438<H>  /  AlkPhos  238<H>  06-03    PT/INR - ( 02 Jun 2024 22:25 )   PT: 19.3 sec;   INR: 1.87 ratio         PTT - ( 02 Jun 2024 22:25 )  PTT:30.9 sec  Urinalysis Basic - ( 03 Jun 2024 11:12 )    Color: x / Appearance: x / SG: x / pH: x  Gluc: 100 mg/dL / Ketone: x  / Bili: x / Urobili: x   Blood: x / Protein: x / Nitrite: x   Leuk Esterase: x / RBC: x / WBC x   Sq Epi: x / Non Sq Epi: x / Bacteria: x        MICROBIOLOGY:            RECENT CULTURES:  06-02 @ 09:30  .Blood Blood  --  --  --    No growth at 24 hours  --  06-02 @ 09:15  .Blood Blood  --  --  --    No growth at 24 hours  --  05-29 @ 17:16  Clean Catch Clean Catch (Midstream)  --  --  --    <10,000 CFU/mL Normal Urogenital Aviva  --  05-29 @ 13:40  .Blood Blood-Peripheral  --  --  --    No growth at 4 days  --  05-29 @ 13:30  .Blood Blood-Peripheral  --  --  --    No growth at 4 days  --  05-29 @ 12:00  .Blood Blood  --  --  --    No growth at 4 days  --  05-29 @ 11:50  .Blood Blood  --  --  --    No growth at 4 days  --      RADIOLOGY & ADDITIONAL STUDIES:    < from: Xray Chest 1 View- PORTABLE-Urgent (Xray Chest 1 View- PORTABLE-Urgent .) (06.02.24 @ 10:21) >    FINDINGS:    Normal cardiac silhouette, unchanged  The lungs are clear. Low lung volumes. Normal pulmonary vasculature  There is no pneumothorax or pleural effusion.  No acute bony abnormality.    IMPRESSION:  Expiratory view Clear lungs. Findings unchanged    --- End of Report ---    < end of copied text >

## 2024-06-03 NOTE — PROGRESS NOTE ADULT - ASSESSMENT
38M PMH HTN, ETOH use (daily drink) PSH gastric sleeve, initially admitted to [facility] for jaundice and confusion, found to have elevated transaminases to 4000s with high MELD and withdrawal symptoms, transferred to [facility] for liver transplant workup.     MRSA/MSSA nasal PCR (May 29) negative  Urine culture (May 29) no growth  Blood cultures (May 29) no growth to date    CT neck (May 29) Enlarged palatine tonsils without hyperemia or surrounding infiltrative changes.  CT chest (May 29) no focal consolidation  CT abdomen pelvis (May 29) Diffuse, marked hepatic steatosis, Question of mild gallbladder wall thickening    #Acute liver injury, transaminitis  Hepatitis A IgM (May 29) negative  Hepatitis B DNA by PCR (May 29) negative  Hepatitis C RNA (May 29) negative  CMV by PCR (May 29) negative  HSV 1/2 serum PCR Negative  -- Recommend adenovirus serum PCR  -- Follow up on varicella DNA PCR the HSV PCR 1/2 is negative  -- Negative HIV viral load      #Preliver transplant evaluation  COVID19 Ortiz Antibody positive  COVID19 Nucleocapsid Antibody positive  HAV IgG positive  HBVs Ab Negative, HBVs Ag Negative, HBVc Ab negative, HBV PCR Negative  HCV Ab negative, HCV PCR Negative  HSV 1 IgG positive  HSV 2 IgG Equivocal  EBV IgG positive  CMV IgG positive  VZV IgG positive  Measles IgG negative, Mumps IgG negative, Rubella Equivocal  HIV Ag/Ab by CMIA negative  Toxoplasma IgG positive  --No absolute ID contraindication for OLT  -- Follow-up on Coccidioides antibody  -- Follow-up on Trypanosoma cruzi Ab  -- Follow-up on Strongyloides Ab   -- Follow-up on Quantiferon Gold  -- Follow-up on Strongyloides Ab    #Encounter to Vaccinate Patient - Will defer vaccinations until further clinical stability given acuity of presentation  COVID19: Would benefit from COVID19 3256-1059 Vaccine Dose  Influenza: Will require with next season  Pneumococcal: Would benefit from PCV20  HAV: Immune, would not require further vaccination  HBV: Would benefit from Heplisav vaccination  MMR: Not mumps or rubeola immune, ideally would require dose of MMR but this would be a live vaccine  Varicella: Immune, would not require further vaccination  Shingles: Will require Shingrix  Tdap: Will require Tdap      Alvarez Camacho MD  Can be called via Teams  After 5pm/weekends [phone]

## 2024-06-03 NOTE — PROGRESS NOTE ADULT - SUBJECTIVE AND OBJECTIVE BOX
Gastroenterology/Hepatology Progress Note    Interval Events:   - no acute ON events   - patient continues on precedex for agitation     Allergies:  No Known Allergies      Hospital Medications:  acetylcysteine IVPB 12 Gram(s) IV Intermittent every 24 hours  amLODIPine   Tablet 5 milliGRAM(s) Oral daily  chlorhexidine 2% Cloths 1 Application(s) Topical <User Schedule>  dexMEDEtomidine Infusion 0.2 MICROgram(s)/kG/Hr IV Continuous <Continuous>  folic acid 1 milliGRAM(s) Oral daily  heparin   Injectable 5000 Unit(s) SubCutaneous every 12 hours  insulin lispro (ADMELOG) corrective regimen sliding scale   SubCutaneous three times a day before meals  insulin lispro (ADMELOG) corrective regimen sliding scale   SubCutaneous at bedtime  lactulose Syrup 20 Gram(s) Oral every 4 hours  LORazepam     Tablet 2 milliGRAM(s) Oral every 4 hours  LORazepam   Injectable 2 milliGRAM(s) IV Push every 2 hours PRN  LORazepam   Injectable 2 milliGRAM(s) IV Push every 1 hour PRN  multivitamin 1 Tablet(s) Oral daily  nicotine - 21 mG/24Hr(s) Patch 1 Patch Transdermal daily  pantoprazole    Tablet 40 milliGRAM(s) Oral before breakfast  piperacillin/tazobactam IVPB.. 3.375 Gram(s) IV Intermittent every 8 hours  polyethylene glycol 3350 17 Gram(s) Oral every 12 hours  potassium phosphate IVPB 30 milliMole(s) IV Intermittent once  QUEtiapine 100 milliGRAM(s) Oral every 12 hours  rifAXIMin 550 milliGRAM(s) Oral two times a day  thiamine Injectable 100 milliGRAM(s) IV Push daily      ROS: 14 point ROS unable to be assessed given mental status     PHYSICAL EXAM:   Vital Signs:  Vital Signs Last 24 Hrs  T(C): 36.7 (03 Jun 2024 11:00), Max: 36.9 (03 Jun 2024 03:00)  T(F): 98 (03 Jun 2024 11:00), Max: 98.4 (03 Jun 2024 03:00)  HR: 72 (03 Jun 2024 12:00) (71 - 80)  BP: 110/63 (03 Jun 2024 12:00) (108/60 - 155/100)  BP(mean): 80 (03 Jun 2024 12:00) (77 - 122)  RR: 16 (03 Jun 2024 12:00) (15 - 28)  SpO2: 100% (03 Jun 2024 12:00) (93% - 100%)    Parameters below as of 03 Jun 2024 07:00  Patient On (Oxygen Delivery Method): room air      Daily     Daily     GENERAL:  ill appearing, lethargic  HEENT:  NCAT, + scleral icterus  CHEST: no resp distress  HEART:  RRR  ABDOMEN:  Soft, non-tender, non-distended,   EXTREMITIES:  No LE edema b/l  SKIN:  Jaundiced  NEURO:  Somnolent, able to wake up intermittently, but not answering questions appropriately and does not follow commands, unable to assess for asterixis, PERRL    LABS:                        13.6   17.93 )-----------( 177      ( 02 Jun 2024 22:25 )             39.4     Mean Cell Volume: 91.4 fl (06-02-24 @ 22:25)    06-03    141  |  111<H>  |  9   ----------------------------<  100<H>  3.4<L>   |  17<L>  |  0.57    Ca    8.3<L>      03 Jun 2024 11:12  Phos  2.2     06-03  Mg     2.0     06-03    TPro  5.9<L>  /  Alb  2.9<L>  /  TBili  16.7<H>  /  DBili  x   /  AST  77<H>  /  ALT  438<H>  /  AlkPhos  238<H>  06-03    LIVER FUNCTIONS - ( 03 Jun 2024 11:12 )  Alb: 2.9 g/dL / Pro: 5.9 g/dL / ALK PHOS: 238 U/L / ALT: 438 U/L / AST: 77 U/L / GGT: x           PT/INR - ( 02 Jun 2024 22:25 )   PT: 19.3 sec;   INR: 1.87 ratio         PTT - ( 02 Jun 2024 22:25 )  PTT:30.9 sec  Urinalysis Basic - ( 03 Jun 2024 11:12 )    Color: x / Appearance: x / SG: x / pH: x  Gluc: 100 mg/dL / Ketone: x  / Bili: x / Urobili: x   Blood: x / Protein: x / Nitrite: x   Leuk Esterase: x / RBC: x / WBC x   Sq Epi: x / Non Sq Epi: x / Bacteria: x      Amylase Serum--      Lipase serum--       [id number]        Imaging:

## 2024-06-03 NOTE — PROGRESS NOTE ADULT - ASSESSMENT
38M PMH HTN, ETOH use (daily drink) PSH gastric sleeve, initially admitted to [facility] for jaundice and confusion, found to have elevated transaminases to 4000s with high MELD and withdrawal symptoms, transferred to [facility] for liver transplant workup.     #Acute liver failure in the setting of decompensated alcoholic cirrhosis  - Calculated MELD 3 score on 6/3 - 25. Presented w/ elevated liver enzymes, predominantly hepatocellular pattern, now AST/ALT trending down, w/ bili rising. INR has been slowly trending down. Ammonia level stable at 62.   - hepatitis B surface antibody, hepatitis B surface antigen, hepatitis core antibody, hepatitis C NR   - EV: No prior EGD.   - CT chest/ A/P with diffuse, marked hepatic steatosis   - CT head with no acute intracranial hemorrhage, mass effect or midline shift.  - MERLY negative, anti-mitochondrial antibody <1:20, anti-smooth muscle antibody <1:20, immunoglobulins (IgG 1022, IgM 122, IgA quantitative 502) for autoimmune etiologies. Utox neg.   - infectious work up neg - CTX d/c on 6/1.     #Acute encephalopathy in the setting of custodial   - Currently he is agitated, has  been on precedex, and getting intermittent doses of Ativan which keeps him calm.   - Still confused, more agitated, concerned for DT vs. HE, refusing lactulose and no BM in 2 days.     Recommendations:   - continue with NAC drip (can dc once INR <1.5)   - c/w Zosyn for empiric treatment.   - benzodiazepines for alcohol withdrawal   - Continue with lactulose and rifaximin for HE, goal 3-5 BMs per day.   - Please obtain daily lactate and ammonia levels.   - Daily MELD labs.     Discussed the case with Dr. Beth.    All recommendations are tentative until note is attested by attending.     Marilyn Naylor, PGY-5  Gastroenterology/Hepatology Fellow  Available on [employer] Teams  [phone] ([facility] Short Range Pager)  [phone] ([facility] Long Range Pager)    On Weekends/Holidays (All day) and Weekdays after 5 PM to 8AM:  For non-urgent consults, please email [email or web address]  For emergent consults, please contact on call GI team.  See Amion schedule ([facility]), [place] system ([facility]), or call hospital  ([facility])

## 2024-06-03 NOTE — BH CONSULTATION LIAISON PROGRESS NOTE - NSBHASSESSMENTFT_PSY_ALL_CORE
38-year-old, , , male, with PPHx of AUD, with no past psychiatric admissions, with PMH HTN, PSH gastric sleeve, initially admitted to [facility] for jaundice and confusion, found to have elevated transaminases to 4000s with high MELD and withdrawal symptoms, transferred to [facility] for liver transplant workup. Patient seen by transplant psychiatry as part of liver transplant evaluation.    5/31:Patient seen at bedside with nursing staff in the room, on exam patient was A/Ox1/2, hypersomnolent, with slurred speech, arousable only to physical stimuli. Patient noted that he still responding to internal stimuli on exam, noting that he experiencing AH and tactile while on Precedex at this time, and is still actively withdrawing from alcohol. Nursing staff at bedside noted that patient has been agitated, requiring increase in Precedex and multiple doses of PRN Ativan, and has received 10mg of Ativan in a 24 hour period at this time. Patient may benefit from phenobarbital use for acute withdrawal.   6/3: Patient on exam, continues to be agitated, responding to internal stimuli, A/Ox1, while receiving 12mg of Ativan as part of CIWA taper.      Plan  -CIWA standing and PRN w/ Ativan as per primary team, can consider increasing Ativan taper to higher dose taper of 4mg for 6 doses, 3mg for 6 doses, 2mg for 6 doses, and 1mg for 6 doses   -Can also consider switch to use of phenobarbital in lieu of Ativan & Precedex use w/ consistently elevated CIWA scores  -Can consider using Haldol 2mg PO/IV/IM Q 4 hours  PRN for acute agitation, monitor if QTC<500   -SIPAT pending further education about transplant process   -High dose IV Thiamine supplementation  -B12, folate, TSH w/ FT4, PETH monitoring   38-year-old, , , male, with PPHx of AUD, with no past psychiatric admissions, with PMH HTN, PSH gastric sleeve, initially admitted to [facility] for jaundice and confusion, found to have elevated transaminases to 4000s with high MELD and withdrawal symptoms, transferred to [facility] for liver transplant workup. Patient seen by transplant psychiatry as part of liver transplant evaluation.    5/31:Patient seen at bedside with nursing staff in the room, on exam patient was A/Ox1/2, hypersomnolent, with slurred speech, arousable only to physical stimuli. Patient noted that he still responding to internal stimuli on exam, noting that he experiencing AH and tactile while on Precedex at this time, and is still actively withdrawing from alcohol. Nursing staff at bedside noted that patient has been agitated, requiring increase in Precedex and multiple doses of PRN Ativan, and has received 10mg of Ativan in a 24 hour period at this time. Patient may benefit from phenobarbital use for acute withdrawal.   6/3: Patient on exam, continues to be agitated, responding to internal stimuli, A/Ox1, while receiving 12mg of Ativan as part of CIWA taper.      Plan  CHARTING IN PROCESS  -Can also consider switch to use of phenobarbital in lieu of Ativan & Precedex use w/ consistently elevated CIWA scores  -Can consider using Haldol 2mg PO/IV/IM Q 4 hours  PRN for acute agitation, monitor if QTC<500   -SIPAT pending further education about transplant process   -High dose IV Thiamine supplementation  -B12, folate, TSH w/ FT4, PETH monitoring   38-year-old, , , male, with PPHx of AUD, with no past psychiatric admissions, with PMH HTN, PSH gastric sleeve, initially admitted to [facility] for jaundice and confusion, found to have elevated transaminases to 4000s with high MELD and withdrawal symptoms, transferred to [facility] for liver transplant workup. Patient seen by transplant psychiatry as part of liver transplant evaluation.    5/31:Patient seen at bedside with nursing staff in the room, on exam patient was A/Ox1/2, hypersomnolent, with slurred speech, arousable only to physical stimuli. Patient noted that he still responding to internal stimuli on exam, noting that he experiencing AH and tactile while on Precedex at this time, and is still actively withdrawing from alcohol. Nursing staff at bedside noted that patient has been agitated, requiring increase in Precedex and multiple doses of PRN Ativan, and has received 10mg of Ativan in a 24 hour period at this time. Patient may benefit from phenobarbital use for acute withdrawal.   6/3: Patient on exam, continues to be agitated, responding to internal stimuli, A/Ox1, while receiving 12mg of Ativan as part of CIWA taper.      Plan  ***-Can also consider switch to use of phenobarbital in lieu of Ativan & Precedex use w/ consistently elevated CIWA scores  -Standing and Symptom triggered CIWA as per primary team (Change route to IM/IV)  -Discontinue Seroquel, Can consider using Haldol 1mg PO/IV/IM Q 6 HRs for acute agitation, monitor if QTC<500 with standing 5 mg PO HS to aid with mood stabilization  -behavioral disturbance likely in setting of withdrawal, please use Ativan prior to use of antipsychotics for further deterioration  -SIPAT pending further education about transplant process   -High dose IV Thiamine supplementation  -B12, folate, TSH w/ FT4, PETH >400 (5/29)   38-year-old, , , male, with PPHx of AUD, with no past psychiatric admissions, with PMH HTN, PSH gastric sleeve, initially admitted to [facility] for jaundice and confusion, found to have elevated transaminases to 4000s with high MELD and withdrawal symptoms, transferred to [facility] for liver transplant workup. Patient seen by transplant psychiatry as part of liver transplant evaluation.    5/31:Patient seen at bedside with nursing staff in the room, on exam patient was A/Ox1/2, hypersomnolent, with slurred speech, arousable only to physical stimuli. Patient noted that he still responding to internal stimuli on exam, noting that he experiencing AH and tactile while on Precedex at this time, and is still actively withdrawing from alcohol. Nursing staff at bedside noted that patient has been agitated, requiring increase in Precedex and multiple doses of PRN Ativan, and has received 10mg of Ativan in a 24 hour period at this time. Patient may benefit from phenobarbital use for acute withdrawal.   6/3: Patient on exam, continues to be agitated, responding to internal stimuli, A/Ox1, while receiving 12mg of Ativan as part of CIWA taper.      Plan  ***-Can also consider switch to use of phenobarbital in lieu of Ativan & Precedex use w/ consistently elevated CIWA scores  -Standing and Symptom-triggered CIWA as per primary team (Change route to IM/IV)  -Can consider using Haldol 1mg PO/IV/IM Q 6 HRs for acute agitation, monitor if QTC<500   -Consider Haldol 5 mg IV/PO HS to aid with mood stabilization in lieu of Seroquel use   -behavioral disturbance likely in setting of withdrawal, please use Ativan prior to use of antipsychotics for further deterioration  -SIPAT pending further education about transplant process   -High dose IV Thiamine supplementation  -B12, folate, TSH w/ FT4, PETH >400 (5/29)

## 2024-06-03 NOTE — CONSULT NOTE ADULT - CONSULT REQUESTED DATE/TIME
29-May-2024 17:10
29-May-2024 16:04
03-Jun-2024 22:42
30-May-2024 16:05
30-May-2024 19:54
30-May-2024 12:00

## 2024-06-04 LAB
ALBUMIN SERPL ELPH-MCNC: 3.1 G/DL — LOW (ref 3.3–5)
ALBUMIN SERPL ELPH-MCNC: 3.3 G/DL — SIGNIFICANT CHANGE UP (ref 3.3–5)
ALP SERPL-CCNC: 254 U/L — HIGH (ref 40–120)
ALP SERPL-CCNC: 269 U/L — HIGH (ref 40–120)
ALT FLD-CCNC: 349 U/L — HIGH (ref 10–45)
ALT FLD-CCNC: 406 U/L — HIGH (ref 10–45)
AMMONIA BLD-MCNC: 20 UMOL/L — SIGNIFICANT CHANGE UP (ref 11–55)
ANION GAP SERPL CALC-SCNC: 12 MMOL/L — SIGNIFICANT CHANGE UP (ref 5–17)
ANION GAP SERPL CALC-SCNC: 13 MMOL/L — SIGNIFICANT CHANGE UP (ref 5–17)
APTT BLD: 33.8 SEC — SIGNIFICANT CHANGE UP (ref 24.5–35.6)
AST SERPL-CCNC: 128 U/L — HIGH (ref 10–40)
AST SERPL-CCNC: 94 U/L — HIGH (ref 10–40)
BILIRUB SERPL-MCNC: 18.4 MG/DL — HIGH (ref 0.2–1.2)
BILIRUB SERPL-MCNC: 18.5 MG/DL — HIGH (ref 0.2–1.2)
BLD GP AB SCN SERPL QL: NEGATIVE — SIGNIFICANT CHANGE UP
BUN SERPL-MCNC: 10 MG/DL — SIGNIFICANT CHANGE UP (ref 7–23)
BUN SERPL-MCNC: 15 MG/DL — SIGNIFICANT CHANGE UP (ref 7–23)
CALCIUM SERPL-MCNC: 8.3 MG/DL — LOW (ref 8.4–10.5)
CALCIUM SERPL-MCNC: 8.9 MG/DL — SIGNIFICANT CHANGE UP (ref 8.4–10.5)
CHLORIDE SERPL-SCNC: 107 MMOL/L — SIGNIFICANT CHANGE UP (ref 96–108)
CHLORIDE SERPL-SCNC: 108 MMOL/L — SIGNIFICANT CHANGE UP (ref 96–108)
CO2 SERPL-SCNC: 18 MMOL/L — LOW (ref 22–31)
CO2 SERPL-SCNC: 19 MMOL/L — LOW (ref 22–31)
CREAT SERPL-MCNC: 0.75 MG/DL — SIGNIFICANT CHANGE UP (ref 0.5–1.3)
CREAT SERPL-MCNC: 0.77 MG/DL — SIGNIFICANT CHANGE UP (ref 0.5–1.3)
EGFR: 118 ML/MIN/1.73M2 — SIGNIFICANT CHANGE UP
EGFR: 119 ML/MIN/1.73M2 — SIGNIFICANT CHANGE UP
GAS PNL BLDV: SIGNIFICANT CHANGE UP
GLUCOSE BLDC GLUCOMTR-MCNC: 115 MG/DL — HIGH (ref 70–99)
GLUCOSE BLDC GLUCOMTR-MCNC: 126 MG/DL — HIGH (ref 70–99)
GLUCOSE BLDC GLUCOMTR-MCNC: 82 MG/DL — SIGNIFICANT CHANGE UP (ref 70–99)
GLUCOSE BLDC GLUCOMTR-MCNC: 84 MG/DL — SIGNIFICANT CHANGE UP (ref 70–99)
GLUCOSE SERPL-MCNC: 101 MG/DL — HIGH (ref 70–99)
GLUCOSE SERPL-MCNC: 90 MG/DL — SIGNIFICANT CHANGE UP (ref 70–99)
HCT VFR BLD CALC: 35.7 % — LOW (ref 39–50)
HGB BLD-MCNC: 12.8 G/DL — LOW (ref 13–17)
INR BLD: 1.6 RATIO — HIGH (ref 0.85–1.18)
MAGNESIUM SERPL-MCNC: 2.2 MG/DL — SIGNIFICANT CHANGE UP (ref 1.6–2.6)
MAGNESIUM SERPL-MCNC: 2.4 MG/DL — SIGNIFICANT CHANGE UP (ref 1.6–2.6)
MCHC RBC-ENTMCNC: 32.2 PG — SIGNIFICANT CHANGE UP (ref 27–34)
MCHC RBC-ENTMCNC: 35.9 GM/DL — SIGNIFICANT CHANGE UP (ref 32–36)
MCV RBC AUTO: 89.9 FL — SIGNIFICANT CHANGE UP (ref 80–100)
NRBC # BLD: 0 /100 WBCS — SIGNIFICANT CHANGE UP (ref 0–0)
PHOSPHATE SERPL-MCNC: 2.6 MG/DL — SIGNIFICANT CHANGE UP (ref 2.5–4.5)
PHOSPHATE SERPL-MCNC: 2.9 MG/DL — SIGNIFICANT CHANGE UP (ref 2.5–4.5)
PLATELET # BLD AUTO: 189 K/UL — SIGNIFICANT CHANGE UP (ref 150–400)
POTASSIUM SERPL-MCNC: 3.6 MMOL/L — SIGNIFICANT CHANGE UP (ref 3.5–5.3)
POTASSIUM SERPL-MCNC: 4.1 MMOL/L — SIGNIFICANT CHANGE UP (ref 3.5–5.3)
POTASSIUM SERPL-SCNC: 3.6 MMOL/L — SIGNIFICANT CHANGE UP (ref 3.5–5.3)
POTASSIUM SERPL-SCNC: 4.1 MMOL/L — SIGNIFICANT CHANGE UP (ref 3.5–5.3)
PROCALCITONIN SERPL-MCNC: 2.05 NG/ML — HIGH (ref 0.02–0.1)
PROT SERPL-MCNC: 6.4 G/DL — SIGNIFICANT CHANGE UP (ref 6–8.3)
PROT SERPL-MCNC: 6.4 G/DL — SIGNIFICANT CHANGE UP (ref 6–8.3)
PROTHROM AB SERPL-ACNC: 17.4 SEC — HIGH (ref 9.5–13)
RBC # BLD: 3.97 M/UL — LOW (ref 4.2–5.8)
RBC # FLD: 16.4 % — HIGH (ref 10.3–14.5)
RH IG SCN BLD-IMP: POSITIVE — SIGNIFICANT CHANGE UP
SODIUM SERPL-SCNC: 138 MMOL/L — SIGNIFICANT CHANGE UP (ref 135–145)
SODIUM SERPL-SCNC: 139 MMOL/L — SIGNIFICANT CHANGE UP (ref 135–145)
VZV DNA, PCR RESULT: NEGATIVE — SIGNIFICANT CHANGE UP
WBC # BLD: 20.94 K/UL — HIGH (ref 3.8–10.5)
WBC # FLD AUTO: 20.94 K/UL — HIGH (ref 3.8–10.5)

## 2024-06-04 PROCEDURE — 99232 SBSQ HOSP IP/OBS MODERATE 35: CPT | Mod: GC

## 2024-06-04 PROCEDURE — 93010 ELECTROCARDIOGRAM REPORT: CPT

## 2024-06-04 PROCEDURE — 99233 SBSQ HOSP IP/OBS HIGH 50: CPT

## 2024-06-04 RX ORDER — MEROPENEM 1 G/30ML
1000 INJECTION INTRAVENOUS EVERY 8 HOURS
Refills: 0 | Status: DISCONTINUED | OUTPATIENT
Start: 2024-06-04 | End: 2024-06-08

## 2024-06-04 RX ORDER — MEROPENEM 1 G/30ML
1000 INJECTION INTRAVENOUS ONCE
Refills: 0 | Status: COMPLETED | OUTPATIENT
Start: 2024-06-04 | End: 2024-06-04

## 2024-06-04 RX ORDER — MEROPENEM 1 G/30ML
INJECTION INTRAVENOUS
Refills: 0 | Status: DISCONTINUED | OUTPATIENT
Start: 2024-06-04 | End: 2024-06-08

## 2024-06-04 RX ORDER — FLUCONAZOLE 150 MG/1
400 TABLET ORAL EVERY 24 HOURS
Refills: 0 | Status: DISCONTINUED | OUTPATIENT
Start: 2024-06-05 | End: 2024-06-08

## 2024-06-04 RX ORDER — HALOPERIDOL DECANOATE 100 MG/ML
5 INJECTION INTRAMUSCULAR ONCE
Refills: 0 | Status: COMPLETED | OUTPATIENT
Start: 2024-06-04 | End: 2024-06-04

## 2024-06-04 RX ORDER — DEXMEDETOMIDINE HYDROCHLORIDE IN 0.9% SODIUM CHLORIDE 4 UG/ML
0.5 INJECTION INTRAVENOUS
Qty: 200 | Refills: 0 | Status: DISCONTINUED | OUTPATIENT
Start: 2024-06-04 | End: 2024-06-05

## 2024-06-04 RX ORDER — FLUCONAZOLE 150 MG/1
TABLET ORAL
Refills: 0 | Status: DISCONTINUED | OUTPATIENT
Start: 2024-06-04 | End: 2024-06-08

## 2024-06-04 RX ORDER — THIAMINE MONONITRATE (VIT B1) 100 MG
100 TABLET ORAL DAILY
Refills: 0 | Status: DISCONTINUED | OUTPATIENT
Start: 2024-06-04 | End: 2024-06-08

## 2024-06-04 RX ORDER — POTASSIUM PHOSPHATE, MONOBASIC POTASSIUM PHOSPHATE, DIBASIC 236; 224 MG/ML; MG/ML
30 INJECTION, SOLUTION INTRAVENOUS ONCE
Refills: 0 | Status: COMPLETED | OUTPATIENT
Start: 2024-06-04 | End: 2024-06-04

## 2024-06-04 RX ORDER — FLUCONAZOLE 150 MG/1
400 TABLET ORAL ONCE
Refills: 0 | Status: COMPLETED | OUTPATIENT
Start: 2024-06-04 | End: 2024-06-04

## 2024-06-04 RX ORDER — QUETIAPINE FUMARATE 200 MG/1
200 TABLET, FILM COATED ORAL EVERY 12 HOURS
Refills: 0 | Status: DISCONTINUED | OUTPATIENT
Start: 2024-06-04 | End: 2024-06-05

## 2024-06-04 RX ADMIN — LACTULOSE 20 GRAM(S): 10 SOLUTION ORAL at 05:23

## 2024-06-04 RX ADMIN — FLUCONAZOLE 100 MILLIGRAM(S): 150 TABLET ORAL at 10:19

## 2024-06-04 RX ADMIN — Medication 63.75 MILLIMOLE(S): at 20:38

## 2024-06-04 RX ADMIN — HEPARIN SODIUM 5000 UNIT(S): 5000 INJECTION INTRAVENOUS; SUBCUTANEOUS at 05:24

## 2024-06-04 RX ADMIN — QUETIAPINE FUMARATE 200 MILLIGRAM(S): 200 TABLET, FILM COATED ORAL at 17:09

## 2024-06-04 RX ADMIN — Medication 1 PATCH: at 08:00

## 2024-06-04 RX ADMIN — Medication 2 MILLIGRAM(S): at 03:38

## 2024-06-04 RX ADMIN — POTASSIUM PHOSPHATE, MONOBASIC POTASSIUM PHOSPHATE, DIBASIC 83.33 MILLIMOLE(S): 236; 224 INJECTION, SOLUTION INTRAVENOUS at 04:48

## 2024-06-04 RX ADMIN — HEPARIN SODIUM 5000 UNIT(S): 5000 INJECTION INTRAVENOUS; SUBCUTANEOUS at 17:09

## 2024-06-04 RX ADMIN — PIPERACILLIN AND TAZOBACTAM 25 GRAM(S): 4; .5 INJECTION, POWDER, LYOPHILIZED, FOR SOLUTION INTRAVENOUS at 05:24

## 2024-06-04 RX ADMIN — Medication 1 MILLIGRAM(S): at 11:16

## 2024-06-04 RX ADMIN — Medication 1 TABLET(S): at 11:16

## 2024-06-04 RX ADMIN — MEROPENEM 100 MILLIGRAM(S): 1 INJECTION INTRAVENOUS at 10:18

## 2024-06-04 RX ADMIN — Medication 1 PATCH: at 12:00

## 2024-06-04 RX ADMIN — CHLORHEXIDINE GLUCONATE 1 APPLICATION(S): 213 SOLUTION TOPICAL at 05:23

## 2024-06-04 RX ADMIN — Medication 3 MILLIGRAM(S): at 17:09

## 2024-06-04 RX ADMIN — QUETIAPINE FUMARATE 100 MILLIGRAM(S): 200 TABLET, FILM COATED ORAL at 05:23

## 2024-06-04 RX ADMIN — MEROPENEM 100 MILLIGRAM(S): 1 INJECTION INTRAVENOUS at 13:07

## 2024-06-04 RX ADMIN — Medication 1 PATCH: at 11:26

## 2024-06-04 RX ADMIN — Medication 3 MILLIGRAM(S): at 13:02

## 2024-06-04 RX ADMIN — POLYETHYLENE GLYCOL 3350 17 GRAM(S): 17 POWDER, FOR SOLUTION ORAL at 05:23

## 2024-06-04 RX ADMIN — AMLODIPINE BESYLATE 5 MILLIGRAM(S): 2.5 TABLET ORAL at 05:24

## 2024-06-04 RX ADMIN — LACTULOSE 20 GRAM(S): 10 SOLUTION ORAL at 10:19

## 2024-06-04 RX ADMIN — LACTULOSE 20 GRAM(S): 10 SOLUTION ORAL at 13:02

## 2024-06-04 RX ADMIN — MEROPENEM 100 MILLIGRAM(S): 1 INJECTION INTRAVENOUS at 21:22

## 2024-06-04 RX ADMIN — HALOPERIDOL DECANOATE 5 MILLIGRAM(S): 100 INJECTION INTRAMUSCULAR at 17:02

## 2024-06-04 RX ADMIN — PANTOPRAZOLE SODIUM 40 MILLIGRAM(S): 20 TABLET, DELAYED RELEASE ORAL at 05:24

## 2024-06-04 RX ADMIN — Medication 3 MILLIGRAM(S): at 21:23

## 2024-06-04 RX ADMIN — Medication 100 MILLIGRAM(S): at 11:16

## 2024-06-04 RX ADMIN — DEXMEDETOMIDINE HYDROCHLORIDE IN 0.9% SODIUM CHLORIDE 3.98 MICROGRAM(S)/KG/HR: 4 INJECTION INTRAVENOUS at 04:13

## 2024-06-04 RX ADMIN — DEXMEDETOMIDINE HYDROCHLORIDE IN 0.9% SODIUM CHLORIDE 9.94 MICROGRAM(S)/KG/HR: 4 INJECTION INTRAVENOUS at 20:25

## 2024-06-04 RX ADMIN — Medication 2 MILLIGRAM(S): at 08:02

## 2024-06-04 RX ADMIN — Medication 2 MILLIGRAM(S): at 20:20

## 2024-06-04 RX ADMIN — Medication 2 MILLIGRAM(S): at 17:16

## 2024-06-04 RX ADMIN — Medication 3 MILLIGRAM(S): at 10:18

## 2024-06-04 RX ADMIN — LACTULOSE 20 GRAM(S): 10 SOLUTION ORAL at 01:50

## 2024-06-04 RX ADMIN — Medication 2 MILLIGRAM(S): at 19:32

## 2024-06-04 RX ADMIN — Medication 2 MILLIGRAM(S): at 18:50

## 2024-06-04 RX ADMIN — Medication 1 PATCH: at 19:22

## 2024-06-04 NOTE — PROGRESS NOTE ADULT - ASSESSMENT
38M hx HTN, ETOH use (daily drink) PSH gastric sleeve, initially admitted to [facility] for jaundice and confusion, BIBEMS as pt was wondering the streets. Noted to have significantly elevated LFTs (4000s), MELD 47, high CIWA, transfer for liver evaluation iso acute alcoholic hepatitis.    Plan:  NEURO  - AO X3  - Nicotine patch  - Sedation: Precedex, currently held  - Seroquel 100bid for agitation  - Lorazepam PO 2mg q4 standing, 2mg q2 for breakthrough agitation  - Trend ammonia  - Lactulose and rifaximin for hepatic encephalopathy   - Monitor for signs of withdrawal    RESP  - Saturating well on room air  - Maintain O2 saturation >92%  - Daily CXR    CV  - MAP goal >65  - Pressors: None  - Amlodipine 5qd   - 5/29 TTE wnl    GI/NUTRITION  - Diet: Reg  - Protonix PO qd   - Lactulose  - NAC gtt (goal INR <1.5)    RENAL/  - Monitor Corbett output  - albumin 50 q8    HEME  - DVT ppx: SQH    ID  - ABX: s/p CTX, now on Zosyn and rifaximin  - 5/29: BCx - NGTD  - 5/29: +UA, UCx negative    ENDO  - MISS  - Monitor blood glucose     LINES  - PIVs    CODE: FULL  DISPO: SICU

## 2024-06-04 NOTE — PROGRESS NOTE ADULT - SUBJECTIVE AND OBJECTIVE BOX
Transplant Surgery - Multidisciplinary Rounds  --------------------------------------------------------------  Present:   Patient seen and examined with multidisciplinary Transplant team including Surgeon: Dr Dagher, Hepatologist: Dr Beth,  CROW [place]ina, and beside RN during AM rounds. Disciplines not in attendance will be notified of the plan.     HPI: 38M PMH HTN, ETOH use (daily drink) PSH gastric sleeve, initially admitted to [facility] for jaundice and confusion, found to have elevated transaminases to 4000s with high MELD and withdrawal symptoms, transferred to [facility] for liver transplant workup.     Interval Events:   - 500CC LR bolus for hypotension   -  incr seroquel 100 BID (from 50 BID);  - dental consulted  - uptrend in WBC                                                                                                                                                                                                        Potential Discharge date: TBD  Education:  Medications  Plan of care:  See Below    MEDICATIONS  (STANDING):  amLODIPine   Tablet 5 milliGRAM(s) Oral daily  chlorhexidine 2% Cloths 1 Application(s) Topical <User Schedule>  fluconAZOLE IVPB      folic acid 1 milliGRAM(s) Oral daily  heparin   Injectable 5000 Unit(s) SubCutaneous every 12 hours  insulin lispro (ADMELOG) corrective regimen sliding scale   SubCutaneous three times a day before meals  insulin lispro (ADMELOG) corrective regimen sliding scale   SubCutaneous at bedtime  lactulose Syrup 20 Gram(s) Oral every 4 hours  LORazepam     Tablet 3 milliGRAM(s) Oral every 4 hours  meropenem  IVPB      meropenem  IVPB 1000 milliGRAM(s) IV Intermittent every 8 hours  multivitamin 1 Tablet(s) Oral daily  nicotine - 21 mG/24Hr(s) Patch 1 Patch Transdermal daily  pantoprazole    Tablet 40 milliGRAM(s) Oral before breakfast  polyethylene glycol 3350 17 Gram(s) Oral every 12 hours  QUEtiapine 200 milliGRAM(s) Oral every 12 hours  rifAXIMin 550 milliGRAM(s) Oral two times a day  thiamine 100 milliGRAM(s) Oral daily    MEDICATIONS  (PRN):  LORazepam   Injectable 2 milliGRAM(s) IV Push every 2 hours PRN Agitation  LORazepam   Injectable 2 milliGRAM(s) IV Push every 1 hour PRN CIWA-Ar score 8 or greater      PAST MEDICAL & SURGICAL HISTORY:  Hypertension, unspecified type  H/O gastric sleeve    Vital Signs Last 24 Hrs  T(C): 36.5 (04 Jun 2024 07:00), Max: 37.2 (03 Jun 2024 15:00)  T(F): 97.7 (04 Jun 2024 07:00), Max: 98.9 (03 Jun 2024 15:00)  HR: 100 (04 Jun 2024 11:00) (72 - 140)  BP: 99/60 (04 Jun 2024 11:00) (81/48 - 129/68)  BP(mean): 73 (04 Jun 2024 11:00) (60 - 92)  RR: 29 (04 Jun 2024 11:00) (16 - 33)  SpO2: 92% (04 Jun 2024 11:00) (92% - 100%)    Parameters below as of 04 Jun 2024 07:00  Patient On (Oxygen Delivery Method): room air    I&O's Summary    03 Jun 2024 07:01  -  04 Jun 2024 07:00  --------------------------------------------------------  IN: 4638 mL / OUT: 2350 mL / NET: 2288 mL    04 Jun 2024 07:01  -  04 Jun 2024 11:23  --------------------------------------------------------  IN: 19.8 mL / OUT: 375 mL / NET: -355.2 mL                        12.8   20.94 )-----------( 189      ( 04 Jun 2024 01:46 )             35.7     06-04    138  |  107  |  15  ----------------------------<  101<H>  3.6   |  19<L>  |  0.77    Ca    8.3<L>      04 Jun 2024 01:46  Phos  2.9     06-04  Mg     2.2     06-04    TPro  6.4  /  Alb  3.3  /  TBili  18.4<H>  /  DBili  x   /  AST  94<H>  /  ALT  406<H>  /  AlkPhos  254<H>  06-04    Culture - Blood (collected 06-02-24 @ 09:30)  Source: .Blood Blood  Preliminary Report (06-03-24 @ 13:03):    No growth at 24 hours    Culture - Blood (collected 06-02-24 @ 09:15)  Source: .Blood Blood  Preliminary Report (06-03-24 @ 13:03):    No growth at 24 hours    Culture - Urine (collected 05-29-24 @ 17:16)  Source: Clean Catch Clean Catch (Midstream)  Final Report (05-30-24 @ 14:02):    <10,000 CFU/mL Normal Urogenital Aviva    Culture - Blood (collected 05-29-24 @ 13:40)  Source: .Blood Blood-Peripheral  Final Report (06-03-24 @ 18:00):    No growth at 5 days    Culture - Blood (collected 05-29-24 @ 13:30)  Source: .Blood Blood-Peripheral  Final Report (06-03-24 @ 18:00):    No growth at 5 days    Culture - Blood (collected 05-29-24 @ 12:00)  Source: .Blood Blood  Final Report (06-03-24 @ 17:01):    No growth at 5 days    Culture - Blood (collected 05-29-24 @ 11:50)  Source: .Blood Blood  Final Report (06-03-24 @ 17:01):    No growth at 5 days      Review of systems  unable to obtain, AMS      PHYSICAL EXAM:  GENERAL: confused  HEENT:  NCAT, + scleral icterus  CHEST: no resp distress  HEART:  RRR  ABDOMEN:  Soft, non-tender, non-distended, no masses  EXTREMITIES:  No cyanosis, clubbing, or edema  SKIN:  No rash/erythema/ecchymoses/petechiae/wounds/abscess/warm/dry  NEURO:  Alert and oriented x 1

## 2024-06-04 NOTE — PROGRESS NOTE ADULT - SUBJECTIVE AND OBJECTIVE BOX
Follow Up:      Interval History/ROS: No acute events. Patient is withdrawn and answers limited questions. Discussed with RN at bedside - continues to have bowel movements, semi-formed, roughly 7-8 episodes yesterday.    Allergies  No Known Allergies        ANTIMICROBIALS:  fluconAZOLE IVPB    meropenem  IVPB    meropenem  IVPB 1000 every 8 hours  rifAXIMin 550 two times a day      OTHER MEDS:  MEDICATIONS  (STANDING):  amLODIPine   Tablet 5 daily  heparin   Injectable 5000 every 12 hours  insulin lispro (ADMELOG) corrective regimen sliding scale  three times a day before meals  insulin lispro (ADMELOG) corrective regimen sliding scale  at bedtime  lactulose Syrup 20 every 4 hours  LORazepam     Tablet 3 every 4 hours  LORazepam   Injectable 2 every 2 hours PRN  LORazepam   Injectable 2 every 1 hour PRN  pantoprazole    Tablet 40 before breakfast  polyethylene glycol 3350 17 every 12 hours  QUEtiapine 200 every 12 hours      Vital Signs Last 24 Hrs  T(C): 36.4 (04 Jun 2024 11:00), Max: 37.2 (03 Jun 2024 15:00)  T(F): 97.6 (04 Jun 2024 11:00), Max: 98.9 (03 Jun 2024 15:00)  HR: 99 (04 Jun 2024 13:00) (79 - 140)  BP: 107/62 (04 Jun 2024 13:00) (81/48 - 129/68)  BP(mean): 78 (04 Jun 2024 13:00) (60 - 92)  RR: 22 (04 Jun 2024 13:00) (17 - 38)  SpO2: 97% (04 Jun 2024 13:00) (92% - 100%)    Parameters below as of 04 Jun 2024 13:00  Patient On (Oxygen Delivery Method): room air        PHYSICAL EXAM:  Constitutional:no acute distress  Eyes:TEO, EOMI  Ear/Nose/Throat: no oral lesions, 	  Respiratory: clear BL  Cardiovascular: S1S2  Gastrointestinal:soft, (+) BS, no tenderness  Extremities:no e/e/c  No Lymphadenopathy  IV sites not inflammed.                                12.8   20.94 )-----------( 189      ( 04 Jun 2024 01:46 )             35.7       06-04    138  |  107  |  15  ----------------------------<  101<H>  3.6   |  19<L>  |  0.77    Ca    8.3<L>      04 Jun 2024 01:46  Phos  2.9     06-04  Mg     2.2     06-04    TPro  6.4  /  Alb  3.3  /  TBili  18.4<H>  /  DBili  x   /  AST  94<H>  /  ALT  406<H>  /  AlkPhos  254<H>  06-04      Urinalysis Basic - ( 04 Jun 2024 01:46 )    Color: x / Appearance: x / SG: x / pH: x  Gluc: 101 mg/dL / Ketone: x  / Bili: x / Urobili: x   Blood: x / Protein: x / Nitrite: x   Leuk Esterase: x / RBC: x / WBC x   Sq Epi: x / Non Sq Epi: x / Bacteria: x        MICROBIOLOGY:  v  .Blood Blood  06-02-24   No growth at 48 Hours  --  --      .Blood Blood  06-02-24   No growth at 48 Hours  --  --      Clean Catch Clean Catch (Midstream)  05-29-24   <10,000 CFU/mL Normal Urogenital Aviva  --  --      .Blood Blood-Peripheral  05-29-24   No growth at 5 days  --  --      .Blood Blood-Peripheral  05-29-24   No growth at 5 days  --  --      .Blood Blood  05-29-24   No growth at 5 days  --  --      .Blood Blood  05-29-24   No growth at 5 days  --  --        HIV-1 RNA Quantitative, Viral Load Log: NOT DET. lg /mL (06-01-24 @ 07:30)  CMV IgG Antibody: 4.50 U/mL (05-29-24 @ 17:13)  Toxoplasma IgG Screen: 48.5 IU/mL (05-29-24 @ 17:13)    CMVPCR Log: NotDetec Xug96XM/mL (05-29 @ 17:13)        RADIOLOGY:   Follow Up:      Interval History/ROS: No acute events. Patient is withdrawn and answers limited questions. Discussed with RN at bedside - continues to have bowel movements, semi-formed, roughly 7-8 episodes yesterday.    Allergies  No Known Allergies        ANTIMICROBIALS:  fluconAZOLE IVPB    meropenem  IVPB    meropenem  IVPB 1000 every 8 hours  rifAXIMin 550 two times a day      OTHER MEDS:  MEDICATIONS  (STANDING):  amLODIPine   Tablet 5 daily  heparin   Injectable 5000 every 12 hours  insulin lispro (ADMELOG) corrective regimen sliding scale  three times a day before meals  insulin lispro (ADMELOG) corrective regimen sliding scale  at bedtime  lactulose Syrup 20 every 4 hours  LORazepam     Tablet 3 every 4 hours  LORazepam   Injectable 2 every 2 hours PRN  LORazepam   Injectable 2 every 1 hour PRN  pantoprazole    Tablet 40 before breakfast  polyethylene glycol 3350 17 every 12 hours  QUEtiapine 200 every 12 hours      Vital Signs Last 24 Hrs  T(C): 36.4 (04 Jun 2024 11:00), Max: 37.2 (03 Jun 2024 15:00)  T(F): 97.6 (04 Jun 2024 11:00), Max: 98.9 (03 Jun 2024 15:00)  HR: 99 (04 Jun 2024 13:00) (79 - 140)  BP: 107/62 (04 Jun 2024 13:00) (81/48 - 129/68)  BP(mean): 78 (04 Jun 2024 13:00) (60 - 92)  RR: 22 (04 Jun 2024 13:00) (17 - 38)  SpO2: 97% (04 Jun 2024 13:00) (92% - 100%)    Parameters below as of 04 Jun 2024 13:00  Patient On (Oxygen Delivery Method): room air        PHYSICAL EXAM:  Constitutional:no acute distress  Eyes:TEO, EOMI  Ear/Nose/Throat: no oral lesions, 	  Respiratory: clear BL  Cardiovascular: S1S2  Gastrointestinal:soft, (+) BS, no tenderness  Extremities:no e/e/c  No Lymphadenopathy  IV sites not inflammed.                                12.8   20.94 )-----------( 189      ( 04 Jun 2024 01:46 )             35.7       06-04    138  |  107  |  15  ----------------------------<  101<H>  3.6   |  19<L>  |  0.77    Ca    8.3<L>      04 Jun 2024 01:46  Phos  2.9     06-04  Mg     2.2     06-04    TPro  6.4  /  Alb  3.3  /  TBili  18.4<H>  /  DBili  x   /  AST  94<H>  /  ALT  406<H>  /  AlkPhos  254<H>  06-04      Urinalysis Basic - ( 04 Jun 2024 01:46 )    Color: x / Appearance: x / SG: x / pH: x  Gluc: 101 mg/dL / Ketone: x  / Bili: x / Urobili: x   Blood: x / Protein: x / Nitrite: x   Leuk Esterase: x / RBC: x / WBC x   Sq Epi: x / Non Sq Epi: x / Bacteria: x        MICROBIOLOGY:  v  .Blood Blood  06-02-24   No growth at 48 Hours  --  --      .Blood Blood  06-02-24   No growth at 48 Hours  --  --      Clean Catch Clean Catch (Midstream)  05-29-24   <10,000 CFU/mL Normal Urogenital Aviva  --  --      .Blood Blood-Peripheral  05-29-24   No growth at 5 days  --  --      .Blood Blood-Peripheral  05-29-24   No growth at 5 days  --  --      .Blood Blood  05-29-24   No growth at 5 days  --  --      .Blood Blood  05-29-24   No growth at 5 days  --  --        HIV-1 RNA Quantitative, Viral Load Log: NOT DET. lg /mL (06-01-24 @ 07:30)  CMV IgG Antibody: 4.50 U/mL (05-29-24 @ 17:13)  Toxoplasma IgG Screen: 48.5 IU/mL (05-29-24 @ 17:13)    CMVPCR Log: NotDetec Kit25NZ/mL (05-29 @ 17:13)        RADIOLOGY:    < from: CT Abdomen and Pelvis w/ IV Cont (05.29.24 @ 11:39) >  IMPRESSION:  Diffuse, marked hepatic steatosis which was not seen on 11/27/2017.   Please correlate clinically.    Question of mild gallbladder wall thickening. If patient has right upper   quadrant pain, right upper quadrant ultrasound would be recommended.    No focal lung consolidation. Question of mild cardiomegaly. Please   correlate with PA and lateral chest x-ray.    < end of copied text >

## 2024-06-04 NOTE — PROGRESS NOTE ADULT - SUBJECTIVE AND OBJECTIVE BOX
Interval events:  - Ativan x1 for tachycardia 2/2 possible withdrawal  - Limited dental exam without acute findings    NEURO  RASS (if intubated): 		CAM ICU (if concern for delirium):  Exam: AO X3  Meds: dexMEDEtomidine Infusion 0.2 MICROgram(s)/kG/Hr IV Continuous <Continuous>  LORazepam     Tablet 2 milliGRAM(s) Oral every 4 hours  LORazepam   Injectable 2 milliGRAM(s) IV Push every 2 hours PRN Agitation  LORazepam   Injectable 2 milliGRAM(s) IV Push every 1 hour PRN CIWA-Ar score 8 or greater  QUEtiapine 100 milliGRAM(s) Oral every 12 hours      RESPIRATORY  RR: 24 (06-03-24 @ 23:00) (16 - 28)  SpO2: 99% (06-03-24 @ 23:00) (93% - 100%)  Wt(kg): --  Exam: nonlabored respirations  Mechanical Ventilation:     Meds:     CARDIOVASCULAR  HR: 128 (06-03-24 @ 23:00) (72 - 140)  BP: 97/60 (06-03-24 @ 23:00) (81/48 - 155/100)  BP(mean): 73 (06-03-24 @ 23:00) (60 - 122)  ABP: --  ABP(mean): --  Wt(kg): --  CVP(cm H2O): --  VBG - ( 02 Jun 2024 22:21 )  pH: 7.40  /  pCO2: 36    /  pO2: 62    / HCO3: 22    / Base Excess: -2.0  /  SaO2: 93.0   Lactate: 1.1                Exam: sinus tachycardia  Meds: amLODIPine   Tablet 5 milliGRAM(s) Oral daily      GI/NUTRITION  Exam: soft, nontender  Diet: regular  Meds: lactulose Syrup 20 Gram(s) Oral every 4 hours  pantoprazole    Tablet 40 milliGRAM(s) Oral before breakfast  polyethylene glycol 3350 17 Gram(s) Oral every 12 hours      GENITOURINARY  I&O's Detail    06-02 @ 07:01  -  06-03 @ 07:00  --------------------------------------------------------  IN:    Dexmedetomidine: 710.4 mL    IV PiggyBack: 833.1 mL    IV PiggyBack: 150 mL    IV PiggyBack: 1058.4 mL  Total IN: 2751.9 mL    OUT:    Indwelling Catheter - Urethral (mL): 5495 mL  Total OUT: 5495 mL    Total NET: -2743.1 mL      06-03 @ 07:01  -  06-04 @ 01:31  --------------------------------------------------------  IN:    Dexmedetomidine: 108.6 mL    IV PiggyBack: 150 mL    IV PiggyBack: 749.8 mL    IV PiggyBack: 617.4 mL    Lactated Ringers Bolus: 500 mL    Oral Fluid: 1006 mL  Total IN: 3131.8 mL    OUT:    Indwelling Catheter - Urethral (mL): 1665 mL  Total OUT: 1665 mL    Total NET: 1466.8 mL          06-03    141  |  111<H>  |  9   ----------------------------<  100<H>  3.4<L>   |  17<L>  |  0.57    Ca    8.3<L>      03 Jun 2024 11:12  Phos  2.2     06-03  Mg     2.0     06-03    TPro  5.9<L>  /  Alb  2.9<L>  /  TBili  16.7<H>  /  DBili  x   /  AST  77<H>  /  ALT  438<H>  /  AlkPhos  238<H>  06-03    Meds: folic acid 1 milliGRAM(s) Oral daily  multivitamin 1 Tablet(s) Oral daily  thiamine Injectable 100 milliGRAM(s) IV Push daily      HEMATOLOGIC  Meds: heparin   Injectable 5000 Unit(s) SubCutaneous every 12 hours                          13.6   17.93 )-----------( 177      ( 02 Jun 2024 22:25 )             39.4     PT/INR - ( 02 Jun 2024 22:25 )   PT: 19.3 sec;   INR: 1.87 ratio         PTT - ( 02 Jun 2024 22:25 )  PTT:30.9 sec    INFECTIOUS DISEASES  T(C): 36.8 (06-03-24 @ 19:00), Max: 37.2 (06-03-24 @ 15:00)  Wt(kg): --    Recent Cultures:  Specimen Source: .Blood Blood, 06-02 @ 09:30; Results   No growth at 24 hours; Gram Stain: --; Organism: --  Specimen Source: .Blood Blood, 06-02 @ 09:15; Results   No growth at 24 hours; Gram Stain: --; Organism: --  Specimen Source: Clean Catch Clean Catch (Midstream), 05-29 @ 17:16; Results   <10,000 CFU/mL Normal Urogenital Aviva; Gram Stain: --; Organism: --  Specimen Source: .Blood Blood-Peripheral, 05-29 @ 13:40; Results   No growth at 5 days; Gram Stain: --; Organism: --  Specimen Source: .Blood Blood-Peripheral, 05-29 @ 13:30; Results   No growth at 5 days; Gram Stain: --; Organism: --  Specimen Source: .Blood Blood, 05-29 @ 12:00; Results   No growth at 5 days; Gram Stain: --; Organism: --  Specimen Source: .Blood Blood, 05-29 @ 11:50; Results   No growth at 5 days; Gram Stain: --; Organism: --    Meds: piperacillin/tazobactam IVPB.. 3.375 Gram(s) IV Intermittent every 8 hours  rifAXIMin 550 milliGRAM(s) Oral two times a day      ENDOCRINE  Capillary Blood Glucose    Meds: insulin lispro (ADMELOG) corrective regimen sliding scale   SubCutaneous three times a day before meals  insulin lispro (ADMELOG) corrective regimen sliding scale   SubCutaneous at bedtime        OTHER MEDICATIONS:  acetylcysteine IVPB 12 Gram(s) IV Intermittent every 24 hours  chlorhexidine 2% Cloths 1 Application(s) Topical <User Schedule>  nicotine - 21 mG/24Hr(s) Patch 1 Patch Transdermal daily      IMAGING:

## 2024-06-04 NOTE — PROGRESS NOTE ADULT - ASSESSMENT
38M PMH HTN, ETOH use (daily drink) PSH gastric sleeve, initially admitted to [facility] for jaundice and confusion, found to have elevated transaminases to 4000s with high MELD and withdrawal symptoms, transferred to [facility] for liver transplant workup.     [] Acute alc hep  [] ETOH Cirrhosis   - LFTs trending down, stop NAC gtt  - Delirium: ?Alcohol withdrawal: inc seroquel 200mg bid, dced precedex gtt, incr ativan,   - HE: cont lactulose, add rifaximin  - Rising WBC: Repeat Blood cxs, dc zosyn, boraden to Layne/Fluc   - BID lab work   - Regular diet   - Ongoing liver transplant eval

## 2024-06-04 NOTE — PROGRESS NOTE ADULT - ASSESSMENT
38M PMH HTN, ETOH use (daily drink) PSH gastric sleeve, initially admitted to [facility] for jaundice and confusion, found to have elevated transaminases to 4000s with high MELD and withdrawal symptoms, transferred to [facility] for liver transplant workup.     #Acute liver failure in the setting of decompensated alcoholic cirrhosis  - Calculated MELD 3 score on 6/4 - 25. Presented w/ elevated liver enzymes, predominantly hepatocellular pattern, now AST/ALT trending down, w/ bili rising. INR has been slowly trending down. Ammonia level stable at 62.   - hepatitis B surface antibody, hepatitis B surface antigen, hepatitis core antibody, hepatitis C NR   - EV: No prior EGD.   - CT chest/ A/P with diffuse, marked hepatic steatosis   - CT head with no acute intracranial hemorrhage, mass effect or midline shift.  - MERLY negative, anti-mitochondrial antibody <1:20, anti-smooth muscle antibody <1:20, immunoglobulins (IgG 1022, IgM 122, IgA quantitative 502) for autoimmune etiologies. Utox neg.   - infectious work up neg - CTX d/c on 6/1.     Recommendations:   - dc NAC drip   - repeat infectious workup   - broaden abx to fluconazole and meropenem   - benzodiazepines for alcohol withdrawal   - Continue with lactulose and rifaximin for HE, goal 3-5 BMs per day.   - Please obtain daily lactate and ammonia levels.   - Daily MELD labs.     Discussed the case with Dr. Beth.    All recommendations are tentative until note is attested by attending.     Marilyn Naylor, PGY-5  Gastroenterology/Hepatology Fellow  Available on [employer] Teams  [phone] ([facility] Short Range Pager)  [phone] ([facility] Long Range Pager)    On Weekends/Holidays (All day) and Weekdays after 5 PM to 8AM:  For non-urgent consults, please email [email or web address]  For emergent consults, please contact on call GI team.  See Amion schedule ([facility]), [employer] system ([facility]), or call hospital  ([facility])

## 2024-06-04 NOTE — PROGRESS NOTE ADULT - SUBJECTIVE AND OBJECTIVE BOX
Gastroenterology/Hepatology Progress Note    Interval Events:   - no acute ON events   - seroquel increased to 100BID  - patient able to interact and answer questions     Allergies:  No Known Allergies      Hospital Medications:  amLODIPine   Tablet 5 milliGRAM(s) Oral daily  chlorhexidine 2% Cloths 1 Application(s) Topical <User Schedule>  fluconAZOLE IVPB      folic acid 1 milliGRAM(s) Oral daily  heparin   Injectable 5000 Unit(s) SubCutaneous every 12 hours  insulin lispro (ADMELOG) corrective regimen sliding scale   SubCutaneous three times a day before meals  insulin lispro (ADMELOG) corrective regimen sliding scale   SubCutaneous at bedtime  lactulose Syrup 20 Gram(s) Oral every 4 hours  LORazepam     Tablet 3 milliGRAM(s) Oral every 4 hours  LORazepam   Injectable 2 milliGRAM(s) IV Push every 2 hours PRN  LORazepam   Injectable 2 milliGRAM(s) IV Push every 1 hour PRN  meropenem  IVPB      meropenem  IVPB 1000 milliGRAM(s) IV Intermittent every 8 hours  multivitamin 1 Tablet(s) Oral daily  nicotine - 21 mG/24Hr(s) Patch 1 Patch Transdermal daily  pantoprazole    Tablet 40 milliGRAM(s) Oral before breakfast  polyethylene glycol 3350 17 Gram(s) Oral every 12 hours  QUEtiapine 200 milliGRAM(s) Oral every 12 hours  rifAXIMin 550 milliGRAM(s) Oral two times a day  thiamine 100 milliGRAM(s) Oral daily      ROS: 14 point ROS negative unless otherwise state in subjective    PHYSICAL EXAM:   Vital Signs:  Vital Signs Last 24 Hrs  T(C): 36.5 (04 Jun 2024 07:00), Max: 37.2 (03 Jun 2024 15:00)  T(F): 97.7 (04 Jun 2024 07:00), Max: 98.9 (03 Jun 2024 15:00)  HR: 100 (04 Jun 2024 11:00) (77 - 140)  BP: 99/60 (04 Jun 2024 11:00) (81/48 - 129/68)  BP(mean): 73 (04 Jun 2024 11:00) (60 - 92)  RR: 29 (04 Jun 2024 11:00) (17 - 33)  SpO2: 92% (04 Jun 2024 11:00) (92% - 100%)    Parameters below as of 04 Jun 2024 07:00  Patient On (Oxygen Delivery Method): room air      Daily     Daily     GENERAL:  ill appearing, lethargic  HEENT:  NCAT, + scleral icterus  CHEST: no resp distress  HEART:  RRR  ABDOMEN:  Soft, non-tender, non-distended,   EXTREMITIES:  No LE edema b/l  SKIN:  Jaundiced  NEURO:  Sitting up in bed, able to answer questions at this time     LABS:                        12.8   20.94 )-----------( 189      ( 04 Jun 2024 01:46 )             35.7     Mean Cell Volume: 89.9 fl (06-04-24 @ 01:46)    06-04    138  |  107  |  15  ----------------------------<  101<H>  3.6   |  19<L>  |  0.77    Ca    8.3<L>      04 Jun 2024 01:46  Phos  2.9     06-04  Mg     2.2     06-04    TPro  6.4  /  Alb  3.3  /  TBili  18.4<H>  /  DBili  x   /  AST  94<H>  /  ALT  406<H>  /  AlkPhos  254<H>  06-04    LIVER FUNCTIONS - ( 04 Jun 2024 01:46 )  Alb: 3.3 g/dL / Pro: 6.4 g/dL / ALK PHOS: 254 U/L / ALT: 406 U/L / AST: 94 U/L / GGT: x           PT/INR - ( 04 Jun 2024 01:46 )   PT: 17.4 sec;   INR: 1.60 ratio         PTT - ( 04 Jun 2024 01:46 )  PTT:33.8 sec  Urinalysis Basic - ( 04 Jun 2024 01:46 )    Color: x / Appearance: x / SG: x / pH: x  Gluc: 101 mg/dL / Ketone: x  / Bili: x / Urobili: x   Blood: x / Protein: x / Nitrite: x   Leuk Esterase: x / RBC: x / WBC x   Sq Epi: x / Non Sq Epi: x / Bacteria: x      Amylase Serum--      Lipase serum--       [id number]        Imaging:

## 2024-06-04 NOTE — PROGRESS NOTE ADULT - ASSESSMENT
38M PMH HTN, ETOH use (daily drink) PSH gastric sleeve, initially admitted to [facility] for jaundice and confusion, found to have elevated transaminases to 4000s with high MELD and withdrawal symptoms, transferred to [facility] for liver transplant workup.     MRSA/MSSA nasal PCR (May 29) negative  Urine culture (May 29) no growth  Blood cultures (May 29) no growth to date    CT neck (May 29) Enlarged palatine tonsils without hyperemia or surrounding infiltrative changes.  CT chest (May 29) no focal consolidation  CT abdomen pelvis (May 29) Diffuse, marked hepatic steatosis, Question of mild gallbladder wall thickening    #Acute liver injury, transaminitis  Hepatitis A IgM (May 29) negative  Hepatitis B DNA by PCR (May 29) negative  Hepatitis C RNA (May 29) negative  CMV by PCR (May 29) negative  HSV 1/2 serum PCR Negative  Adenovirus serum PCR was inconclusive  Varicella DNA PCR negative  HSV PCR 1/2 is negative  Negative HIV viral load      #Preliver transplant evaluation  COVID19 Ortiz Antibody positive  COVID19 Nucleocapsid Antibody positive  HAV IgG positive  HBVs Ab Negative, HBVs Ag Negative, HBVc Ab negative, HBV PCR Negative  HCV Ab negative, HCV PCR Negative  HSV 1 IgG positive  HSV 2 IgG Equivocal  EBV IgG positive  CMV IgG positive  VZV IgG positive  Measles IgG negative, Mumps IgG negative, Rubella Equivocal  HIV Ag/Ab by CMIA negative  Toxoplasma IgG positive  Strongyloides Ab negative  Coccidioides antibody negative  Quantiferon indeterminate  --No absolute ID contraindication for OLT  -- Follow-up on Trypanosoma cruzi Ab      #Encounter to Vaccinate Patient - Will defer vaccinations until further clinical stability given acuity of presentation  COVID19: Would benefit from COVID19 4209-1195 Vaccine Dose  Influenza: Will require with next season  Pneumococcal: Would benefit from PCV20  HAV: Immune, would not require further vaccination  HBV: Would benefit from Heplisav vaccination  MMR: Not mumps or rubeola immune, ideally would require dose of MMR but this would be a live vaccine  Varicella: Immune, would not require further vaccination  Shingles: Will require Shingrix  Tdap: Will require Tdap   38M PMH HTN, ETOH use (daily drink) PSH gastric sleeve, initially admitted to [facility] for jaundice and confusion, found to have elevated transaminases to 4000s with high MELD and withdrawal symptoms, transferred to [facility] for liver transplant workup.     MRSA/MSSA nasal PCR (May 29) negative  Urine culture (May 29) no growth  Blood cultures (May 29) no growth to date    CT neck (May 29) Enlarged palatine tonsils without hyperemia or surrounding infiltrative changes.  CT chest (May 29) no focal consolidation  CT abdomen pelvis (May 29) Diffuse, marked hepatic steatosis, Question of mild gallbladder wall thickening    #Acute liver injury, transaminitis  Hepatitis A IgM (May 29) negative  Hepatitis B DNA by PCR (May 29) negative  Hepatitis C RNA (May 29) negative  CMV by PCR (May 29) negative  HSV 1/2 serum PCR Negative  Adenovirus serum PCR was inconclusive  Varicella DNA PCR negative  HSV PCR 1/2 is negative  Negative HIV viral load      #Preliver transplant evaluation  COVID19 Ortiz Antibody positive  COVID19 Nucleocapsid Antibody positive  HAV IgG positive  HBVs Ab Negative, HBVs Ag Negative, HBVc Ab negative, HBV PCR Negative  HCV Ab negative, HCV PCR Negative  HSV 1 IgG positive  HSV 2 IgG Equivocal  EBV IgG positive  CMV IgG positive  VZV IgG positive  Measles IgG negative, Mumps IgG negative, Rubella Equivocal  HIV Ag/Ab by CMIA negative  Toxoplasma IgG positive  Strongyloides Ab negative  Coccidioides antibody negative  Quantiferon indeterminate - ordered T-spot for further work-up (ordered as MTB IGRA-immunocompromise)  --No absolute ID contraindication for OLT  -- Follow-up on Trypanosoma cruzi Ab      #Leukocytosis  s/p course of ceftriaxone 5/29-6/1 and Zosyn 6/2-6/4  - continue meropenem and fluconazole for now  - f/u repeat blood cultures  - would obtain RUQ US to r/o evolving gallbladder pathology as seen on recent CT a/p      #Encounter to Vaccinate Patient - Will defer vaccinations until further clinical stability given acuity of presentation  COVID19: Would benefit from COVID19 9747-2566 Vaccine Dose  Influenza: Will require with next season  Pneumococcal: Would benefit from PCV20  HAV: Immune, would not require further vaccination  HBV: Would benefit from Heplisav vaccination  MMR: Not mumps or rubeola immune, ideally would require dose of MMR but this would be a live vaccine  Varicella: Immune, would not require further vaccination  Shingles: Will require Shingrix  Tdap: Will require Tdap   38M PMH HTN, ETOH use (daily drink) PSH gastric sleeve, initially admitted to [facility] for jaundice and confusion, found to have elevated transaminases to 4000s with high MELD and withdrawal symptoms, transferred to [facility] for liver transplant workup.     MRSA/MSSA nasal PCR (May 29) negative  Urine culture (May 29) no growth  Blood cultures (May 29) no growth to date    CT neck (May 29) Enlarged palatine tonsils without hyperemia or surrounding infiltrative changes.  CT chest (May 29) no focal consolidation  CT abdomen pelvis (May 29) Diffuse, marked hepatic steatosis, Question of mild gallbladder wall thickening    #Acute liver injury, transaminitis  Hepatitis A IgM (May 29) negative  Hepatitis B DNA by PCR (May 29) negative  Hepatitis C RNA (May 29) negative  CMV by PCR (May 29) negative  HSV 1/2 serum PCR Negative  Adenovirus serum PCR was inconclusive  Varicella DNA PCR negative  HSV PCR 1/2 is negative  Negative HIV viral load      #Preliver transplant evaluation  COVID19 Ortiz Antibody positive  COVID19 Nucleocapsid Antibody positive  HAV IgG positive  HBVs Ab Negative, HBVs Ag Negative, HBVc Ab negative, HBV PCR Negative  HCV Ab negative, HCV PCR Negative  HSV 1 IgG positive  HSV 2 IgG Equivocal  EBV IgG positive  CMV IgG positive  VZV IgG positive  Measles IgG negative, Mumps IgG negative, Rubella Equivocal  HIV Ag/Ab by CMIA negative  Toxoplasma IgG positive  Strongyloides Ab negative  Coccidioides antibody negative  Quantiferon indeterminate - ordered T-spot for further work-up (ordered as MTB IGRA-immunocompromise)  --No absolute ID contraindication for OLT listing  -- Follow-up on Trypanosoma cruzi Ab      #Leukocytosis  s/p course of ceftriaxone 5/29-6/1 and Zosyn 6/2-6/4  - continue meropenem and fluconazole for now  - f/u repeat blood cultures  - would obtain RUQ US to r/o evolving gallbladder pathology as seen on recent CT a/p      #Encounter to Vaccinate Patient - Will defer vaccinations until further clinical stability given acuity of presentation  COVID19: Would benefit from COVID19 4302-0610 Vaccine Dose  Influenza: Will require with next season  Pneumococcal: Would benefit from PCV20  HAV: Immune, would not require further vaccination  HBV: Would benefit from Heplisav vaccination  MMR: Not mumps or rubeola immune, ideally would require dose of MMR but this would be a live vaccine  Varicella: Immune, would not require further vaccination  Shingles: Will require Shingrix  Tdap: Will require Tdap

## 2024-06-05 LAB
ALBUMIN SERPL ELPH-MCNC: 3 G/DL — LOW (ref 3.3–5)
ALP SERPL-CCNC: 281 U/L — HIGH (ref 40–120)
ALT FLD-CCNC: 323 U/L — HIGH (ref 10–45)
AMMONIA BLD-MCNC: 30 UMOL/L — SIGNIFICANT CHANGE UP (ref 11–55)
ANION GAP SERPL CALC-SCNC: 14 MMOL/L — SIGNIFICANT CHANGE UP (ref 5–17)
APTT BLD: 34.1 SEC — SIGNIFICANT CHANGE UP (ref 24.5–35.6)
AST SERPL-CCNC: 119 U/L — HIGH (ref 10–40)
BILIRUB SERPL-MCNC: 19.2 MG/DL — HIGH (ref 0.2–1.2)
BUN SERPL-MCNC: 9 MG/DL — SIGNIFICANT CHANGE UP (ref 7–23)
CALCIUM SERPL-MCNC: 9.2 MG/DL — SIGNIFICANT CHANGE UP (ref 8.4–10.5)
CHLORIDE SERPL-SCNC: 106 MMOL/L — SIGNIFICANT CHANGE UP (ref 96–108)
CO2 SERPL-SCNC: 19 MMOL/L — LOW (ref 22–31)
CREAT SERPL-MCNC: 0.6 MG/DL — SIGNIFICANT CHANGE UP (ref 0.5–1.3)
EGFR: 128 ML/MIN/1.73M2 — SIGNIFICANT CHANGE UP
GLUCOSE BLDC GLUCOMTR-MCNC: 136 MG/DL — HIGH (ref 70–99)
GLUCOSE BLDC GLUCOMTR-MCNC: 89 MG/DL — SIGNIFICANT CHANGE UP (ref 70–99)
GLUCOSE SERPL-MCNC: 79 MG/DL — SIGNIFICANT CHANGE UP (ref 70–99)
HCT VFR BLD CALC: 38.4 % — LOW (ref 39–50)
HGB BLD-MCNC: 13.1 G/DL — SIGNIFICANT CHANGE UP (ref 13–17)
INR BLD: 1.3 RATIO — HIGH (ref 0.85–1.18)
MAGNESIUM SERPL-MCNC: 2.3 MG/DL — SIGNIFICANT CHANGE UP (ref 1.6–2.6)
MCHC RBC-ENTMCNC: 32.3 PG — SIGNIFICANT CHANGE UP (ref 27–34)
MCHC RBC-ENTMCNC: 34.1 GM/DL — SIGNIFICANT CHANGE UP (ref 32–36)
MCV RBC AUTO: 94.6 FL — SIGNIFICANT CHANGE UP (ref 80–100)
NRBC # BLD: 0 /100 WBCS — SIGNIFICANT CHANGE UP (ref 0–0)
PETH 16:0/18:1: >400 NG/ML — HIGH
PETH 16:0/18:2: >400 NG/ML — HIGH
PETH COMMENTS: SIGNIFICANT CHANGE UP
PHOSPHATE SERPL-MCNC: 2.5 MG/DL — SIGNIFICANT CHANGE UP (ref 2.5–4.5)
PLATELET # BLD AUTO: 180 K/UL — SIGNIFICANT CHANGE UP (ref 150–400)
POTASSIUM SERPL-MCNC: 4.4 MMOL/L — SIGNIFICANT CHANGE UP (ref 3.5–5.3)
POTASSIUM SERPL-SCNC: 4.4 MMOL/L — SIGNIFICANT CHANGE UP (ref 3.5–5.3)
PROT SERPL-MCNC: 6.9 G/DL — SIGNIFICANT CHANGE UP (ref 6–8.3)
PROTHROM AB SERPL-ACNC: 13.5 SEC — HIGH (ref 9.5–13)
RBC # BLD: 4.06 M/UL — LOW (ref 4.2–5.8)
RBC # FLD: 18.3 % — HIGH (ref 10.3–14.5)
SODIUM SERPL-SCNC: 139 MMOL/L — SIGNIFICANT CHANGE UP (ref 135–145)
WBC # BLD: 14.67 K/UL — HIGH (ref 3.8–10.5)
WBC # FLD AUTO: 14.67 K/UL — HIGH (ref 3.8–10.5)

## 2024-06-05 PROCEDURE — 99233 SBSQ HOSP IP/OBS HIGH 50: CPT

## 2024-06-05 PROCEDURE — 93970 EXTREMITY STUDY: CPT | Mod: 26

## 2024-06-05 PROCEDURE — 76705 ECHO EXAM OF ABDOMEN: CPT | Mod: 26

## 2024-06-05 PROCEDURE — 99232 SBSQ HOSP IP/OBS MODERATE 35: CPT | Mod: GC

## 2024-06-05 RX ORDER — QUETIAPINE FUMARATE 200 MG/1
200 TABLET, FILM COATED ORAL
Refills: 0 | Status: DISCONTINUED | OUTPATIENT
Start: 2024-06-05 | End: 2024-06-07

## 2024-06-05 RX ORDER — QUETIAPINE FUMARATE 200 MG/1
400 TABLET, FILM COATED ORAL
Refills: 0 | Status: DISCONTINUED | OUTPATIENT
Start: 2024-06-05 | End: 2024-06-07

## 2024-06-05 RX ADMIN — Medication 2 MILLIGRAM(S): at 06:40

## 2024-06-05 RX ADMIN — DEXMEDETOMIDINE HYDROCHLORIDE IN 0.9% SODIUM CHLORIDE 9.94 MICROGRAM(S)/KG/HR: 4 INJECTION INTRAVENOUS at 06:05

## 2024-06-05 RX ADMIN — Medication 1 PATCH: at 11:47

## 2024-06-05 RX ADMIN — PANTOPRAZOLE SODIUM 40 MILLIGRAM(S): 20 TABLET, DELAYED RELEASE ORAL at 06:09

## 2024-06-05 RX ADMIN — Medication 1 MILLIGRAM(S): at 11:47

## 2024-06-05 RX ADMIN — Medication 3 MILLIGRAM(S): at 02:49

## 2024-06-05 RX ADMIN — Medication 1 PATCH: at 08:10

## 2024-06-05 RX ADMIN — Medication 4 MILLIGRAM(S): at 11:46

## 2024-06-05 RX ADMIN — FLUCONAZOLE 100 MILLIGRAM(S): 150 TABLET ORAL at 09:06

## 2024-06-05 RX ADMIN — Medication 1 MILLIGRAM(S): at 10:22

## 2024-06-05 RX ADMIN — HEPARIN SODIUM 5000 UNIT(S): 5000 INJECTION INTRAVENOUS; SUBCUTANEOUS at 06:09

## 2024-06-05 RX ADMIN — Medication 4 MILLIGRAM(S): at 21:09

## 2024-06-05 RX ADMIN — Medication 1 TABLET(S): at 11:47

## 2024-06-05 RX ADMIN — POLYETHYLENE GLYCOL 3350 17 GRAM(S): 17 POWDER, FOR SOLUTION ORAL at 06:09

## 2024-06-05 RX ADMIN — Medication 4 MILLIGRAM(S): at 18:31

## 2024-06-05 RX ADMIN — MEROPENEM 100 MILLIGRAM(S): 1 INJECTION INTRAVENOUS at 15:39

## 2024-06-05 RX ADMIN — Medication 100 MILLIGRAM(S): at 11:47

## 2024-06-05 RX ADMIN — QUETIAPINE FUMARATE 400 MILLIGRAM(S): 200 TABLET, FILM COATED ORAL at 21:11

## 2024-06-05 RX ADMIN — Medication 255 MILLIMOLE(S): at 11:38

## 2024-06-05 RX ADMIN — MEROPENEM 100 MILLIGRAM(S): 1 INJECTION INTRAVENOUS at 06:07

## 2024-06-05 RX ADMIN — CHLORHEXIDINE GLUCONATE 1 APPLICATION(S): 213 SOLUTION TOPICAL at 06:07

## 2024-06-05 RX ADMIN — Medication 3 MILLIGRAM(S): at 06:12

## 2024-06-05 RX ADMIN — Medication 1 PATCH: at 19:34

## 2024-06-05 RX ADMIN — QUETIAPINE FUMARATE 200 MILLIGRAM(S): 200 TABLET, FILM COATED ORAL at 06:08

## 2024-06-05 RX ADMIN — AMLODIPINE BESYLATE 5 MILLIGRAM(S): 2.5 TABLET ORAL at 06:08

## 2024-06-05 RX ADMIN — HEPARIN SODIUM 5000 UNIT(S): 5000 INJECTION INTRAVENOUS; SUBCUTANEOUS at 18:31

## 2024-06-05 RX ADMIN — Medication 1 PATCH: at 12:06

## 2024-06-05 RX ADMIN — Medication 4 MILLIGRAM(S): at 15:38

## 2024-06-05 RX ADMIN — LACTULOSE 20 GRAM(S): 10 SOLUTION ORAL at 06:15

## 2024-06-05 RX ADMIN — MEROPENEM 100 MILLIGRAM(S): 1 INJECTION INTRAVENOUS at 21:09

## 2024-06-05 NOTE — PROGRESS NOTE ADULT - ASSESSMENT
38M PMH HTN, ETOH use (daily drink) PSH gastric sleeve, initially admitted to [facility] for jaundice and confusion, found to have elevated transaminases to 4000s with high MELD and withdrawal symptoms, transferred to [facility] for liver transplant workup.     #Acute liver failure in the setting of decompensated alcoholic cirrhosis  - Calculated MELD 3 score on 6/4 - 25. Presented w/ elevated liver enzymes, predominantly hepatocellular pattern, now AST/ALT trending down, w/ bili rising. INR has been slowly trending down. Ammonia level stable at 62.   - hepatitis B surface antibody, hepatitis B surface antigen, hepatitis core antibody, hepatitis C NR   - EV: No prior EGD.   - CT chest/ A/P with diffuse, marked hepatic steatosis   - CT head with no acute intracranial hemorrhage, mass effect or midline shift.  - MERLY negative, anti-mitochondrial antibody <1:20, anti-smooth muscle antibody <1:20, immunoglobulins (IgG 1022, IgM 122, IgA quantitative 502) for autoimmune etiologies. Utox neg.   - infectious work up neg - CTX d/c on 6/1.     Recommendations:   - repeat infectious workup   - broaden abx to fluconazole and meropenem   - benzodiazepines for alcohol withdrawal   - Continue with lactulose and rifaximin for HE, goal 3-5 BMs per day.   - Please obtain daily lactate and ammonia levels.   - Daily MELD labs.     All recommendations are tentative until note is attested by attending.     Yesi Salazar MD  Gastroenterology/Hepatology Fellow, PGY-4  Please contact via TEAMS    NON-URGENT CONSULTS:  Please email [email or web address] OR  [email or web address]   38M PMH HTN, ETOH use (daily drink) PSH gastric sleeve, initially admitted to [facility] for jaundice and confusion, found to have elevated transaminases to 4000s with high MELD and withdrawal symptoms, transferred to [facility] for liver transplant workup.     #Acute liver failure in the setting of decompensated alcoholic cirrhosis  - Calculated MELD 3 score on 6/4 - 25. Presented w/ elevated liver enzymes, predominantly hepatocellular pattern, now AST/ALT trending down, w/ bili rising. INR has been slowly trending down. Ammonia level stable at 62.   - hepatitis B surface antibody, hepatitis B surface antigen, hepatitis core antibody, hepatitis C NR   - EV: No prior EGD.   - CT chest/ A/P with diffuse, marked hepatic steatosis   - CT head with no acute intracranial hemorrhage, mass effect or midline shift.  - MERLY negative, anti-mitochondrial antibody <1:20, anti-smooth muscle antibody <1:20, immunoglobulins (IgG 1022, IgM 122, IgA quantitative 502) for autoimmune etiologies. Utox neg.   - infectious work up neg - CTX d/c on 6/1.     Recommendations:   - f/u infectious workup   - c/w broadened abx---> fluconazole and meropenem   - benzodiazepines for alcohol withdrawal   - Continue with lactulose/ miralax/ rifaximin for HE, goal 3-5 BMs per day.   - Please obtain daily lactate and ammonia levels.   - Daily MELD labs.     All recommendations are tentative until note is attested by attending.     Yesi Salazar MD  Gastroenterology/Hepatology Fellow, PGY-4  Please contact via TEAMS    NON-URGENT CONSULTS:  Please email [email or web address]  [email or web address]

## 2024-06-05 NOTE — PROGRESS NOTE ADULT - ASSESSMENT
38M hx HTN, ETOH use (daily drink) PSH gastric sleeve, initially admitted to [facility] for jaundice and confusion, BIBEMS as pt was wondering the streets. Noted to have significantly elevated LFTs (4000s), MELD 47, high CIWA, transfer for liver evaluation iso acute alcoholic hepatitis.    Plan:  NEURO  - AO X3  - Nicotine patch  - Sedation: Precedex prn for agitation  - Seroquel 200 bid for agitation  - Lorazepam PO 3mg q4 standing, 2mg q2 for breakthrough agitation  - Trend ammonia  - Lactulose and rifaximin for hepatic encephalopathy   - Monitor for signs of withdrawal    RESP  - Saturating well on room air  - Maintain O2 saturation >92%    CV  - MAP goal >65  - Pressors: None  - Amlodipine 5qd   - 5/29 TTE wnl    GI/NUTRITION  - Diet: Reg  - Protonix PO qd   - Lactulose    RENAL/  - Monitor Corbett output    HEME  - DVT ppx: SQH    ID  - ABX: s/p CTX, now on Zosyn and rifaximin. Changed 6/4 zosyn to maria eugenia i/s/o unknown tachycardia adn persistant WBC   - 5/29: BCx - NGTD  - 5/29: +UA, UCx negative    ENDO  - Monitor blood glucose   - MISS    LINES  - PIVs    Code: FULL    Dispo: SICU

## 2024-06-05 NOTE — PROGRESS NOTE ADULT - ASSESSMENT
38M PMH HTN, ETOH use (daily drink) PSH gastric sleeve, initially admitted to [facility] for jaundice and confusion, found to have elevated transaminases to 4000s with high MELD and withdrawal symptoms, transferred to [facility] for liver transplant workup.     MRSA/MSSA nasal PCR (May 29) negative  Urine culture (May 29) no growth  Blood cultures (May 29) no growth to date    CT neck (May 29) Enlarged palatine tonsils without hyperemia or surrounding infiltrative changes.  CT chest (May 29) no focal consolidation  CT abdomen pelvis (May 29) Diffuse, marked hepatic steatosis, Question of mild gallbladder wall thickening    #Acute liver injury, transaminitis  Hepatitis A IgM (May 29) negative  Hepatitis B DNA by PCR (May 29) negative  Hepatitis C RNA (May 29) negative  CMV by PCR (May 29) negative  HSV 1/2 serum PCR Negative  Adenovirus serum PCR was inconclusive  Varicella DNA PCR negative  HSV PCR 1/2 is negative  Negative HIV viral load      #Preliver transplant evaluation  COVID19 Ortiz Antibody positive  COVID19 Nucleocapsid Antibody positive  HAV IgG positive  HBVs Ab Negative, HBVs Ag Negative, HBVc Ab negative, HBV PCR Negative  HCV Ab negative, HCV PCR Negative  HSV 1 IgG positive  HSV 2 IgG Equivocal  EBV IgG positive  CMV IgG positive  VZV IgG positive  Measles IgG negative, Mumps IgG negative, Rubella Equivocal  HIV Ag/Ab by CMIA negative  Toxoplasma IgG positive  Strongyloides Ab negative  Coccidioides antibody negative  Quantiferon indeterminate - T spot pending  --No absolute ID contraindication for OLT listing  -- Follow-up on Trypanosoma cruzi Ab      #Leukocytosis  s/p course of ceftriaxone 5/29-6/1 and Zosyn 6/2-6/4  - continue meropenem and fluconazole for now  - f/u repeat blood cultures from 6/4  - would send GI-PCR and stool for C.diff testing in setting of diarrhea  - RUQ US pending to r/o evolving gallbladder pathology as seen on recent CT a/p      #Encounter to Vaccinate Patient - Will defer vaccinations until further clinical stability given acuity of presentation  COVID19: Would benefit from COVID19 [id number] Vaccine Dose  Influenza: Will require with next season  Pneumococcal: Would benefit from PCV20  HAV: Immune, would not require further vaccination  HBV: Would benefit from Heplisav vaccination  MMR: Not mumps or rubeola immune, ideally would require dose of MMR but this would be a live vaccine  Varicella: Immune, would not require further vaccination  Shingles: Will require Shingrix  Tdap: Will require Tdap 38M PMH HTN, ETOH use (daily drink) PSH gastric sleeve, initially admitted to [facility] for jaundice and confusion, found to have elevated transaminases to 4000s with high MELD and withdrawal symptoms, transferred to [facility] for liver transplant workup.     MRSA/MSSA nasal PCR (May 29) negative  Urine culture (May 29) no growth  Blood cultures (May 29) no growth to date    CT neck (May 29) Enlarged palatine tonsils without hyperemia or surrounding infiltrative changes.  CT chest (May 29) no focal consolidation  CT abdomen pelvis (May 29) Diffuse, marked hepatic steatosis, Question of mild gallbladder wall thickening  RUQ US (6/5): no cholecystitis    #Acute liver injury, transaminitis  Hepatitis A IgM (May 29) negative  Hepatitis B DNA by PCR (May 29) negative  Hepatitis C RNA (May 29) negative  CMV by PCR (May 29) negative  HSV 1/2 serum PCR Negative  Adenovirus serum PCR was inconclusive  Varicella DNA PCR negative  HSV PCR 1/2 is negative  Negative HIV viral load      #Preliver transplant evaluation  COVID19 Ortiz Antibody positive  COVID19 Nucleocapsid Antibody positive  HAV IgG positive  HBVs Ab Negative, HBVs Ag Negative, HBVc Ab negative, HBV PCR Negative  HCV Ab negative, HCV PCR Negative  HSV 1 IgG positive  HSV 2 IgG Equivocal  EBV IgG positive  CMV IgG positive  VZV IgG positive  Measles IgG negative, Mumps IgG negative, Rubella Equivocal  HIV Ag/Ab by CMIA negative  Toxoplasma IgG positive  Strongyloides Ab negative  Coccidioides antibody negative  Quantiferon indeterminate - T spot pending  --No absolute ID contraindication for OLT listing  -- Follow-up on Trypanosoma cruzi Ab      #Leukocytosis  s/p course of ceftriaxone 5/29-6/1 and Zosyn 6/2-6/4  - can continue meropenem and fluconazole for now, anticipate de-escalation in the near term  - f/u repeat blood cultures from 6/4  - would send GI-PCR and stool for C.diff testing in setting of diarrhea      #Encounter to Vaccinate Patient - Will defer vaccinations until further clinical stability given acuity of presentation  COVID19: Would benefit from COVID19 [id number] Vaccine Dose  Influenza: Will require with next season  Pneumococcal: Would benefit from PCV20  HAV: Immune, would not require further vaccination  HBV: Would benefit from Heplisav vaccination  MMR: Not mumps or rubeola immune, ideally would require dose of MMR but this would be a live vaccine  Varicella: Immune, would not require further vaccination  Shingles: Will require Shingrix  Tdap: Will require Tdap    Nicanor Castaneda, PGY4  ID Fellow  Pam Teams Preferred  After 5pm/weekends call [phone]

## 2024-06-05 NOTE — PROGRESS NOTE ADULT - ASSESSMENT
38M PMH HTN, ETOH use (daily drink) PSH gastric sleeve, initially admitted to [facility] for jaundice and confusion, found to have elevated transaminases to 4000s with high MELD and withdrawal symptoms, transferred to [facility] for liver transplant workup.     [] Acute alc hep  [] ETOH Cirrhosis   - LFTs trending down, stop NAC gtt  - Delirium: ?Alcohol withdrawal: on seroquel 200mg bid,   - HE: cont lactulose, rifaximin  - Rising WBC: on Layne/Fluc   - BID lab work   - Regular diet   - Ongoing liver transplant eval   - possible MICU transfer

## 2024-06-05 NOTE — PROGRESS NOTE ADULT - SUBJECTIVE AND OBJECTIVE BOX
Interval events:  - Resumed precedex for agitation    NEURO  RASS (if intubated): 		CAM ICU (if concern for delirium):  Exam: AO X3  Meds: dexMEDEtomidine Infusion 0.5 MICROgram(s)/kG/Hr IV Continuous <Continuous>  LORazepam     Tablet 3 milliGRAM(s) Oral every 4 hours  LORazepam   Injectable 2 milliGRAM(s) IV Push every 2 hours PRN Agitation  LORazepam   Injectable 2 milliGRAM(s) IV Push every 1 hour PRN CIWA-Ar score 8 or greater  QUEtiapine 200 milliGRAM(s) Oral every 12 hours      RESPIRATORY  RR: 27 (06-05-24 @ 03:00) (14 - 38)  SpO2: 100% (06-05-24 @ 03:00) (92% - 100%)  Wt(kg): --  Exam: nonlabored respirations on room air  Mechanical Ventilation:     Meds:     CARDIOVASCULAR  HR: 75 (06-05-24 @ 03:00) (71 - 124)  BP: 120/78 (06-05-24 @ 03:00) (97/58 - 162/65)  BP(mean): 95 (06-05-24 @ 03:00) (72 - 99)  ABP: --  ABP(mean): --  Wt(kg): --  CVP(cm H2O): --  VBG - ( 04 Jun 2024 01:39 )  pH: 7.40  /  pCO2: 34    /  pO2: 44    / HCO3: 21    / Base Excess: -3.0  /  SaO2: 70.9   Lactate: 1.3                Exam: pulse present  Meds: amLODIPine   Tablet 5 milliGRAM(s) Oral daily      GI/NUTRITION  Exam: soft, nontender  Diet: regular  Meds: lactulose Syrup 20 Gram(s) Oral every 4 hours  pantoprazole    Tablet 40 milliGRAM(s) Oral before breakfast  polyethylene glycol 3350 17 Gram(s) Oral every 12 hours      GENITOURINARY  I&O's Detail    06-03 @ 07:01  -  06-04 @ 07:00  --------------------------------------------------------  IN:    Dexmedetomidine: 142.3 mL    IV PiggyBack: 225 mL    IV PiggyBack: 1166.3 mL    IV PiggyBack: 1058.4 mL    Lactated Ringers Bolus: 500 mL    Oral Fluid: 1546 mL  Total IN: 4638 mL    OUT:    Indwelling Catheter - Urethral (mL): 2350 mL  Total OUT: 2350 mL    Total NET: 2288 mL      06-04 @ 07:01  -  06-05 @ 04:43  --------------------------------------------------------  IN:    Dexmedetomidine: 19.8 mL    Dexmedetomidine: 91.2 mL    IV PiggyBack: 300 mL  Total IN: 411 mL    OUT:    Indwelling Catheter - Urethral (mL): 1860 mL  Total OUT: 1860 mL    Total NET: -1449 mL          06-04    139  |  108  |  10  ----------------------------<  90  4.1   |  18<L>  |  0.75    Ca    8.9      04 Jun 2024 18:54  Phos  2.6     06-04  Mg     2.4     06-04    TPro  6.4  /  Alb  3.1<L>  /  TBili  18.5<H>  /  DBili  x   /  AST  128<H>  /  ALT  349<H>  /  AlkPhos  269<H>  06-04    Meds: folic acid 1 milliGRAM(s) Oral daily  multivitamin 1 Tablet(s) Oral daily  thiamine 100 milliGRAM(s) Oral daily      HEMATOLOGIC  Meds: heparin   Injectable 5000 Unit(s) SubCutaneous every 12 hours                          12.8   20.94 )-----------( 189      ( 04 Jun 2024 01:46 )             35.7     PT/INR - ( 04 Jun 2024 01:46 )   PT: 17.4 sec;   INR: 1.60 ratio         PTT - ( 04 Jun 2024 01:46 )  PTT:33.8 sec    INFECTIOUS DISEASES  T(C): 36.2 (06-05-24 @ 00:00), Max: 36.7 (06-04-24 @ 19:00)  Wt(kg): --    Recent Cultures:  Specimen Source: .Blood Blood, 06-02 @ 09:30; Results   No growth at 48 Hours; Gram Stain: --; Organism: --  Specimen Source: .Blood Blood, 06-02 @ 09:15; Results   No growth at 48 Hours; Gram Stain: --; Organism: --  Specimen Source: Clean Catch Clean Catch (Midstream), 05-29 @ 17:16; Results   <10,000 CFU/mL Normal Urogenital Aviva; Gram Stain: --; Organism: --  Specimen Source: .Blood Blood-Peripheral, 05-29 @ 13:40; Results   No growth at 5 days; Gram Stain: --; Organism: --  Specimen Source: .Blood Blood-Peripheral, 05-29 @ 13:30; Results   No growth at 5 days; Gram Stain: --; Organism: --  Specimen Source: .Blood Blood, 05-29 @ 12:00; Results   No growth at 5 days; Gram Stain: --; Organism: --  Specimen Source: .Blood Blood, 05-29 @ 11:50; Results   No growth at 5 days; Gram Stain: --; Organism: --    Meds: fluconAZOLE IVPB      fluconAZOLE IVPB 400 milliGRAM(s) IV Intermittent every 24 hours  meropenem  IVPB      meropenem  IVPB 1000 milliGRAM(s) IV Intermittent every 8 hours  rifAXIMin 550 milliGRAM(s) Oral two times a day      ENDOCRINE  Capillary Blood Glucose    Meds: insulin lispro (ADMELOG) corrective regimen sliding scale   SubCutaneous at bedtime  insulin lispro (ADMELOG) corrective regimen sliding scale   SubCutaneous three times a day before meals        OTHER MEDICATIONS:  chlorhexidine 2% Cloths 1 Application(s) Topical <User Schedule>  nicotine - 21 mG/24Hr(s) Patch 1 Patch Transdermal daily      IMAGING:

## 2024-06-05 NOTE — PROGRESS NOTE ADULT - SUBJECTIVE AND OBJECTIVE BOX
Transplant Surgery - Multidisciplinary Rounds  --------------------------------------------------------------  Present:   Patient seen and examined with multidisciplinary Transplant team including Surgeon: Dr Dagher, Hepatologist: Dr Beth,  Noa Steen, and beside RN during AM rounds. Disciplines not in attendance will be notified of the plan.     HPI: 38M PMH HTN, ETOH use (daily drink) PSH gastric sleeve, initially admitted to [facility] for jaundice and confusion, found to have elevated transaminases to 4000s with high MELD and withdrawal symptoms, transferred to [facility] for liver transplant workup.     Interval Events:   -increase seroquel to 200 BID  - d.c precedix gtt  - d/c nac gtt  - d/c zosyn, transitioned to maria eugenia/fluc  - haldol x1 for agitation       Potential Discharge date: TBD  Education:  Medications  Plan of care:  See Below      MEDICATIONS  (STANDING):  amLODIPine   Tablet 5 milliGRAM(s) Oral daily  chlorhexidine 2% Cloths 1 Application(s) Topical <User Schedule>  fluconAZOLE IVPB 400 milliGRAM(s) IV Intermittent every 24 hours  fluconAZOLE IVPB      folic acid 1 milliGRAM(s) Oral daily  heparin   Injectable 5000 Unit(s) SubCutaneous every 12 hours  insulin lispro (ADMELOG) corrective regimen sliding scale   SubCutaneous three times a day before meals  insulin lispro (ADMELOG) corrective regimen sliding scale   SubCutaneous at bedtime  LORazepam     Tablet 4 milliGRAM(s) Oral every 4 hours  meropenem  IVPB      meropenem  IVPB 1000 milliGRAM(s) IV Intermittent every 8 hours  multivitamin 1 Tablet(s) Oral daily  nicotine - 21 mG/24Hr(s) Patch 1 Patch Transdermal daily  pantoprazole    Tablet 40 milliGRAM(s) Oral before breakfast  QUEtiapine 200 milliGRAM(s) Oral <User Schedule>  QUEtiapine 400 milliGRAM(s) Oral <User Schedule>  rifAXIMin 550 milliGRAM(s) Oral two times a day  thiamine 100 milliGRAM(s) Oral daily    MEDICATIONS  (PRN):  LORazepam   Injectable 2 milliGRAM(s) IV Push every 2 hours PRN Agitation      PAST MEDICAL & SURGICAL HISTORY:  Hypertension, unspecified type      H/O gastric sleeve          Vital Signs Last 24 Hrs  T(C): 36.5 (05 Jun 2024 11:00), Max: 36.7 (04 Jun 2024 19:00)  T(F): 97.7 (05 Jun 2024 11:00), Max: 98 (04 Jun 2024 19:00)  HR: 109 (05 Jun 2024 13:00) (71 - 109)  BP: 98/57 (05 Jun 2024 13:00) (97/58 - 162/65)  BP(mean): 73 (05 Jun 2024 13:00) (72 - 99)  RR: 25 (05 Jun 2024 13:00) (13 - 36)  SpO2: 96% (05 Jun 2024 13:00) (95% - 100%)    Parameters below as of 05 Jun 2024 07:00  Patient On (Oxygen Delivery Method): room air        I&O's Summary    04 Jun 2024 07:01  -  05 Jun 2024 07:00  --------------------------------------------------------  IN: 846.5 mL / OUT: 2385 mL / NET: -1538.5 mL    05 Jun 2024 07:01  -  05 Jun 2024 13:36  --------------------------------------------------------  IN: 1150 mL / OUT: 330 mL / NET: 820 mL                              13.1   14.67 )-----------( 180      ( 05 Jun 2024 06:43 )             38.4     06-05    139  |  106  |  9   ----------------------------<  79  4.4   |  19<L>  |  0.60    Ca    9.2      05 Jun 2024 06:43  Phos  2.5     06-05  Mg     2.3     06-05    TPro  6.9  /  Alb  3.0<L>  /  TBili  19.2<H>  /  DBili  x   /  AST  119<H>  /  ALT  323<H>  /  AlkPhos  281<H>  06-05          Culture - Blood (collected 06-02-24 @ 09:30)  Source: .Blood Blood  Preliminary Report (06-05-24 @ 13:01):    No growth at 72 Hours    Culture - Blood (collected 06-02-24 @ 09:15)  Source: .Blood Blood  Preliminary Report (06-05-24 @ 13:01):    No growth at 72 Hours    Culture - Urine (collected 05-29-24 @ 17:16)  Source: Clean Catch Clean Catch (Midstream)  Final Report (05-30-24 @ 14:02):    <10,000 CFU/mL Normal Urogenital Aviva    Culture - Blood (collected 05-29-24 @ 13:40)  Source: .Blood Blood-Peripheral  Final Report (06-03-24 @ 18:00):    No growth at 5 days                Review of systems  unable to obtain, AMS      PHYSICAL EXAM:  GENERAL: confused  HEENT:  NCAT, + scleral icterus  CHEST: no resp distress  HEART:  RRR  ABDOMEN:  Soft, non-tender, non-distended, no masses  EXTREMITIES:  No cyanosis, clubbing, or edema  SKIN:  No rash/erythema/ecchymoses/petechiae/wounds/abscess/warm/dry  NEURO:  Alert and oriented x 1

## 2024-06-05 NOTE — PROGRESS NOTE ADULT - SUBJECTIVE AND OBJECTIVE BOX
Follow Up:      Interval History/ROS: No acute events. Confused at bedside but cooperative. Continues to have watery diarrhea, no abdominal pain. Afebrile, downtrending WBC. Repeat blood cultures from 6/4 pending. T spot pending.    Allergies  No Known Allergies        ANTIMICROBIALS:  fluconAZOLE IVPB    fluconAZOLE IVPB 400 every 24 hours  meropenem  IVPB 1000 every 8 hours  meropenem  IVPB    rifAXIMin 550 two times a day      OTHER MEDS:  MEDICATIONS  (STANDING):  amLODIPine   Tablet 5 daily  heparin   Injectable 5000 every 12 hours  insulin lispro (ADMELOG) corrective regimen sliding scale  three times a day before meals  insulin lispro (ADMELOG) corrective regimen sliding scale  at bedtime  LORazepam     Tablet 4 every 4 hours  LORazepam   Injectable 2 every 2 hours PRN  pantoprazole    Tablet 40 before breakfast  QUEtiapine 200 <User Schedule>  QUEtiapine 400 <User Schedule>      Vital Signs Last 24 Hrs  T(C): 36.2 (05 Jun 2024 07:00), Max: 36.7 (04 Jun 2024 19:00)  T(F): 97.2 (05 Jun 2024 07:00), Max: 98 (04 Jun 2024 19:00)  HR: 104 (05 Jun 2024 10:00) (71 - 110)  BP: 102/68 (05 Jun 2024 10:00) (97/58 - 162/65)  BP(mean): 81 (05 Jun 2024 10:00) (72 - 99)  RR: 24 (05 Jun 2024 10:00) (13 - 38)  SpO2: 97% (05 Jun 2024 10:00) (95% - 100%)    Parameters below as of 05 Jun 2024 07:00  Patient On (Oxygen Delivery Method): room air        PHYSICAL EXAM:  Constitutional:no acute distress  Eyes:TEO, EOMI  Ear/Nose/Throat: no oral lesions, 	  Respiratory: clear BL  Cardiovascular: S1S2  Gastrointestinal:soft, (+) BS, no tenderness  Extremities:no e/e/c  No Lymphadenopathy  IV sites not inflammed.                                13.1   14.67 )-----------( 180      ( 05 Jun 2024 06:43 )             38.4       06-05    139  |  106  |  9   ----------------------------<  79  4.4   |  19<L>  |  0.60    Ca    9.2      05 Jun 2024 06:43  Phos  2.5     06-05  Mg     2.3     06-05    TPro  6.9  /  Alb  3.0<L>  /  TBili  19.2<H>  /  DBili  x   /  AST  119<H>  /  ALT  323<H>  /  AlkPhos  281<H>  06-05      Urinalysis Basic - ( 05 Jun 2024 06:43 )    Color: x / Appearance: x / SG: x / pH: x  Gluc: 79 mg/dL / Ketone: x  / Bili: x / Urobili: x   Blood: x / Protein: x / Nitrite: x   Leuk Esterase: x / RBC: x / WBC x   Sq Epi: x / Non Sq Epi: x / Bacteria: x        MICROBIOLOGY:  v  .Blood Blood  06-02-24   No growth at 48 Hours  --  --      .Blood Blood  06-02-24   No growth at 48 Hours  --  --      Clean Catch Clean Catch (Midstream)  05-29-24   <10,000 CFU/mL Normal Urogenital Aviva  --  --      .Blood Blood-Peripheral  05-29-24   No growth at 5 days  --  --      .Blood Blood-Peripheral  05-29-24   No growth at 5 days  --  --      .Blood Blood  05-29-24   No growth at 5 days  --  --      .Blood Blood  05-29-24   No growth at 5 days  --  --        HIV-1 RNA Quantitative, Viral Load Log: NOT DET. lg /mL (06-01-24 @ 07:30)  CMV IgG Antibody: 4.50 U/mL (05-29-24 @ 17:13)  Toxoplasma IgG Screen: 48.5 IU/mL (05-29-24 @ 17:13)    CMVPCR Log: NotDetec Tka38AU/mL (05-29 @ 17:13)        RADIOLOGY:

## 2024-06-06 ENCOUNTER — NON-APPOINTMENT (OUTPATIENT)
Age: 39
End: 2024-06-06

## 2024-06-06 DIAGNOSIS — Z29.9 ENCOUNTER FOR PROPHYLACTIC MEASURES, UNSPECIFIED: ICD-10-CM

## 2024-06-06 DIAGNOSIS — R65.10 SYSTEMIC INFLAMMATORY RESPONSE SYNDROME (SIRS) OF NON-INFECTIOUS ORIGIN WITHOUT ACUTE ORGAN DYSFUNCTION: ICD-10-CM

## 2024-06-06 DIAGNOSIS — K72.00 ACUTE AND SUBACUTE HEPATIC FAILURE WITHOUT COMA: ICD-10-CM

## 2024-06-06 DIAGNOSIS — F10.931 ALCOHOL USE, UNSPECIFIED WITH WITHDRAWAL DELIRIUM: ICD-10-CM

## 2024-06-06 DIAGNOSIS — I10 ESSENTIAL (PRIMARY) HYPERTENSION: ICD-10-CM

## 2024-06-06 LAB
ALBUMIN SERPL ELPH-MCNC: 3 G/DL — LOW (ref 3.3–5)
ALP SERPL-CCNC: 291 U/L — HIGH (ref 40–120)
ALT FLD-CCNC: 251 U/L — HIGH (ref 10–45)
AMMONIA BLD-MCNC: 23 UMOL/L — SIGNIFICANT CHANGE UP (ref 11–55)
ANION GAP SERPL CALC-SCNC: 12 MMOL/L — SIGNIFICANT CHANGE UP (ref 5–17)
APPEARANCE UR: CLEAR — SIGNIFICANT CHANGE UP
APTT BLD: 33.8 SEC — SIGNIFICANT CHANGE UP (ref 24.5–35.6)
AST SERPL-CCNC: 93 U/L — HIGH (ref 10–40)
BACTERIA # UR AUTO: NEGATIVE /HPF — SIGNIFICANT CHANGE UP
BILIRUB SERPL-MCNC: 18.3 MG/DL — HIGH (ref 0.2–1.2)
BILIRUB UR-MCNC: ABNORMAL
BUN SERPL-MCNC: 13 MG/DL — SIGNIFICANT CHANGE UP (ref 7–23)
CALCIUM SERPL-MCNC: 8.6 MG/DL — SIGNIFICANT CHANGE UP (ref 8.4–10.5)
CAST: 4 /LPF — SIGNIFICANT CHANGE UP (ref 0–4)
CHLORIDE SERPL-SCNC: 105 MMOL/L — SIGNIFICANT CHANGE UP (ref 96–108)
CO2 SERPL-SCNC: 18 MMOL/L — LOW (ref 22–31)
COLOR SPEC: SIGNIFICANT CHANGE UP
CREAT SERPL-MCNC: 0.67 MG/DL — SIGNIFICANT CHANGE UP (ref 0.5–1.3)
DIFF PNL FLD: NEGATIVE — SIGNIFICANT CHANGE UP
EGFR: 123 ML/MIN/1.73M2 — SIGNIFICANT CHANGE UP
GLUCOSE BLDC GLUCOMTR-MCNC: 85 MG/DL — SIGNIFICANT CHANGE UP (ref 70–99)
GLUCOSE SERPL-MCNC: 87 MG/DL — SIGNIFICANT CHANGE UP (ref 70–99)
GLUCOSE UR QL: NEGATIVE MG/DL — SIGNIFICANT CHANGE UP
HCT VFR BLD CALC: 34.3 % — LOW (ref 39–50)
HGB BLD-MCNC: 12.4 G/DL — LOW (ref 13–17)
INR BLD: 1.3 RATIO — HIGH (ref 0.85–1.18)
KETONES UR-MCNC: NEGATIVE MG/DL — SIGNIFICANT CHANGE UP
LEUKOCYTE ESTERASE UR-ACNC: ABNORMAL
MAGNESIUM SERPL-MCNC: 2.2 MG/DL — SIGNIFICANT CHANGE UP (ref 1.6–2.6)
MCHC RBC-ENTMCNC: 32.8 PG — SIGNIFICANT CHANGE UP (ref 27–34)
MCHC RBC-ENTMCNC: 36.2 GM/DL — HIGH (ref 32–36)
MCV RBC AUTO: 90.7 FL — SIGNIFICANT CHANGE UP (ref 80–100)
NITRITE UR-MCNC: NEGATIVE — SIGNIFICANT CHANGE UP
NRBC # BLD: 0 /100 WBCS — SIGNIFICANT CHANGE UP (ref 0–0)
PH UR: 5.5 — SIGNIFICANT CHANGE UP (ref 5–8)
PHOSPHATE SERPL-MCNC: 2.2 MG/DL — LOW (ref 2.5–4.5)
PLATELET # BLD AUTO: 137 K/UL — LOW (ref 150–400)
POTASSIUM SERPL-MCNC: 4.2 MMOL/L — SIGNIFICANT CHANGE UP (ref 3.5–5.3)
POTASSIUM SERPL-SCNC: 4.2 MMOL/L — SIGNIFICANT CHANGE UP (ref 3.5–5.3)
PROT SERPL-MCNC: 7 G/DL — SIGNIFICANT CHANGE UP (ref 6–8.3)
PROT UR-MCNC: NEGATIVE MG/DL — SIGNIFICANT CHANGE UP
PROTHROM AB SERPL-ACNC: 14.2 SEC — HIGH (ref 9.5–13)
RBC # BLD: 3.78 M/UL — LOW (ref 4.2–5.8)
RBC # FLD: 17.7 % — HIGH (ref 10.3–14.5)
RBC CASTS # UR COMP ASSIST: 6 /HPF — HIGH (ref 0–4)
REVIEW: SIGNIFICANT CHANGE UP
SODIUM SERPL-SCNC: 135 MMOL/L — SIGNIFICANT CHANGE UP (ref 135–145)
SP GR SPEC: 1.02 — SIGNIFICANT CHANGE UP (ref 1–1.03)
SQUAMOUS # UR AUTO: 1 /HPF — SIGNIFICANT CHANGE UP (ref 0–5)
T CRUZI AB SER-ACNC: SIGNIFICANT CHANGE UP
UROBILINOGEN FLD QL: 2 MG/DL (ref 0.2–1)
WBC # BLD: 13.3 K/UL — HIGH (ref 3.8–10.5)
WBC # FLD AUTO: 13.3 K/UL — HIGH (ref 3.8–10.5)
WBC UR QL: 1 /HPF — SIGNIFICANT CHANGE UP (ref 0–5)

## 2024-06-06 PROCEDURE — 99232 SBSQ HOSP IP/OBS MODERATE 35: CPT | Mod: GC

## 2024-06-06 PROCEDURE — 99223 1ST HOSP IP/OBS HIGH 75: CPT

## 2024-06-06 PROCEDURE — 99232 SBSQ HOSP IP/OBS MODERATE 35: CPT

## 2024-06-06 RX ADMIN — QUETIAPINE FUMARATE 200 MILLIGRAM(S): 200 TABLET, FILM COATED ORAL at 08:59

## 2024-06-06 RX ADMIN — CHLORHEXIDINE GLUCONATE 1 APPLICATION(S): 213 SOLUTION TOPICAL at 05:22

## 2024-06-06 RX ADMIN — HEPARIN SODIUM 5000 UNIT(S): 5000 INJECTION INTRAVENOUS; SUBCUTANEOUS at 05:23

## 2024-06-06 RX ADMIN — Medication 1 PATCH: at 11:18

## 2024-06-06 RX ADMIN — Medication 1 PATCH: at 11:40

## 2024-06-06 RX ADMIN — Medication 4 MILLIGRAM(S): at 10:37

## 2024-06-06 RX ADMIN — QUETIAPINE FUMARATE 400 MILLIGRAM(S): 200 TABLET, FILM COATED ORAL at 20:25

## 2024-06-06 RX ADMIN — Medication 1 PATCH: at 19:36

## 2024-06-06 RX ADMIN — Medication 4 MILLIGRAM(S): at 02:10

## 2024-06-06 RX ADMIN — Medication 1 PATCH: at 07:02

## 2024-06-06 RX ADMIN — Medication 4 MILLIGRAM(S): at 14:28

## 2024-06-06 RX ADMIN — Medication 4 MILLIGRAM(S): at 05:23

## 2024-06-06 RX ADMIN — Medication 1 MILLIGRAM(S): at 11:40

## 2024-06-06 RX ADMIN — Medication 85 MILLIMOLE(S): at 02:53

## 2024-06-06 RX ADMIN — MEROPENEM 100 MILLIGRAM(S): 1 INJECTION INTRAVENOUS at 14:28

## 2024-06-06 RX ADMIN — MEROPENEM 100 MILLIGRAM(S): 1 INJECTION INTRAVENOUS at 05:23

## 2024-06-06 RX ADMIN — Medication 100 MILLIGRAM(S): at 11:40

## 2024-06-06 RX ADMIN — FLUCONAZOLE 100 MILLIGRAM(S): 150 TABLET ORAL at 09:38

## 2024-06-06 RX ADMIN — HEPARIN SODIUM 5000 UNIT(S): 5000 INJECTION INTRAVENOUS; SUBCUTANEOUS at 18:07

## 2024-06-06 RX ADMIN — AMLODIPINE BESYLATE 5 MILLIGRAM(S): 2.5 TABLET ORAL at 05:23

## 2024-06-06 RX ADMIN — Medication 1 TABLET(S): at 11:40

## 2024-06-06 RX ADMIN — PANTOPRAZOLE SODIUM 40 MILLIGRAM(S): 20 TABLET, DELAYED RELEASE ORAL at 05:22

## 2024-06-06 RX ADMIN — MEROPENEM 100 MILLIGRAM(S): 1 INJECTION INTRAVENOUS at 22:57

## 2024-06-06 NOTE — PROGRESS NOTE ADULT - ASSESSMENT
38yoM hx HTN, ETOH use (daily drinker), PSH gastric sleeve, presenting initially for jaundice and confusion, transferred from [facility] to [facility] SICU for liver transplant evaluation. Now s/p NAC gtt with evidence of hepatic recovery so liver transplantation evaluation currently on hold. Course complicated by delirium possibly due to alcohol withdrawal syndrome. Now transferred to medicine for further management.

## 2024-06-06 NOTE — PROGRESS NOTE ADULT - ATTENDING COMMENTS
37 yo M with history of obesity (s/p gastric bypass surgery and now with BMI within normal range) and AUD, transferred from [facility] to [facility] and admitted to SICU on 5/29/24 due to alcohol withdrawal syndrome with delirium and acute liver failure (USP) with a severe acute hepatocellular injury likely due to acetaminophen hepatotoxicity, with peak INR 3.8 and peak AST and ALT of 4845 and 4619 U/L, now on NAC (5/29- ). The acute liver injury has at least partially improved with decreasing liver enzymes, normalized lactate, and INR down to 2 today, though he is having a delayed rise in hyperbilirubinemia as is typical after a severe hepatocellular injury. He continues to have waxing and waning agitated delirium related to alcohol withdrawal versus hepatic encephalopathy and that is not well-controlled with Precedex infusion. Recommend escalating Ativan doses and frequency if needed as per CIWA protocol. Recommend starting rifaximin. Continue lactulose (though has refused some of the doses); can add Miralax if this would be more palatable to him. Given worsening leukocytosis (though may be non-infectious), repeat infectious work-up sent today. CXR (6/2) with no obvious pneumonia. No ascites on prior CT (5/29) but would reassess with bedside ultrasound for possible diagnostic paracentesis if ascites has developed in the interim. S/p ceftriaxone (5/29-6/1) and broadened to Zosyn today (6/2- ) pending repeat cultures and given potential risk of recent aspiration. Continue continuous NAC infusion until INR <1.5 and ALT <1000. Continue to trend labs daily including ammonia and lactate levels. ABO O with current MELD 3.0 score of 25. He has an open evaluation for liver transplantation.    Please don't hesitate to call with any questions or concerns.    Mojgan Beth M.D., Ph.D.  Transplant Hepatology
SICU ATTENDING ATTESTATION    I have seen and examined this patient on multidisciplinary SICU rounds thismorning. I have reviewed all new labs, imaging and reports. I have participated in formulating the plan for the day, and have read and agree with the history, ROS, exam, assessment and plan as stated above, with my additions listed below:     Slightly improved agitation. Unclear if this still withdrawal (scoring on CIWA) or delirium now (may be mixed.)  No improvement with one time dose of Haldol yesterday.   Patient remains impulsive and tries to climb out of bed. Though is usually redirectable, and concentrates on eating with meals (able to feed himself).   Will d/c precedex gtt today  (angélica required for several hours yesterday evening), increase seroquel to 200mg AM / 400mg PM (), and increase Ativan to 4mg PO q4.   intermittently tachycardic --> likely agitation related. BLE duplex negative for dvt. on HSQ for dvt ppx.   regular diet. Off NAC since yesterday. LFT's stable.   WBC improved from 20-->14 today after escalating to Merem and Fluc yesterday. Unclear source of infection. Will get RUQ sono for some GB wall thickening, though unlikely to consider surgery at this time as is improving with abx.   If able to remain off precedex, will be a candidate for de-escalation to the floor tomorrow with 1:1 obs.     Total time spent in the care of this patient today (excluding procedures & teaching): 55 min                 Over 50% of the total time was spent in discussion and coordination of care with consulting services, dietary and rehab services.       Dana Pillai M.D., M.S.  Division of Acute Care Surgery
37 yo M with history of obesity (s/p gastric bypass surgery and now with BMI within normal range) and AUD, transferred from [facility] to [facility] and admitted to SICU on 5/29/24 due to alcohol withdrawal syndrome with delirium and acute liver failure (intermediate) with a severe acute hepatocellular injury likely due to acetaminophen hepatotoxicity, with peak INR 3.8 and peak AST and ALT of 4845 and 4619 U/L, s/p NAC (5/29-6/2) discontinued due to improving acute liver injury. He continues to have waxing and waning delirium with episodes of agitation likely related to alcohol withdrawal versus hepatic encephalopathy. He remains on Precedex infusion as per SICU team and is receiving Ativan PRN. Continuing rifaximin, lactulose, and Miralax. Labs still with worsening leukocytosis but an infectious work-up is negative thus far. S/p ceftriaxone (5/29-6/1) and currently on Zosyn (6/2- ). Continue to trend labs daily including ammonia and lactate levels. ABO O with current MELD 3.0 score of 25. He has an open evaluation for liver transplantation.    Please don't hesitate to call with any questions or concerns.    Mojgan Beth M.D., Ph.D.  Transplant Hepatology
37 yo M with history of obesity (s/p gastric bypass surgery and now with BMI within normal range) and AUD, transferred from [facility] to [facility] and admitted to SICU on 5/29/24 due to alcohol withdrawal syndrome with delirium and acute liver failure (FCI) with a severe acute hepatocellular injury likely due to acetaminophen hepatotoxicity, with peak INR 3.8 and peak AST and ALT of 4845 and 4619 U/L, s/p NAC (5/29-6/4) and with improving acute liver injury. Despite the improved liver injury and no current suspicion for residual hepatic encephalopathy, he continues to have waxing and waning delirium suspected to be alcohol withdrawal syndrome but is now off Precedex since 7am yesterday and being managed with Ativan, Seroquel, and continued 1:1 observation. Although leukocytosis is improving, he was febrile to Tmax 101 yesterday and remains mildly tachycardia in 90s-100s. Infectious work-up negative thus far. S/p ceftriaxone (5/29-6/1), s/p Zosyn (6/2-4), and currently on meropenem (6/4- ) and fluconazole (6/4- ). Continue to trend labs daily. ABO O with current MELD 3.0 score of 23. He has an open evaluation for liver transplantation but we are holding off on completing it for now evidence of hepatic recovery. No hepatic contraindication to his being transferred out of ICU to Medicine with Hepatology to then follow as a consulting service.    Please don't hesitate to call with any questions or concerns.    Mojgan Beth M.D., Ph.D.  Transplant Hepatology
37 yo M with history of obesity (s/p gastric bypass surgery and now with BMI within normal range) and AUD, transferred from [facility] to [facility] and admitted to SICU on 5/29/24 due to alcohol withdrawal syndrome with delirium and acute liver failure (shelter) with a severe acute hepatocellular injury likely due to acetaminophen hepatotoxicity, with peak INR 3.8 and peak AST and ALT of 4845 and 4619 U/L, s/p NAC (5/29-6/4) discontinued today due to improving acute liver injury. His mentation was improved this morning, though he waxes and wanes and is still being weaned off Precedex and requiring Seroquel and Ativan PRN (with latter for presumed alcohol withdrawal). Continuing rifaximin, lactulose, and Miralax for possible component of hepatic encephalopathy. Leukocytosis worsening and PCT elevated, but infectious work-up all negative thus far. S/p ceftriaxone (5/29-6/1), s/p Zosyn (6/2-4), and broadened today to meropenem (6/4- ) and fluconazole (6/4- ) empirically. Continue to trend labs daily including ammonia and lactate levels. ABO O with current MELD 3.0 score of 24. He has an open evaluation for liver transplantation but we are holding off on completing it for now given signs of hepatic recovery.    Please don't hesitate to call with any questions or concerns.    Mojgan Beth M.D., Ph.D.  Transplant Hepatology
38M PMH HTN, ETOH use (daily drink) PSH gastric sleeve, initially admitted to [facility] for jaundice and confusion, found to have elevated transaminases to 4000s with high MELD and withdrawal symptoms, transferred to [facility] for liver transplant workup.     MRSA/MSSA nasal PCR (May 29) negative  Urine culture (May 29) no growth  Blood cultures (May 29) no growth to date    CT neck (May 29) Enlarged palatine tonsils without hyperemia or surrounding infiltrative changes.  CT chest (May 29) no focal consolidation  CT abdomen pelvis (May 29) Diffuse, marked hepatic steatosis, Question of mild gallbladder wall thickening    Acute liver injury appears to be improving, making infectious etiology less likely  ?Medicine / Supplement associated given "antibiotic" with component of alcohol abuse    Ideally would wait for further culture data and QuantiFERON gold but given unrevealing CT imaging no absolute ID contraindication to liver transplantation if urgently indicated    Would favor stopping Ceftriaxone as ID workup for bacterial etiologies is unrevealing    I will continue to follow. Please feel free to contact me with any further questions.    Steve Trevizo M.D.  [facility] Division of Infectious Disease  8AM-5PM Monday - Friday: Available on [employer] Teams  After Hours and Holidays (or if no response on [employer] Teams): Please contact the Infectious Diseases Office at [phone]
38M with AUD admitted with acute liver failure, MELD 34  +recent etoh use and tetracycline  +AMS  will initiate transplant workup. f/u cultures and imaging  Liver function improving.  AST and lactate downtrending. cont to monitor  may need liver biopsy if no improvement  Cont abx, NAC  Hepatology following
Patient seen and examined    Case discussed with DR Castaneda    I agree with his interval history exam and plans as noted above    Patient c/o loose stool  would send GI-PCR and stool for C.diff testing    Leukocytosis to 20k range  CT with some gall bladder wall thickening- would consider RUQ sonogram  Continue Meropenem/fluconazole pending repeat cultures  repeat abdominal sonogram to see if safe pocket for paracentesis  follow up serologies for pre-transplant evaluation    Alvarez Camacho MD  Can be called via Teams  After 5pm/weekends [phone]
Patient seen and examined    Case discussed with DR Castaneda    I agree with his interval history exam and plans as noted above    follow up blood cultures  continue Meropenem and fluconazole pending culture results    Alvarez Camacho MD  Can be called via Teams  After 5pm/weekends [phone]
Patient seen and examined    Case discussed with Dr Castaneda    I agree with his interval history exam and plans as noted above    c/o loose stools-  would send GI-PCR and stool for C.diff testing    Alvarez Camacho MD  Can be called via Teams  After 5pm/weekends [phone]
SICU ATTENDING ATTESTATION    I have seen and examined this patient on multidisciplinary SICU rounds thismorning. I have reviewed all new labs, imaging and reports. I have participated in formulating the plan for the day, and have read and agree with the history, ROS, exam, assessment and plan as stated above, with my additions listed below:     Slow but steady improvement in control of agitation / delirium / withdrawal.   Remains on a 1:1 but is more calm during the day.   Started on PO ativan yesterday and required only one dose of additional IV ativan overnight for agitation.   Today will increase PO ativan to 3mg PO q4 hrs.   Increase seroquel to 200mg BID.   Goal to wean off precedex today.   ammonia remains low at 20, taking lactulose sometimes. stopping NAC drip today as INR is now less than 2.   BP well controlled on amlodipine.   Tachy overnight and thismorning to 110-120, but 90's on rounds. BLE duplex pending. on SQH for dvt ppx.   Increased WBC 17-->20 with increased procal to 2. No fevers. IF spikes will pan cx. abx broadened from zosyn to merem and fluc.     The patient required critical care. Critical care time was indicated due to the patient's inherent instability and high risk for decompensation. E & M work was done by me in multiple 10-15 minute intervals over the course of the day in the ICU. I have coordinated care with multiple teams, and reviewed the medical record, consult notes, ICU flow sheets, cardiac monitoring, mechanical ventilation, medication record, brain and body imaging, and serial sequential labs.       Total time spent in the care of this patient today (excluding procedures & teaching): 55 min                 Over 50% of the total time was spent in discussion and coordination of care with consulting services, dietary and rehab services.       Dana Pillai M.D., M.S.  Division of Acute Care Surgery
Severe acute liver injury in the context of taking acetaminophen, drinking alcohol and taking medication provided by non-medical source (as per family)  Improving ALT, AST and INR  Continue NAC until INR below 2   Altered mental status mostly alcohol withdrawal. Check ammonia level on daily basis. He wakes up once lorazepam weans off but becomes agitated)  Transplant work up in progress. I am hoping that his liver recovers  His condition was discussed with his fiance of more than 15 years and father  Broad spectrum IV antibiotics as he was febrile upon arrival
38M with AUD admitted with acute liver failure, MELD 34  +recent etoh use and tetracycline  +AMS  liver function continues to improve. No need for biopsy at this time  f/u cultures  Cont abx, NAC until INR <2  Hepatology following
37 yo M with history of obesity (s/p gastric bypass surgery and now with BMI within normal range) and AUD, transferred from [facility] to [facility] and admitted to SICU on 5/29/24 due to alcohol withdrawal syndrome with delirium and acute liver failure (prison) with a severe acute hepatocellular injury likely due to acetaminophen hepatotoxicity, with peak INR 3.8 and peak AST and ALT of 4845 and 4619 U/L, s/p NAC (5/29-6/4) and with improving acute liver injury. Despite the improved liver injury and no current suspicion for residual hepatic encephalopathy, he continues to have waxing and waning delirium including periods of agitation and other periods of somnolence. While this may still reflect alcohol withdrawal syndrome, recommend Behavioral Health consultation given lack of consistent improvement, still requiring Precedex drip intermittently in addition to Seroquel and Ativan. His leukocytosis has partially improved today after antimicrobial coverage was empirically broadened yesterday but his infectious work-up remains negative thus far. S/p ceftriaxone (5/29-6/1), s/p Zosyn (6/2-4), and currently on meropenem (6/4- ) and fluconazole (6/4- ). Continue to trend labs daily. ABO O with current MELD 3.0 score of 25. He has an open evaluation for liver transplantation but we are holding off on completing it for now given signs of hepatic recovery. We reached out to [facility] team today re: potential transfer to their service given no current plan to pursue liver transplant but they said there is no current bed available for him there.    Please don't hesitate to call with any questions or concerns.    Mojgan Beth M.D., Ph.D.  Transplant Hepatology
Patient seen and examined and agree with above.   38 year old male with cirrhosis and encephalopathy, end stage liver.     Current management includes:  Neuro- pain currently controlled with current regimen; continue precedex at this time.   AAOx 3  Lactulose and will titrate to BM  ammonia is 54  Ativan PRN  CIWA 0  CV- Hemodynamically stable   Pulm - on room air oxygenating well   CXR- normal       -goal SpO2 92%  GI- LFTs improving and with NAC drip may make a full recovery   ID- ceftriaxone for SBP prophylaxis  Endocrine - controlled
37 yo M with history of obesity (s/p gastric bypass surgery and now with BMI within normal range) and AUD, transferred from [facility] to [facility] and admitted to SICU on 5/29/24 due to alcohol withdrawal syndrome with delirium and acute liver failure (FDC) with a severe acute hepatocellular injury likely due to acetaminophen hepatotoxicity, with peak INR 3.8 and peak AST and ALT of 4845 and 4619 U/L, now on NAC (5/29- ) and slowly improving. He has waxing and waning agitated delirium that appears to be more related to alcohol withdrawal than hepatic encephalopathy. Serum ammonia levels have overall trended down from  (5/29) to 54 (5/31) and 75 (6/1). Most recent INR 2.09 (improving) and lactate 1.1. Low phosphorus levels also are a good prognostic sign of hepatic recovery. Infectious work-up negative thus far including urine culture (5/29) negative and blood cultures (5/29 x4) with NGTD. No ascites on CT (5/29). Okay to discontinue ceftriaxone (5/29-6/1) and monitor off antibiotics for now. No current indication for further IV albumin (5/30-6/1) and he is now on an enteral diet. Continue continuous NAC infusion until INR <1.5 and ALT <1000. Continue to trend labs daily including ammonia and lactate levels. Appreciated continued SICU management of his alcohol withdrawal syndrome, currently on Precedex infusion and also Ativan PRN. ABO O with current MELD 3.0 score of 27. He has an open evaluation for liver transplantation.    Please don't hesitate to call with any questions or concerns.    Mojgan Beth M.D., Ph.D.  Transplant Hepatology

## 2024-06-06 NOTE — PROGRESS NOTE ADULT - ASSESSMENT
Plan:  NEURO  - AO X2  - Nicotine patch  - Sedation: none  - Seroquel 200am 400 qhs for agitation  - Lorazepam PO 4mg q4 standing, 2mg q2 for breakthrough agitation  - Trend ammonia  - Rifaximin for hepatic encephalopathy   - Monitor for signs of withdrawal    RESP  - Satting well on room air  - Maintain O2 saturation >92%    CV  - MAP goal >65  - Pressors: None  - Amlodipine 5qd   - 5/29 TTE wnl    GI/NUTRITION  - Diet: Reg  - Protonix PO qd   - Thiamine, folate  - RUQ US 6/5 wnl    RENAL/  - Monitor Corbett output    HEME  - DVT ppx: SQH    ID  - ABX: s/p CTX->Zosyn->meropenem and fluconazole iso tachycardia and persistant WBC   - 5/29: BCx - NGTD  - 5/29: +UA, UCx negative  - 6/4: BCx- NGTD    ENDO  - Monitor blood glucose   - MISS    LINES  - PIVs    Code: FULL    Dispo: SICU

## 2024-06-06 NOTE — PROGRESS NOTE ADULT - ASSESSMENT
38M PMH HTN, ETOH use (daily drink) PSH gastric sleeve, initially admitted to [facility] for jaundice and confusion, found to have elevated transaminases to 4000s with high MELD and withdrawal symptoms, transferred to [facility] for liver transplant workup.     #Acute liver failure in the setting of decompensated alcoholic cirrhosis  -MELD 23 O. Presented w/ elevated liver enzymes, predominantly hepatocellular pattern, now AST/ALT trending down, w/ bili rising. INR has been slowly trending down. Ammonia level stable at 62.   - hepatitis B surface antibody, hepatitis B surface antigen, hepatitis core antibody, hepatitis C NR   - EV: No prior EGD.   - CT chest/ A/P with diffuse, marked hepatic steatosis   - CT head with no acute intracranial hemorrhage, mass effect or midline shift.  - MERLY negative, anti-mitochondrial antibody <1:20, anti-smooth muscle antibody <1:20, immunoglobulins (IgG 1022, IgM 122, IgA quantitative 502) for autoimmune etiologies. Utox neg.     Recommendations:   - c/w fluconazole and meropenem   - f/u BCx   -  recs appreciated   - Continue with lactulose/ miralax/ rifaximin for HE, goal 3-5 BMs per day.   - Please obtain daily lactate and ammonia levels.   - Daily MELD labs.     All recommendations are tentative until note is attested by attending.     Yesi Salazar MD  Gastroenterology/Hepatology Fellow, PGY-4  Please contact via TEAMS    NON-URGENT CONSULTS:  Please email [email or web address]

## 2024-06-06 NOTE — PROGRESS NOTE ADULT - SUBJECTIVE AND OBJECTIVE BOX
Follow Up:      Interval History/ROS: More lethargic today but he ate his breakfast per the PCA at bedside. Opens his eyes intermittently and seemed upset when asked to open them. Febrile overnight to 101, WBC slight downtrend 14 -> 13, new thrombocytopenia, LFTs downtrended, repeat UA no pyuria, repeat blood cultures pending. As per staff no further diarrhea.    Allergies  No Known Allergies        ANTIMICROBIALS:  fluconAZOLE IVPB 400 every 24 hours  fluconAZOLE IVPB    meropenem  IVPB    meropenem  IVPB 1000 every 8 hours  rifAXIMin 550 two times a day      OTHER MEDS:  MEDICATIONS  (STANDING):  amLODIPine   Tablet 5 daily  heparin   Injectable 5000 every 12 hours  LORazepam     Tablet 4 every 4 hours  LORazepam   Injectable 2 every 2 hours PRN  pantoprazole    Tablet 40 before breakfast  QUEtiapine 400 <User Schedule>  QUEtiapine 200 <User Schedule>      Vital Signs Last 24 Hrs  T(C): 37.4 (2024 07:00), Max: 38.3 (2024 23:00)  T(F): 99.4 (2024 07:00), Max: 101 (2024 23:00)  HR: 102 (2024 10:00) (87 - 109)  BP: 109/61 (2024 10:00) (81/46 - 144/73)  BP(mean): 80 (2024 10:00) (60 - 100)  RR: 21 (2024 10:00) (17 - 25)  SpO2: 92% (2024 10:00) (92% - 97%)    Parameters below as of 2024 07:00  Patient On (Oxygen Delivery Method): room air        PHYSICAL EXAM:  Constitutional: lethargic  Eyes:TEO, EOMI  Ear/Nose/Throat: no oral lesions, 	  Respiratory: clear BL  Cardiovascular: S1S2  Gastrointestinal:soft, (+) BS, no tenderness  Extremities:no e/e/c  No Lymphadenopathy  IV sites not inflammed.                                12.4   13.30 )-----------( 137      ( 2024 01:08 )             34.3       06-06    135  |  105  |  13  ----------------------------<  87  4.2   |  18<L>  |  0.67    Ca    8.6      2024 01:07  Phos  2.2     06-  Mg     2.2     -    TPro  7.0  /  Alb  3.0<L>  /  TBili  18.3<H>  /  DBili  x   /  AST  93<H>  /  ALT  251<H>  /  AlkPhos  291<H>  -      Urinalysis Basic - ( 2024 04:17 )    Color: Dark Yellow / Appearance: Clear / S.018 / pH: x  Gluc: x / Ketone: Negative mg/dL  / Bili: Large / Urobili: 2.0 mg/dL   Blood: x / Protein: Negative mg/dL / Nitrite: Negative   Leuk Esterase: Small / RBC: 6 /HPF / WBC 1 /HPF   Sq Epi: x / Non Sq Epi: 1 /HPF / Bacteria: Negative /HPF        MICROBIOLOGY:  v  .Blood Blood-Peripheral  24   No growth at 24 hours  --  --      .Blood Blood-Peripheral  24   No growth at 24 hours  --  --      .Blood Blood  24   No growth at 72 Hours  --  --      .Blood Blood  24   No growth at 72 Hours  --  --      Clean Catch Clean Catch (Midstream)  24   <10,000 CFU/mL Normal Urogenital Aviva  --  --      .Blood Blood-Peripheral  24   No growth at 5 days  --  --      .Blood Blood-Peripheral  24   No growth at 5 days  --  --      .Blood Blood  24   No growth at 5 days  --  --      .Blood Blood  24   No growth at 5 days  --  --        HIV-1 RNA Quantitative, Viral Load Log: NOT DET. lg /mL (24 @ 07:30)  CMV IgG Antibody: 4.50 U/mL (24 @ 17:13)  Toxoplasma IgG Screen: 48.5 IU/mL (24 @ 17:13)          RADIOLOGY:   Follow Up:      Interval History/ROS: More lethargic today but he ate his breakfast per the PCA at bedside. Opens his eyes intermittently and seemed upset when asked to open them. Febrile overnight to 101, WBC slight downtrend 14 -> 13, new thrombocytopenia, LFTs downtrended, repeat UA no pyuria, repeat blood cultures pending. As per staff no further diarrhea.    Allergies  No Known Allergies        ANTIMICROBIALS:  fluconAZOLE IVPB 400 every 24 hours  fluconAZOLE IVPB    meropenem  IVPB    meropenem  IVPB 1000 every 8 hours  rifAXIMin 550 two times a day      OTHER MEDS:  MEDICATIONS  (STANDING):  amLODIPine   Tablet 5 daily  heparin   Injectable 5000 every 12 hours  LORazepam     Tablet 4 every 4 hours  LORazepam   Injectable 2 every 2 hours PRN  pantoprazole    Tablet 40 before breakfast  QUEtiapine 400 <User Schedule>  QUEtiapine 200 <User Schedule>      Vital Signs Last 24 Hrs  T(C): 37.4 (2024 07:00), Max: 38.3 (2024 23:00)  T(F): 99.4 (2024 07:00), Max: 101 (2024 23:00)  HR: 102 (2024 10:00) (87 - 109)  BP: 109/61 (2024 10:00) (81/46 - 144/73)  BP(mean): 80 (2024 10:00) (60 - 100)  RR: 21 (2024 10:00) (17 - 25)  SpO2: 92% (2024 10:00) (92% - 97%)    Parameters below as of 2024 07:00  Patient On (Oxygen Delivery Method): room air        PHYSICAL EXAM:  Constitutional: lethargic  Eyes:TEO, EOMI  Ear/Nose/Throat: no oral lesions, 	  Respiratory: clear BL  Cardiovascular: S1S2  Gastrointestinal:soft, (+) BS, no tenderness  Extremities:no e/e/c  No Lymphadenopathy  IV sites not inflammed.                                12.4   13.30 )-----------( 137      ( 2024 01:08 )             34.3       06-06    135  |  105  |  13  ----------------------------<  87  4.2   |  18<L>  |  0.67    Ca    8.6      2024 01:07  Phos  2.2     06-  Mg     2.2     -    TPro  7.0  /  Alb  3.0<L>  /  TBili  18.3<H>  /  DBili  x   /  AST  93<H>  /  ALT  251<H>  /  AlkPhos  291<H>  -      Urinalysis Basic - ( 2024 04:17 )    Color: Dark Yellow / Appearance: Clear / S.018 / pH: x  Gluc: x / Ketone: Negative mg/dL  / Bili: Large / Urobili: 2.0 mg/dL   Blood: x / Protein: Negative mg/dL / Nitrite: Negative   Leuk Esterase: Small / RBC: 6 /HPF / WBC 1 /HPF   Sq Epi: x / Non Sq Epi: 1 /HPF / Bacteria: Negative /HPF        MICROBIOLOGY:  v  .Blood Blood-Peripheral  24   No growth at 24 hours  --  --      .Blood Blood-Peripheral  24   No growth at 24 hours  --  --      .Blood Blood  24   No growth at 72 Hours  --  --      .Blood Blood  24   No growth at 72 Hours  --  --      Clean Catch Clean Catch (Midstream)  24   <10,000 CFU/mL Normal Urogenital Aviva  --  --      .Blood Blood-Peripheral  24   No growth at 5 days  --  --      .Blood Blood-Peripheral  24   No growth at 5 days  --  --      .Blood Blood  24   No growth at 5 days  --  --      .Blood Blood  24   No growth at 5 days  --  --        HIV-1 RNA Quantitative, Viral Load Log: NOT DET. lg /mL (24 @ 07:30)  CMV IgG Antibody: 4.50 U/mL (24 @ 17:13)  Toxoplasma IgG Screen: 48.5 IU/mL (24 @ 17:13)          RADIOLOGY:    < from: US Abdomen Upper Quadrant Right (24 @ 15:15) >  IMPRESSION:  No biliary ductal dilatation.    No cholecystitis.    Diffuse hepatic steatosis as seen on CT 2024.    < end of copied text >

## 2024-06-06 NOTE — PROGRESS NOTE ADULT - ASSESSMENT
38M PMH HTN, ETOH use (daily drink) PSH gastric sleeve, initially admitted to [facility] for jaundice and confusion, found to have elevated transaminases to 4000s with high MELD and withdrawal symptoms, transferred to [facility] for liver transplant workup.     [] Acute alc hep  [] ETOH Cirrhosis   - LFTs trending down, stop NAC gtt  - Delirium: ?Alcohol withdrawal: on seroquel 200mg bid,   - HE: rifaximin, d/c'd lactulose   - Rising WBC: on Layne/Fluc   - BID lab work   - Regular diet   - PETH>400  - Ongoing liver transplant eval   - possible MICU transfer

## 2024-06-06 NOTE — PROGRESS NOTE ADULT - PROBLEM SELECTOR PLAN 5
DVT ppx - heparin subq  Diet - regular  Dispo - pending DVT ppx - heparin subq  Diet - regular  GI - on PO protonix   - johny levy; can perform TOV tomorrow evening   Dispo - pending

## 2024-06-06 NOTE — PROGRESS NOTE ADULT - ATTENDING SUPERVISION STATEMENT
Fellow
Resident
Fellow
Resident
Fellow

## 2024-06-06 NOTE — PROGRESS NOTE ADULT - SUBJECTIVE AND OBJECTIVE BOX
PROGRESS NOTE:   Authored by Arturo Stahl MD, MPH  Nocturnist, Internal Medicine    HPI:  38yoM hx HTN, ETOH use (daily drinker), PSH gastric sleeve, initially admitted to [facility] for jaundice and confusion, BIBEMS as patient was wondering the streets. Noted to have significantly elevated LFTs (4000s), MELD 47, high CIWA, transfer for liver evaluation ISO acute alcoholic hepatitis vs acetaminophen hepatotoxicity. Admitted to SICU on 24. Patient was started on NAC gtt and LFTs downtrended, NAC gtt now off. In setting of hepatic recovery, now holding off liver transplant evaluation per transplant hepatology service. Course complicated by alcohol withdrawal syndrome requiring precedex gtt, now off since 6/5 AM. Pt now being managed with standing Ativan, Seroquel, and continued 1:1 observation. Pt now transferred to medicine for further management.      SUBJECTIVE / OVERNIGHT EVENTS: No events overnight.    ADDITIONAL REVIEW OF SYSTEMS: Denies fevers, chills, n/v.    MEDICATIONS  (STANDING):  amLODIPine   Tablet 5 milliGRAM(s) Oral daily  chlorhexidine 2% Cloths 1 Application(s) Topical <User Schedule>  fluconAZOLE IVPB 400 milliGRAM(s) IV Intermittent every 24 hours  fluconAZOLE IVPB      folic acid 1 milliGRAM(s) Oral daily  heparin   Injectable 5000 Unit(s) SubCutaneous every 12 hours  LORazepam     Tablet 4 milliGRAM(s) Oral every 6 hours  meropenem  IVPB 1000 milliGRAM(s) IV Intermittent every 8 hours  meropenem  IVPB      multivitamin 1 Tablet(s) Oral daily  nicotine - 21 mG/24Hr(s) Patch 1 Patch Transdermal daily  pantoprazole    Tablet 40 milliGRAM(s) Oral before breakfast  QUEtiapine 200 milliGRAM(s) Oral <User Schedule>  QUEtiapine 400 milliGRAM(s) Oral <User Schedule>  rifAXIMin 550 milliGRAM(s) Oral two times a day  thiamine 100 milliGRAM(s) Oral daily    MEDICATIONS  (PRN):  LORazepam   Injectable 2 milliGRAM(s) IV Push every 2 hours PRN Agitation      CAPILLARY BLOOD GLUCOSE      POCT Blood Glucose.: 85 mg/dL (2024 07:45)  POCT Blood Glucose.: 136 mg/dL (2024 21:26)    I&O's Summary    2024 07:01  -  2024 07:00  --------------------------------------------------------  IN: 2159.8 mL / OUT: 1455 mL / NET: 704.8 mL    2024 07:01  -  2024 20:41  --------------------------------------------------------  IN: 250 mL / OUT: 1275 mL / NET: -1025 mL        PHYSICAL EXAM:  Vital Signs Last 24 Hrs  T(C): 37.1 (2024 15:00), Max: 38.3 (2024 23:00)  T(F): 98.7 (2024 15:00), Max: 101 (2024 23:00)  HR: 100 (2024 18:00) (93 - 112)  BP: 125/61 (2024 18:00) (98/57 - 144/73)  BP(mean): 86 (2024 18:00) (70 - 100)  RR: 31 (2024 18:00) (17 - 31)  SpO2: 95% (2024 18:00) (92% - 96%)    Parameters below as of 2024 14:11  Patient On (Oxygen Delivery Method): room air        CONSTITUTIONAL: NAD, lying in bed comfortably  RESPIRATORY: Normal respiratory effort; CTABL  CARDIOVASCULAR: Regular rate and rhythm, normal S1 and S2, no murmur/rub/gallop  ABDOMEN: Soft, nondistended, nontender to palpation, normoactive bowel sounds, no rebound/guarding  MUSCLOSKELETAL: no joint swelling or tenderness to palpation, FROM all extremities  NEURO: AAOx3 to person, place, and time, full and equal strength all extremities   EXTREMITIES: no pedal edema    LABS:                        12.4   13.30 )-----------( 137      ( 2024 01:08 )             34.3     06-06    135  |  105  |  13  ----------------------------<  87  4.2   |  18<L>  |  0.67    Ca    8.6      2024 01:07  Phos  2.2     06-  Mg     2.2     06-    TPro  7.0  /  Alb  3.0<L>  /  TBili  18.3<H>  /  DBili  x   /  AST  93<H>  /  ALT  251<H>  /  AlkPhos  291<H>  06-    PT/INR - ( 2024 01:08 )   PT: 14.2 sec;   INR: 1.30 ratio         PTT - ( 2024 01:08 )  PTT:33.8 sec      Urinalysis Basic - ( 2024 04:17 )    Color: Dark Yellow / Appearance: Clear / S.018 / pH: x  Gluc: x / Ketone: Negative mg/dL  / Bili: Large / Urobili: 2.0 mg/dL   Blood: x / Protein: Negative mg/dL / Nitrite: Negative   Leuk Esterase: Small / RBC: 6 /HPF / WBC 1 /HPF   Sq Epi: x / Non Sq Epi: 1 /HPF / Bacteria: Negative /HPF        Culture - Blood (collected 2024 18:54)  Source: .Blood Blood-Peripheral  Preliminary Report (2024 22:02):    No growth at 24 hours    Culture - Blood (collected 2024 10:05)  Source: .Blood Blood-Peripheral  Preliminary Report (2024 19:02):    No growth at 48 Hours        RADIOLOGY & ADDITIONAL TESTS:     PROGRESS NOTE:   Authored by Arturo Stahl MD, MPH  Nocturnist, Internal Medicine    HPI:  38yoM hx HTN, ETOH use (daily drinker), PSH gastric sleeve, initially admitted to [facility] for jaundice and confusion, BIBEMS as patient was wondering the streets. Noted to have significantly elevated LFTs (4000s), MELD 47, high CIWA, transfer for liver evaluation ISO acute alcoholic hepatitis vs acetaminophen hepatotoxicity. Admitted to SICU on 24. Patient was started on NAC gtt and LFTs downtrended, NAC gtt now off. In setting of hepatic recovery, now holding off liver transplant evaluation per transplant hepatology service. Course complicated by alcohol withdrawal syndrome requiring precedex gtt, now off since 6/5 AM. Pt now being managed with standing Ativan, Seroquel, and continued 1:1 observation. Pt now transferred to medicine for further management.      SUBJECTIVE / OVERNIGHT EVENTS: Per PCA, one episode of combative behavior earlier in evening. Pt received his scheduled evening dose of seroquel. Pt somnolent but responsive. Denies any pain or discomfort. Pt eating and tolerating dinner.     ADDITIONAL REVIEW OF SYSTEMS: Denies fevers, chills, n/v.    MEDICATIONS  (STANDING):  amLODIPine   Tablet 5 milliGRAM(s) Oral daily  chlorhexidine 2% Cloths 1 Application(s) Topical <User Schedule>  fluconAZOLE IVPB 400 milliGRAM(s) IV Intermittent every 24 hours  fluconAZOLE IVPB      folic acid 1 milliGRAM(s) Oral daily  heparin   Injectable 5000 Unit(s) SubCutaneous every 12 hours  LORazepam     Tablet 4 milliGRAM(s) Oral every 6 hours  meropenem  IVPB 1000 milliGRAM(s) IV Intermittent every 8 hours  meropenem  IVPB      multivitamin 1 Tablet(s) Oral daily  nicotine - 21 mG/24Hr(s) Patch 1 Patch Transdermal daily  pantoprazole    Tablet 40 milliGRAM(s) Oral before breakfast  QUEtiapine 200 milliGRAM(s) Oral <User Schedule>  QUEtiapine 400 milliGRAM(s) Oral <User Schedule>  rifAXIMin 550 milliGRAM(s) Oral two times a day  thiamine 100 milliGRAM(s) Oral daily    MEDICATIONS  (PRN):  LORazepam   Injectable 2 milliGRAM(s) IV Push every 2 hours PRN Agitation      CAPILLARY BLOOD GLUCOSE      POCT Blood Glucose.: 85 mg/dL (2024 07:45)  POCT Blood Glucose.: 136 mg/dL (2024 21:26)    I&O's Summary    2024 07:01  -  2024 07:00  --------------------------------------------------------  IN: 2159.8 mL / OUT: 1455 mL / NET: 704.8 mL    2024 07:01  -  2024 20:41  --------------------------------------------------------  IN: 250 mL / OUT: 1275 mL / NET: -1025 mL        PHYSICAL EXAM:  Vital Signs Last 24 Hrs  T(C): 37.1 (2024 15:00), Max: 38.3 (2024 23:00)  T(F): 98.7 (2024 15:00), Max: 101 (2024 23:00)  HR: 100 (2024 18:00) (93 - 112)  BP: 125/61 (2024 18:00) (98/57 - 144/73)  BP(mean): 86 (2024 18:00) (70 - 100)  RR: 31 (2024 18:00) (17 - 31)  SpO2: 95% (2024 18:00) (92% - 96%)    Parameters below as of 2024 14:11  Patient On (Oxygen Delivery Method): room air        CONSTITUTIONAL: NAD, sitting up in bed comfortably  EYES: scleral icterus noted  RESPIRATORY: Normal respiratory effort; CTABL  CARDIOVASCULAR: Tachycardic, normal S1 and S2, no murmur/rub/gallop  ABDOMEN: Soft, nondistended, nontender to palpation, normoactive bowel sounds, no rebound/guarding  : Corbett in place draining tea colored urine  MUSCLOSKELETAL: no joint swelling or tenderness to palpation, FROM all extremities  NEURO: AAOx2; full and equal strength all extremities   EXTREMITIES: no pedal edema    LABS:                        12.4   13.30 )-----------( 137      ( 2024 01:08 )             34.3     06-    135  |  105  |  13  ----------------------------<  87  4.2   |  18<L>  |  0.67    Ca    8.6      2024 01:07  Phos  2.2     06-  Mg     2.2     06-    TPro  7.0  /  Alb  3.0<L>  /  TBili  18.3<H>  /  DBili  x   /  AST  93<H>  /  ALT  251<H>  /  AlkPhos  291<H>  06-    PT/INR - ( 2024 01:08 )   PT: 14.2 sec;   INR: 1.30 ratio         PTT - ( 2024 01:08 )  PTT:33.8 sec      Urinalysis Basic - ( 2024 04:17 )    Color: Dark Yellow / Appearance: Clear / S.018 / pH: x  Gluc: x / Ketone: Negative mg/dL  / Bili: Large / Urobili: 2.0 mg/dL   Blood: x / Protein: Negative mg/dL / Nitrite: Negative   Leuk Esterase: Small / RBC: 6 /HPF / WBC 1 /HPF   Sq Epi: x / Non Sq Epi: 1 /HPF / Bacteria: Negative /HPF        Culture - Blood (collected 2024 18:54)  Source: .Blood Blood-Peripheral  Preliminary Report (2024 22:02):    No growth at 24 hours    Culture - Blood (collected 2024 10:05)  Source: .Blood Blood-Peripheral  Preliminary Report (2024 19:02):    No growth at 48 Hours        RADIOLOGY & ADDITIONAL TESTS:

## 2024-06-06 NOTE — PROGRESS NOTE ADULT - SUBJECTIVE AND OBJECTIVE BOX
Progress Note   Yesi Salazar- PGY4- GI/Hep    SUBJECTIVE: Patient seen and examined at bedside.   - Patient had a temperature of 101 with tachycardia last night; he was recultured.   - More awake and engaged today; eating breakfast.     OBJECTIVE:    VITAL SIGNS:  ICU Vital Signs Last 24 Hrs  T(C): 36.9 (2024 11:00), Max: 38.3 (2024 23:00)  T(F): 98.4 (2024 11:00), Max: 101 (2024 23:00)  HR: 112 (2024 12:00) (87 - 112)  BP: 116/61 (2024 12:00) (81/46 - 144/73)  BP(mean): 82 (2024 12:00) (60 - 100)  ABP: --  ABP(mean): --  RR: 29 (2024 12:00) (17 - 29)  SpO2: 93% (2024 12:00) (92% - 97%)    O2 Parameters below as of 2024 07:00  Patient On (Oxygen Delivery Method): room air        05 @ 07:  -   @ 07:00  --------------------------------------------------------  IN: 2159.8 mL / OUT: 1455 mL / NET: 704.8 mL    06 @ 07:01  -   @ 13:17  --------------------------------------------------------  IN: 200 mL / OUT: 710 mL / NET: -510 mL        PHYSICAL EXAM:    General: NAD  HEENT: NC/AT  Neck: supple  Respiratory: Regular RR, no increase in WOB  Cardiovascular: RRR  Abdomen: soft, NT/ND; +BS x4  Extremities: WWP, 2+ peripheral pulses b/l  Skin: normal color and turgor; no rash  Neurological: More alert today, oriented x3.     MEDICATIONS:  MEDICATIONS  (STANDING):  amLODIPine   Tablet 5 milliGRAM(s) Oral daily  chlorhexidine 2% Cloths 1 Application(s) Topical <User Schedule>  fluconAZOLE IVPB      fluconAZOLE IVPB 400 milliGRAM(s) IV Intermittent every 24 hours  folic acid 1 milliGRAM(s) Oral daily  heparin   Injectable 5000 Unit(s) SubCutaneous every 12 hours  LORazepam     Tablet 4 milliGRAM(s) Oral every 4 hours  meropenem  IVPB      meropenem  IVPB 1000 milliGRAM(s) IV Intermittent every 8 hours  multivitamin 1 Tablet(s) Oral daily  nicotine - 21 mG/24Hr(s) Patch 1 Patch Transdermal daily  pantoprazole    Tablet 40 milliGRAM(s) Oral before breakfast  QUEtiapine 200 milliGRAM(s) Oral <User Schedule>  QUEtiapine 400 milliGRAM(s) Oral <User Schedule>  rifAXIMin 550 milliGRAM(s) Oral two times a day  thiamine 100 milliGRAM(s) Oral daily    MEDICATIONS  (PRN):  LORazepam   Injectable 2 milliGRAM(s) IV Push every 2 hours PRN Agitation      ALLERGIES:  Allergies    No Known Allergies    Intolerances        LABS:                        12.4   13.30 )-----------( 137      ( 2024 01:08 )             34.3     06-06    135  |  105  |  13  ----------------------------<  87  4.2   |  18<L>  |  0.67    Ca    8.6      2024 01:07  Phos  2.2     06-06  Mg     2.2     06-06    TPro  7.0  /  Alb  3.0<L>  /  TBili  18.3<H>  /  DBili  x   /  AST  93<H>  /  ALT  251<H>  /  AlkPhos  291<H>  06-06    PT/INR - ( 2024 01:08 )   PT: 14.2 sec;   INR: 1.30 ratio         PTT - ( 2024 01:08 )  PTT:33.8 sec  Urinalysis Basic - ( 2024 04:17 )    Color: Dark Yellow / Appearance: Clear / S.018 / pH: x  Gluc: x / Ketone: Negative mg/dL  / Bili: Large / Urobili: 2.0 mg/dL   Blood: x / Protein: Negative mg/dL / Nitrite: Negative   Leuk Esterase: Small / RBC: 6 /HPF / WBC 1 /HPF   Sq Epi: x / Non Sq Epi: 1 /HPF / Bacteria: Negative /HPF

## 2024-06-06 NOTE — PROGRESS NOTE ADULT - PROBLEM SELECTOR PLAN 1
Episodes of delirium concerning for alcohol withdrawal syndrome though patient should technically be out of withdrawal window. Lower likelihood for hepatic encephalopathy given improving hepatic function and downtrending ammonia.   - S/p precedex gtt in SICU  - Continue standing PO ativan for now; can switch to IV/IM if not tolerating PO.   - CIWAs currently 4-5; can start to taper ativan if remains low   -  recs appreciated - can consider using Haldol 5mg PO/IV/IM Q 6 HR for acute agitation; qtc 416 on 6/4  - Episodes of delirium concerning for alcohol withdrawal syndrome though patient should technically be out of withdrawal window. Lower likelihood for hepatic encephalopathy given improving hepatic function and downtrending ammonia.   - S/p precedex gtt in SICU  - Continue standing PO ativan for now; can switch to IV/IM if not tolerating PO.   - PRN IV ativan also ordered for acute agitation/increase in CIWA  - CIWAs currently 4-5; can start to taper ativan if remains low   -  recs appreciated - can consider using Haldol 5mg PO/IV/IM Q 6 HR for acute agitation; qtc 416 on 6/4  - Continue thiamine, folate, MV supplementation  - Continue constant observation for now Episodes of delirium concerning for alcohol withdrawal syndrome though patient should technically be out of withdrawal window. Lower likelihood for hepatic encephalopathy given improving hepatic function and downtrending ammonia.   - S/p precedex gtt in SICU  - Continue standing PO ativan for now; can switch to IV/IM if not tolerating PO.   - PRN IV ativan also ordered for acute agitation/increase in CIWA  - CIWAs currently 4-5; can start to taper ativan if remains low   -  recs appreciated - can also consider using Haldol 5mg PO/IV/IM Q 6 HR for acute agitation; qtc 416 on 6/4  - Continue seroquel 200mg in AM, 400mg in PM  - Continue thiamine, folate, MV supplementation  - Continue constant observation for now

## 2024-06-06 NOTE — BH CONSULTATION LIAISON PROGRESS NOTE - [id number]_PSY_A_CORE
I have personally seen and examined this patient. I fully participated in the care of this patient. I have made amendments to the documentation where appropriate and otherwise agree with the history, physical exam, and plan as documented by the JEAN
I have personally seen and examined this patient. I fully participated in the care of this patient. I have made amendments to the documentation where appropriate and otherwise agree with the history, physical exam, and plan as documented by the JEAN

## 2024-06-06 NOTE — BH CONSULTATION LIAISON PROGRESS NOTE - NSBHASSESSMENTFT_PSY_ALL_CORE
38-year-old, , , male, with PPHx of AUD, with no past psychiatric admissions, with PMH HTN, PSH gastric sleeve, initially admitted to [facility] for jaundice and confusion, found to have elevated transaminases to 4000s with high MELD and withdrawal symptoms, transferred to [facility] for liver transplant workup. Patient seen by transplant psychiatry as part of liver transplant evaluation.    5/31:Patient seen at bedside with nursing staff in the room, on exam patient was A/Ox1/2, hypersomnolent, with slurred speech, arousable only to physical stimuli. Patient noted that he still responding to internal stimuli on exam, noting that he experiencing AH and tactile while on Precedex at this time, and is still actively withdrawing from alcohol. Nursing staff at bedside noted that patient has been agitated, requiring increase in Precedex and multiple doses of PRN Ativan, and has received 10mg of Ativan in a 24 hour period at this time. Patient may benefit from phenobarbital use for acute withdrawal.   6/3: Patient on exam, continues to be agitated, responding to internal stimuli, A/Ox1, while receiving 12mg of Ativan as part of CIWA taper.    6/6:Patent on exam hypersomnolent, patient continues to experience withdrawal from alcohol at this time.      Plan  ***-Consider switch to use of phenobarbital in lieu of Ativan use w/ consistently elevated CIWA scores, as patient continues to be in withdrawal after receiving increased Ativan Taper******  -Standing and Symptom-triggered CIWA as per primary team (Change route to IM/IV)  -Can consider using Haldol 1mg PO/IV/IM Q 6 HRs for acute agitation, monitor if QTC<500   -Consider Haldol 5 mg BID PO HS to aid with mood stabilization in lieu of Seroquel use   -behavioral disturbance likely in setting of withdrawal, please use Ativan prior to use of antipsychotics for further deterioration  -SIPAT pending further education about transplant process   -High dose IV Thiamine supplementation  -B12, folate, TSH w/ FT4, PETH >400 (5/29)   38-year-old, , , male, with PPHx of AUD, with no past psychiatric admissions, with PMH HTN, PSH gastric sleeve, initially admitted to [facility] for jaundice and confusion, found to have elevated transaminases to 4000s with high MELD and withdrawal symptoms, transferred to [facility] for liver transplant workup. Patient seen by transplant psychiatry as part of liver transplant evaluation.    5/31:Patient seen at bedside with nursing staff in the room, on exam patient was A/Ox1/2, hypersomnolent, with slurred speech, arousable only to physical stimuli. Patient noted that he still responding to internal stimuli on exam, noting that he experiencing AH and tactile while on Precedex at this time, and is still actively withdrawing from alcohol. Nursing staff at bedside noted that patient has been agitated, requiring increase in Precedex and multiple doses of PRN Ativan, and has received 10mg of Ativan in a 24 hour period at this time. Patient may benefit from phenobarbital use for acute withdrawal.   6/3: Patient on exam, continues to be agitated, responding to internal stimuli, A/Ox1, while receiving 12mg of Ativan as part of CIWA taper.    6/6:Patent on exam hypersomnolent, patient continues to experience withdrawal from alcohol at this time.      Plan  ***-Consider switch to use of phenobarbital in lieu of Ativan use w/ consistently elevated CIWA scores, as patient continues to be in withdrawal after receiving increased Ativan Taper******  -Standing and Symptom-triggered CIWA as per primary team (Change route to IM/IV)  -Can consider using Haldol 5mg PO/IV/IM Q 6 HRs for acute agitation, monitor if QTC<500   -Consider Haldol 5 mg BID PO HS to aid with mood stabilization in lieu of Seroquel use   -behavioral disturbance likely in setting of withdrawal, please use Ativan prior to use of antipsychotics for further deterioration  -SIPAT pending further education about transplant process   -High dose IV Thiamine supplementation  -B12, folate, TSH w/ FT4, PETH >400 (5/29)   38-year-old, , , male, with PPHx of AUD, with no past psychiatric admissions, with PMH HTN, PSH gastric sleeve, initially admitted to [facility] for jaundice and confusion, found to have elevated transaminases to 4000s with high MELD and withdrawal symptoms, transferred to [facility] for liver transplant workup. Patient seen by transplant psychiatry as part of liver transplant evaluation.    5/31:Patient seen at bedside with nursing staff in the room, on exam patient was A/Ox1/2, hypersomnolent, with slurred speech, arousable only to physical stimuli. Patient noted that he still responding to internal stimuli on exam, noting that he experiencing AH and tactile while on Precedex at this time, and is still actively withdrawing from alcohol. Nursing staff at bedside noted that patient has been agitated, requiring increase in Precedex and multiple doses of PRN Ativan, and has received 10mg of Ativan in a 24 hour period at this time. Patient may benefit from phenobarbital use for acute withdrawal.   6/3: Patient on exam, continues to be agitated, responding to internal stimuli, A/Ox1, while receiving 12mg of Ativan as part of CIWA taper.    6/6:Patent on exam hypersomnolent, patient continues to experience withdrawal from alcohol at this time.      Plan  ***-Consider switch to use of phenobarbital in lieu of Ativan use w/ consistently elevated CIWA scores, as patient continues to be in withdrawal after receiving increased Ativan Taper******  -Standing and Symptom-triggered CIWA as per primary team (Change route to IM/IV)  -Can consider using Haldol 5mg PO/IV/IM Q 6 HRs for acute agitation, monitor if QTC<500   -Consider Haldol 5 mg BID PO HS to aid with mood stabilization in lieu of Seroquel use   -behavioral disturbance likely in setting of withdrawal, please use Ativan prior to use of antipsychotics for further deterioration  -SIPAT pending further education about transplant process - Transplant evaluation has been held at this time  -High dose IV Thiamine supplementation  -B12, folate, TSH w/ FT4, PETH >400 (5/29)

## 2024-06-06 NOTE — PROGRESS NOTE ADULT - SUBJECTIVE AND OBJECTIVE BOX
Interval events:  - No acute issues overnight.   - Consider condom cath.    NEURO  RASS (if intubated): 		CAM ICU (if concern for delirium):  Exam: AO X2  Meds: LORazepam     Tablet 4 milliGRAM(s) Oral every 4 hours  LORazepam   Injectable 2 milliGRAM(s) IV Push every 2 hours PRN Agitation  QUEtiapine 400 milliGRAM(s) Oral <User Schedule>  QUEtiapine 200 milliGRAM(s) Oral <User Schedule>      RESPIRATORY  RR: 20 (06-06-24 @ 03:00) (13 - 29)  SpO2: 95% (06-06-24 @ 03:00) (93% - 99%)  Wt(kg): --  Exam: nonlabored respirations  Mechanical Ventilation:     Meds:     CARDIOVASCULAR  HR: 109 (06-06-24 @ 03:00) (71 - 109)  BP: 134/67 (06-06-24 @ 03:00) (81/46 - 144/73)  BP(mean): 96 (06-06-24 @ 03:00) (60 - 100)  ABP: --  ABP(mean): --  Wt(kg): --  CVP(cm H2O): --      Exam: pulse present  Meds: amLODIPine   Tablet 5 milliGRAM(s) Oral daily      GI/NUTRITION  Exam: soft, nontender  Diet: regular  Meds: pantoprazole    Tablet 40 milliGRAM(s) Oral before breakfast      GENITOURINARY  I&O's Detail    06-04 @ 07:01  -  06-05 @ 07:00  --------------------------------------------------------  IN:    Dexmedetomidine: 19.8 mL    Dexmedetomidine: 116.7 mL    IV PiggyBack: 300 mL    IV PiggyBack: 50 mL    Oral Fluid: 360 mL  Total IN: 846.5 mL    OUT:    Indwelling Catheter - Urethral (mL): 2385 mL  Total OUT: 2385 mL    Total NET: -1538.5 mL      06-05 @ 07:01  -  06-06 @ 03:53  --------------------------------------------------------  IN:    IV PiggyBack: 200 mL    IV PiggyBack: 350 mL    IV PiggyBack: 166.6 mL    Oral Fluid: 820 mL  Total IN: 1536.6 mL    OUT:    Indwelling Catheter - Urethral (mL): 920 mL  Total OUT: 920 mL    Total NET: 616.6 mL          06-06    135  |  105  |  13  ----------------------------<  87  4.2   |  18<L>  |  0.67    Ca    8.6      06 Jun 2024 01:07  Phos  2.2     06-06  Mg     2.2     06-06    TPro  7.0  /  Alb  3.0<L>  /  TBili  18.3<H>  /  DBili  x   /  AST  93<H>  /  ALT  251<H>  /  AlkPhos  291<H>  06-06    Meds: folic acid 1 milliGRAM(s) Oral daily  multivitamin 1 Tablet(s) Oral daily  thiamine 100 milliGRAM(s) Oral daily      HEMATOLOGIC  Meds: heparin   Injectable 5000 Unit(s) SubCutaneous every 12 hours                          12.4   13.30 )-----------( 137      ( 06 Jun 2024 01:08 )             34.3     PT/INR - ( 06 Jun 2024 01:08 )   PT: 14.2 sec;   INR: 1.30 ratio         PTT - ( 06 Jun 2024 01:08 )  PTT:33.8 sec    INFECTIOUS DISEASES  T(C): 37.5 (06-06-24 @ 01:00), Max: 38.3 (06-05-24 @ 23:00)  Wt(kg): --  WBC Count: 13.30 K/uL (06-06 @ 01:08)  WBC Count: 14.67 K/uL (06-05 @ 06:43)    Recent Cultures:  Specimen Source: .Blood Blood-Peripheral, 06-04 @ 18:54; Results   No growth at 24 hours; Gram Stain: --; Organism: --  Specimen Source: .Blood Blood-Peripheral, 06-04 @ 10:05; Results   No growth at 24 hours; Gram Stain: --; Organism: --  Specimen Source: .Blood Blood, 06-02 @ 09:30; Results   No growth at 72 Hours; Gram Stain: --; Organism: --  Specimen Source: .Blood Blood, 06-02 @ 09:15; Results   No growth at 72 Hours; Gram Stain: --; Organism: --    Meds: fluconAZOLE IVPB      fluconAZOLE IVPB 400 milliGRAM(s) IV Intermittent every 24 hours  meropenem  IVPB      meropenem  IVPB 1000 milliGRAM(s) IV Intermittent every 8 hours  rifAXIMin 550 milliGRAM(s) Oral two times a day      ENDOCRINE  Capillary Blood Glucose    Meds: insulin lispro (ADMELOG) corrective regimen sliding scale   SubCutaneous three times a day before meals  insulin lispro (ADMELOG) corrective regimen sliding scale   SubCutaneous at bedtime        OTHER MEDICATIONS:  chlorhexidine 2% Cloths 1 Application(s) Topical <User Schedule>  nicotine - 21 mG/24Hr(s) Patch 1 Patch Transdermal daily      IMAGING:

## 2024-06-06 NOTE — BH CONSULTATION LIAISON PROGRESS NOTE - NSBHATTESTCOMMENTATTENDFT_PSY_A_CORE
Patient seen with ACP, coordination of care provided, labs/chart reviewed. Agree with above assessment and plan. Patient obtunded on exam, with intermittent episodes of behavioral disturbance per nursing staff. Given possibility of withdrawal symptoms refractory to ongoing high dose Ativan taper, consider switch to Phenobarbital. Can consider switch to Haldol from Seroquel as well.

## 2024-06-06 NOTE — PROGRESS NOTE ADULT - ASSESSMENT
38M PMH HTN, ETOH use (daily drink) PSH gastric sleeve, initially admitted to [facility] for jaundice and confusion, found to have elevated transaminases to 4000s with high MELD and withdrawal symptoms, transferred to [facility] for liver transplant workup.     MRSA/MSSA nasal PCR (May 29) negative  Urine culture (May 29) no growth  Blood cultures (May 29) no growth to date    CT neck (May 29) Enlarged palatine tonsils without hyperemia or surrounding infiltrative changes.  CT chest (May 29) no focal consolidation  CT abdomen pelvis (May 29) Diffuse, marked hepatic steatosis, Question of mild gallbladder wall thickening  RUQ US (6/5): no cholecystitis    #Acute liver injury, transaminitis  Hepatitis A IgM (May 29) negative  Hepatitis B DNA by PCR (May 29) negative  Hepatitis C RNA (May 29) negative  CMV by PCR (May 29) negative  HSV 1/2 serum PCR Negative  Adenovirus serum PCR was inconclusive  Varicella DNA PCR negative  HSV PCR 1/2 is negative  Negative HIV viral load      #Preliver transplant evaluation  COVID19 Ortiz Antibody positive  COVID19 Nucleocapsid Antibody positive  HAV IgG positive  HBVs Ab Negative, HBVs Ag Negative, HBVc Ab negative, HBV PCR Negative  HCV Ab negative, HCV PCR Negative  HSV 1 IgG positive  HSV 2 IgG Equivocal  EBV IgG positive  CMV IgG positive  VZV IgG positive  Measles IgG negative, Mumps IgG negative, Rubella Equivocal  HIV Ag/Ab by CMIA negative  Toxoplasma IgG positive  Strongyloides Ab negative  Coccidioides antibody negative  Quantiferon indeterminate - T spot pending  --No absolute ID contraindication for OLT listing  -- Follow-up on Trypanosoma cruzi Ab      #Leukocytosis and fever  s/p course of ceftriaxone 5/29-6/1 and Zosyn 6/2-6/4. Multiple recent negative cultures  - can continue meropenem and fluconazole for now, anticipate de-escalation in the near term  - f/u repeat blood cultures from 6/5  - would send GI-PCR and stool for C.diff testing if diarrhea continues      #Encounter to Vaccinate Patient - Will defer vaccinations until further clinical stability given acuity of presentation  COVID19: Would benefit from COVID19 [id number] Vaccine Dose  Influenza: Will require with next season  Pneumococcal: Would benefit from PCV20  HAV: Immune, would not require further vaccination  HBV: Would benefit from Heplisav vaccination  MMR: Not mumps or rubeola immune, ideally would require dose of MMR but this would be a live vaccine  Varicella: Immune, would not require further vaccination  Shingles: Will require Shingrix  Tdap: Will require Tdap    Nicanor Castaneda, PGY4  ID Fellow  [employer] Teams Preferred  After 5pm/weekends call [phone] 38M PMH HTN, ETOH use (daily drink) PSH gastric sleeve, initially admitted to [facility] for jaundice and confusion, found to have elevated transaminases to 4000s with high MELD and withdrawal symptoms, transferred to [facility] for liver transplant workup.     MRSA/MSSA nasal PCR (May 29) negative  Urine culture (May 29) no growth  Blood cultures (May 29) no growth to date    CT neck (May 29) Enlarged palatine tonsils without hyperemia or surrounding infiltrative changes.  CT chest (May 29) no focal consolidation  CT abdomen pelvis (May 29) Diffuse, marked hepatic steatosis, Question of mild gallbladder wall thickening  RUQ US (6/5): no cholecystitis    #Acute liver injury, transaminitis  Hepatitis A IgM (May 29) negative  Hepatitis B DNA by PCR (May 29) negative  Hepatitis C RNA (May 29) negative  CMV by PCR (May 29) negative  HSV 1/2 serum PCR Negative  Adenovirus serum PCR was inconclusive  Varicella DNA PCR negative  HSV PCR 1/2 is negative  Negative HIV viral load      #Preliver transplant evaluation  COVID19 Ortiz Antibody positive  COVID19 Nucleocapsid Antibody positive  HAV IgG positive  HBVs Ab Negative, HBVs Ag Negative, HBVc Ab negative, HBV PCR Negative  HCV Ab negative, HCV PCR Negative  HSV 1 IgG positive  HSV 2 IgG Equivocal  EBV IgG positive  CMV IgG positive  VZV IgG positive  Measles IgG negative, Mumps IgG negative, Rubella Equivocal  HIV Ag/Ab by CMIA negative  Toxoplasma IgG positive  Strongyloides Ab negative  Coccidioides antibody negative  Quantiferon indeterminate - T spot pending  --No absolute ID contraindication for OLT listing  -- Follow-up on Trypanosoma cruzi Ab      #Leukocytosis and fever  s/p course of ceftriaxone 5/29-6/1 and Zosyn 6/2-6/4. Multiple recent negative cultures. Now with fever to 101 on 6/5  - can continue meropenem and fluconazole for now, in setting of fever  - follow-up repeat blood cultures from 6/5  - would send GI-PCR and stool for C.diff testing if diarrhea continues      #Encounter to Vaccinate Patient - Will defer vaccinations until further clinical stability given acuity of presentation  COVID19: Would benefit from COVID19 [id number] Vaccine Dose  Influenza: Will require with next season  Pneumococcal: Would benefit from PCV20  HAV: Immune, would not require further vaccination  HBV: Would benefit from Heplisav vaccination  MMR: Not mumps or rubeola immune, ideally would require dose of MMR but this would be a live vaccine  Varicella: Immune, would not require further vaccination  Shingles: Will require Shingrix  Tdap: Will require Tdap    Nicanor Castaneda, PGY4  ID Fellow  [employer] Teams Preferred  After 5pm/weekends call [phone]

## 2024-06-06 NOTE — PROGRESS NOTE ADULT - SUBJECTIVE AND OBJECTIVE BOX
Transplant Surgery - Multidisciplinary Rounds  --------------------------------------------------------------  Present:   Patient seen and examined with multidisciplinary Transplant team including Surgeon: Dr Dagher, Hepatologist: Dr Beth,  Noa Stene, and beside RN during AM rounds. Disciplines not in attendance will be notified of the plan.     HPI: 38M PMH HTN, ETOH use (daily drink) PSH gastric sleeve, initially admitted to [facility] for jaundice and confusion, found to have elevated transaminases to 4000s with high MELD and withdrawal symptoms, transferred to [facility] for liver transplant workup.     Interval Events:   - Febrile to 100.9, VSS  - PETH >400  - LE duplex negative  - RUQ u/s with steatosis, no biliary dilitation       Potential Discharge date: TBD  Education:  Medications  Plan of care:  See Below        MEDICATIONS  (STANDING):  amLODIPine   Tablet 5 milliGRAM(s) Oral daily  chlorhexidine 2% Cloths 1 Application(s) Topical <User Schedule>  fluconAZOLE IVPB      fluconAZOLE IVPB 400 milliGRAM(s) IV Intermittent every 24 hours  folic acid 1 milliGRAM(s) Oral daily  heparin   Injectable 5000 Unit(s) SubCutaneous every 12 hours  LORazepam     Tablet 4 milliGRAM(s) Oral every 4 hours  meropenem  IVPB      meropenem  IVPB 1000 milliGRAM(s) IV Intermittent every 8 hours  multivitamin 1 Tablet(s) Oral daily  nicotine - 21 mG/24Hr(s) Patch 1 Patch Transdermal daily  pantoprazole    Tablet 40 milliGRAM(s) Oral before breakfast  QUEtiapine 200 milliGRAM(s) Oral <User Schedule>  QUEtiapine 400 milliGRAM(s) Oral <User Schedule>  rifAXIMin 550 milliGRAM(s) Oral two times a day  thiamine 100 milliGRAM(s) Oral daily    MEDICATIONS  (PRN):  LORazepam   Injectable 2 milliGRAM(s) IV Push every 2 hours PRN Agitation      PAST MEDICAL & SURGICAL HISTORY:  Hypertension, unspecified type      H/O gastric sleeve          Vital Signs Last 24 Hrs  T(C): 36.9 (06 Jun 2024 11:00), Max: 38.3 (05 Jun 2024 23:00)  T(F): 98.4 (06 Jun 2024 11:00), Max: 101 (05 Jun 2024 23:00)  HR: 112 (06 Jun 2024 12:00) (87 - 112)  BP: 116/61 (06 Jun 2024 12:00) (81/46 - 144/73)  BP(mean): 82 (06 Jun 2024 12:00) (60 - 100)  RR: 29 (06 Jun 2024 12:00) (17 - 29)  SpO2: 93% (06 Jun 2024 12:00) (92% - 97%)    Parameters below as of 06 Jun 2024 07:00  Patient On (Oxygen Delivery Method): room air        I&O's Summary    05 Jun 2024 07:01  -  06 Jun 2024 07:00  --------------------------------------------------------  IN: 2159.8 mL / OUT: 1455 mL / NET: 704.8 mL    06 Jun 2024 07:01  -  06 Jun 2024 13:40  --------------------------------------------------------  IN: 200 mL / OUT: 710 mL / NET: -510 mL                              12.4   13.30 )-----------( 137      ( 06 Jun 2024 01:08 )             34.3     06-06    135  |  105  |  13  ----------------------------<  87  4.2   |  18<L>  |  0.67    Ca    8.6      06 Jun 2024 01:07  Phos  2.2     06-06  Mg     2.2     06-06    TPro  7.0  /  Alb  3.0<L>  /  TBili  18.3<H>  /  DBili  x   /  AST  93<H>  /  ALT  251<H>  /  AlkPhos  291<H>  06-06          Culture - Blood (collected 06-04-24 @ 18:54)  Source: .Blood Blood-Peripheral  Preliminary Report (06-05-24 @ 22:02):    No growth at 24 hours    Culture - Blood (collected 06-04-24 @ 10:05)  Source: .Blood Blood-Peripheral  Preliminary Report (06-05-24 @ 19:02):    No growth at 24 hours    Culture - Blood (collected 06-02-24 @ 09:30)  Source: .Blood Blood  Preliminary Report (06-06-24 @ 13:01):    No growth at 4 days    Culture - Blood (collected 06-02-24 @ 09:15)  Source: .Blood Blood  Preliminary Report (06-06-24 @ 13:01):    No growth at 4 days        Review of systems  unable to obtain, AMS      PHYSICAL EXAM:  GENERAL: confused  HEENT:  NCAT, + scleral icterus  CHEST: no resp distress  HEART:  RRR  ABDOMEN:  Soft, non-tender, non-distended, no masses  EXTREMITIES:  No cyanosis, clubbing, or edema  SKIN:  No rash/erythema/ecchymoses/petechiae/wounds/abscess/warm/dry  NEURO:  Alert and oriented x 1

## 2024-06-07 LAB
ALBUMIN SERPL ELPH-MCNC: 3.3 G/DL — SIGNIFICANT CHANGE UP (ref 3.3–5)
ALP SERPL-CCNC: 268 U/L — HIGH (ref 40–120)
ALT FLD-CCNC: 198 U/L — HIGH (ref 10–45)
AMMONIA BLD-MCNC: 49 UMOL/L — SIGNIFICANT CHANGE UP (ref 11–55)
ANION GAP SERPL CALC-SCNC: 13 MMOL/L — SIGNIFICANT CHANGE UP (ref 5–17)
ANISOCYTOSIS BLD QL: SIGNIFICANT CHANGE UP
AST SERPL-CCNC: 92 U/L — HIGH (ref 10–40)
BASOPHILS # BLD AUTO: 0.09 K/UL — SIGNIFICANT CHANGE UP (ref 0–0.2)
BASOPHILS NFR BLD AUTO: 0.9 % — SIGNIFICANT CHANGE UP (ref 0–2)
BILIRUB SERPL-MCNC: 16.7 MG/DL — HIGH (ref 0.2–1.2)
BUN SERPL-MCNC: 16 MG/DL — SIGNIFICANT CHANGE UP (ref 7–23)
CALCIUM SERPL-MCNC: 9.1 MG/DL — SIGNIFICANT CHANGE UP (ref 8.4–10.5)
CHLORIDE SERPL-SCNC: 101 MMOL/L — SIGNIFICANT CHANGE UP (ref 96–108)
CO2 SERPL-SCNC: 21 MMOL/L — LOW (ref 22–31)
CREAT SERPL-MCNC: 0.69 MG/DL — SIGNIFICANT CHANGE UP (ref 0.5–1.3)
CULTURE RESULTS: SIGNIFICANT CHANGE UP
CULTURE RESULTS: SIGNIFICANT CHANGE UP
EGFR: 122 ML/MIN/1.73M2 — SIGNIFICANT CHANGE UP
EOSINOPHIL # BLD AUTO: 0 K/UL — SIGNIFICANT CHANGE UP (ref 0–0.5)
EOSINOPHIL NFR BLD AUTO: 0 % — SIGNIFICANT CHANGE UP (ref 0–6)
GLUCOSE SERPL-MCNC: 69 MG/DL — LOW (ref 70–99)
HCT VFR BLD CALC: 32.8 % — LOW (ref 39–50)
HGB BLD-MCNC: 11.5 G/DL — LOW (ref 13–17)
HYPOCHROMIA BLD QL: SLIGHT — SIGNIFICANT CHANGE UP
INR BLD: 1.21 RATIO — HIGH (ref 0.85–1.18)
LACTATE SERPL-SCNC: 1 MMOL/L — SIGNIFICANT CHANGE UP (ref 0.5–2)
LYMPHOCYTES # BLD AUTO: 1.21 K/UL — SIGNIFICANT CHANGE UP (ref 1–3.3)
LYMPHOCYTES # BLD AUTO: 11.8 % — LOW (ref 13–44)
MACROCYTES BLD QL: SIGNIFICANT CHANGE UP
MAGNESIUM SERPL-MCNC: 2.3 MG/DL — SIGNIFICANT CHANGE UP (ref 1.6–2.6)
MANUAL SMEAR VERIFICATION: SIGNIFICANT CHANGE UP
MCHC RBC-ENTMCNC: 32.1 PG — SIGNIFICANT CHANGE UP (ref 27–34)
MCHC RBC-ENTMCNC: 35.1 GM/DL — SIGNIFICANT CHANGE UP (ref 32–36)
MCV RBC AUTO: 91.6 FL — SIGNIFICANT CHANGE UP (ref 80–100)
METAMYELOCYTES # FLD: 0.9 % — HIGH (ref 0–0)
MONOCYTES # BLD AUTO: 1.12 K/UL — HIGH (ref 0–0.9)
MONOCYTES NFR BLD AUTO: 10.9 % — SIGNIFICANT CHANGE UP (ref 2–14)
MYELOCYTES NFR BLD: 2.7 % — HIGH (ref 0–0)
NEUTROPHILS # BLD AUTO: 7.48 K/UL — HIGH (ref 1.8–7.4)
NEUTROPHILS NFR BLD AUTO: 70 % — SIGNIFICANT CHANGE UP (ref 43–77)
NEUTS BAND # BLD: 2.8 % — SIGNIFICANT CHANGE UP (ref 0–8)
PHOSPHATE SERPL-MCNC: 2 MG/DL — LOW (ref 2.5–4.5)
PLAT MORPH BLD: NORMAL — SIGNIFICANT CHANGE UP
PLATELET # BLD AUTO: 159 K/UL — SIGNIFICANT CHANGE UP (ref 150–400)
POTASSIUM SERPL-MCNC: 4 MMOL/L — SIGNIFICANT CHANGE UP (ref 3.5–5.3)
POTASSIUM SERPL-SCNC: 4 MMOL/L — SIGNIFICANT CHANGE UP (ref 3.5–5.3)
PROT SERPL-MCNC: 7.5 G/DL — SIGNIFICANT CHANGE UP (ref 6–8.3)
PROTHROM AB SERPL-ACNC: 12.6 SEC — SIGNIFICANT CHANGE UP (ref 9.5–13)
RBC # BLD: 3.58 M/UL — LOW (ref 4.2–5.8)
RBC # FLD: 18.1 % — HIGH (ref 10.3–14.5)
RBC BLD AUTO: ABNORMAL
SODIUM SERPL-SCNC: 135 MMOL/L — SIGNIFICANT CHANGE UP (ref 135–145)
SPECIMEN SOURCE: SIGNIFICANT CHANGE UP
SPECIMEN SOURCE: SIGNIFICANT CHANGE UP
TARGETS BLD QL SMEAR: SIGNIFICANT CHANGE UP
VIT B1 SERPL-MCNC: 188.4 NMOL/L — SIGNIFICANT CHANGE UP (ref 66.5–200)
WBC # BLD: 10.28 K/UL — SIGNIFICANT CHANGE UP (ref 3.8–10.5)
WBC # FLD AUTO: 10.28 K/UL — SIGNIFICANT CHANGE UP (ref 3.8–10.5)

## 2024-06-07 PROCEDURE — 99232 SBSQ HOSP IP/OBS MODERATE 35: CPT | Mod: GC

## 2024-06-07 PROCEDURE — 99231 SBSQ HOSP IP/OBS SF/LOW 25: CPT

## 2024-06-07 PROCEDURE — 99233 SBSQ HOSP IP/OBS HIGH 50: CPT

## 2024-06-07 RX ORDER — HALOPERIDOL DECANOATE 100 MG/ML
5 INJECTION INTRAMUSCULAR EVERY 6 HOURS
Refills: 0 | Status: DISCONTINUED | OUTPATIENT
Start: 2024-06-07 | End: 2024-06-11

## 2024-06-07 RX ORDER — HALOPERIDOL DECANOATE 100 MG/ML
2.5 INJECTION INTRAMUSCULAR EVERY 12 HOURS
Refills: 0 | Status: DISCONTINUED | OUTPATIENT
Start: 2024-06-07 | End: 2024-06-08

## 2024-06-07 RX ORDER — QUETIAPINE FUMARATE 200 MG/1
100 TABLET, FILM COATED ORAL
Refills: 0 | Status: DISCONTINUED | OUTPATIENT
Start: 2024-06-08 | End: 2024-06-08

## 2024-06-07 RX ORDER — HALOPERIDOL DECANOATE 100 MG/ML
5 INJECTION INTRAMUSCULAR EVERY 6 HOURS
Refills: 0 | Status: DISCONTINUED | OUTPATIENT
Start: 2024-06-07 | End: 2024-06-07

## 2024-06-07 RX ORDER — QUETIAPINE FUMARATE 200 MG/1
200 TABLET, FILM COATED ORAL AT BEDTIME
Refills: 0 | Status: DISCONTINUED | OUTPATIENT
Start: 2024-06-07 | End: 2024-06-08

## 2024-06-07 RX ORDER — HALOPERIDOL DECANOATE 100 MG/ML
5 INJECTION INTRAMUSCULAR
Refills: 0 | Status: DISCONTINUED | OUTPATIENT
Start: 2024-06-07 | End: 2024-06-07

## 2024-06-07 RX ORDER — LACTULOSE 10 G/15ML
20 SOLUTION ORAL DAILY
Refills: 0 | Status: DISCONTINUED | OUTPATIENT
Start: 2024-06-07 | End: 2024-06-08

## 2024-06-07 RX ADMIN — LACTULOSE 20 GRAM(S): 10 SOLUTION ORAL at 13:33

## 2024-06-07 RX ADMIN — Medication 4 MILLIGRAM(S): at 00:06

## 2024-06-07 RX ADMIN — MEROPENEM 100 MILLIGRAM(S): 1 INJECTION INTRAVENOUS at 06:46

## 2024-06-07 RX ADMIN — MEROPENEM 100 MILLIGRAM(S): 1 INJECTION INTRAVENOUS at 13:33

## 2024-06-07 RX ADMIN — Medication 2 MILLIGRAM(S): at 10:37

## 2024-06-07 RX ADMIN — AMLODIPINE BESYLATE 5 MILLIGRAM(S): 2.5 TABLET ORAL at 06:46

## 2024-06-07 RX ADMIN — QUETIAPINE FUMARATE 200 MILLIGRAM(S): 200 TABLET, FILM COATED ORAL at 22:09

## 2024-06-07 RX ADMIN — Medication 1 PATCH: at 08:04

## 2024-06-07 RX ADMIN — HEPARIN SODIUM 5000 UNIT(S): 5000 INJECTION INTRAVENOUS; SUBCUTANEOUS at 06:46

## 2024-06-07 RX ADMIN — CHLORHEXIDINE GLUCONATE 1 APPLICATION(S): 213 SOLUTION TOPICAL at 09:25

## 2024-06-07 RX ADMIN — FLUCONAZOLE 100 MILLIGRAM(S): 150 TABLET ORAL at 11:42

## 2024-06-07 RX ADMIN — MEROPENEM 100 MILLIGRAM(S): 1 INJECTION INTRAVENOUS at 22:09

## 2024-06-07 RX ADMIN — HEPARIN SODIUM 5000 UNIT(S): 5000 INJECTION INTRAVENOUS; SUBCUTANEOUS at 18:05

## 2024-06-07 RX ADMIN — Medication 1 PATCH: at 13:28

## 2024-06-07 RX ADMIN — Medication 2 MILLIGRAM(S): at 03:24

## 2024-06-07 RX ADMIN — Medication 4 MILLIGRAM(S): at 18:27

## 2024-06-07 RX ADMIN — HALOPERIDOL DECANOATE 2.5 MILLIGRAM(S): 100 INJECTION INTRAMUSCULAR at 18:27

## 2024-06-07 RX ADMIN — Medication 4 MILLIGRAM(S): at 06:46

## 2024-06-07 RX ADMIN — PANTOPRAZOLE SODIUM 40 MILLIGRAM(S): 20 TABLET, DELAYED RELEASE ORAL at 06:46

## 2024-06-07 NOTE — PROGRESS NOTE ADULT - SUBJECTIVE AND OBJECTIVE BOX
Interval Events:   No acute events overnight.  Patient still wearing a restraint vest, however per nursing, not currently in restraints.  However, PCA at bedside and still under 1:1 supervision.  Patient seems upset he has not given permission to have his cell phone.  He is A/Ox3 without medical complaints at this time.    ROS:   12 point review of systems performed and negative except otherwise noted in HPI.    Hospital Medications:  amLODIPine   Tablet 5 milliGRAM(s) Oral daily  chlorhexidine 2% Cloths 1 Application(s) Topical <User Schedule>  fluconAZOLE IVPB      fluconAZOLE IVPB 400 milliGRAM(s) IV Intermittent every 24 hours  folic acid 1 milliGRAM(s) Oral daily  heparin   Injectable 5000 Unit(s) SubCutaneous every 12 hours  LORazepam     Tablet 4 milliGRAM(s) Oral every 6 hours  LORazepam   Injectable 2 milliGRAM(s) IV Push every 2 hours PRN  meropenem  IVPB      meropenem  IVPB 1000 milliGRAM(s) IV Intermittent every 8 hours  multivitamin 1 Tablet(s) Oral daily  nicotine - 21 mG/24Hr(s) Patch 1 Patch Transdermal daily  pantoprazole    Tablet 40 milliGRAM(s) Oral before breakfast  QUEtiapine 400 milliGRAM(s) Oral <User Schedule>  QUEtiapine 200 milliGRAM(s) Oral <User Schedule>  rifAXIMin 550 milliGRAM(s) Oral two times a day  thiamine 100 milliGRAM(s) Oral daily      PHYSICAL EXAM:   Vital Signs:  Vital Signs Last 24 Hrs  T(C): 36.8 (2024 06:23), Max: 37.1 (2024 15:00)  T(F): 98.2 (2024 06:23), Max: 98.7 (2024 15:00)  HR: 98 (2024 06:23) (92 - 112)  BP: 124/76 (2024 06:23) (98/57 - 145/83)  BP(mean): 86 (2024 18:00) (70 - 86)  RR: 18 (2024 06:23) (18 - 31)  SpO2: 97% (2024 06:23) (92% - 97%)    Parameters below as of 2024 06:23  Patient On (Oxygen Delivery Method): room air      Daily     Daily Weight in k.6 (2024 06:23)    GENERAL: no acute distress  NEURO: alert, no asterixis  HEENT: scleral icterus, NCAT, no conjunctival pallor appreciated  CHEST: no respiratory distress, no accessory muscle use  CARDIAC: regular rate, +S1/S2  ABDOMEN: soft, nontender, no rebound or guarding  EXTREMITIES: warm, well perfused  SKIN: no lesions noted    LABS: reviewed                        11.5   10.28 )-----------( 159      ( 2024 07:15 )             32.8         135  |  101  |  16  ----------------------------<  69<L>  4.0   |  21<L>  |  0.69    Ca    9.1      2024 07:00  Phos  2.0       Mg     2.3         TPro  7.5  /  Alb  3.3  /  TBili  16.7<H>  /  DBili  x   /  AST  92<H>  /  ALT  198<H>  /  AlkPhos  268<H>      LIVER FUNCTIONS - ( 2024 07:00 )  Alb: 3.3 g/dL / Pro: 7.5 g/dL / ALK PHOS: 268 U/L / ALT: 198 U/L / AST: 92 U/L / GGT: x             Interval Diagnostic Studies: see sunrise for full report

## 2024-06-07 NOTE — PROGRESS NOTE ADULT - SUBJECTIVE AND OBJECTIVE BOX
PROGRESS NOTE:   Marie Campoverde, DO  Hospitalist  Teams  After 5pm/weekends or if no answer ext: [phone]      Patient is a 38y old  Male who presents with a chief complaint of liver transplant workup (07 Jun 2024 11:08)      SUBJECTIVE / OVERNIGHT EVENTS:  More calm this morning, still on 1:1     ADDITIONAL REVIEW OF SYSTEMS:  No fever in > 24hrs  no n/v/d    MEDICATIONS  (STANDING):  amLODIPine   Tablet 5 milliGRAM(s) Oral daily  chlorhexidine 2% Cloths 1 Application(s) Topical <User Schedule>  fluconAZOLE IVPB      fluconAZOLE IVPB 400 milliGRAM(s) IV Intermittent every 24 hours  folic acid 1 milliGRAM(s) Oral daily  haloperidol     Tablet 5 milliGRAM(s) Oral two times a day  heparin   Injectable 5000 Unit(s) SubCutaneous every 12 hours  LORazepam     Tablet 6 milliGRAM(s) Oral every 6 hours  meropenem  IVPB      meropenem  IVPB 1000 milliGRAM(s) IV Intermittent every 8 hours  multivitamin 1 Tablet(s) Oral daily  nicotine - 21 mG/24Hr(s) Patch 1 Patch Transdermal daily  pantoprazole    Tablet 40 milliGRAM(s) Oral before breakfast  QUEtiapine 200 milliGRAM(s) Oral at bedtime  rifAXIMin 550 milliGRAM(s) Oral two times a day  thiamine 100 milliGRAM(s) Oral daily    MEDICATIONS  (PRN):  haloperidol    Injectable 5 milliGRAM(s) IntraMuscular every 6 hours PRN Agitation  LORazepam   Injectable 2 milliGRAM(s) IV Push every 2 hours PRN Agitation      CAPILLARY BLOOD GLUCOSE        I&O's Summary    06 Jun 2024 07:01  -  07 Jun 2024 07:00  --------------------------------------------------------  IN: 570 mL / OUT: 2075 mL / NET: -1505 mL    07 Jun 2024 07:01  -  07 Jun 2024 12:27  --------------------------------------------------------  IN: 240 mL / OUT: 0 mL / NET: 240 mL        PHYSICAL EXAM:  Vital Signs Last 24 Hrs  T(C): 36.2 (07 Jun 2024 12:07), Max: 37.1 (06 Jun 2024 15:00)  T(F): 97.2 (07 Jun 2024 12:07), Max: 98.7 (06 Jun 2024 15:00)  HR: 89 (07 Jun 2024 12:07) (89 - 111)  BP: 110/70 (07 Jun 2024 12:07) (98/57 - 145/83)  BP(mean): 86 (06 Jun 2024 18:00) (70 - 86)  RR: 18 (07 Jun 2024 12:07) (18 - 31)  SpO2: 97% (07 Jun 2024 12:07) (92% - 97%)    Parameters below as of 07 Jun 2024 06:23  Patient On (Oxygen Delivery Method): room air        CONSTITUTIONAL: NAD, well-developed  ABDOMEN: Nontender to palpation, normoactive bowel sounds, no rebound/guarding; No hepatosplenomegaly  MUSCLOSKELETAL: no clubbing or cyanosis of digits; no joint swelling or tenderness to palpation  PSYCH: A+O to person, place, and time   LABS:                        11.5   10.28 )-----------( 159      ( 07 Jun 2024 07:15 )             32.8     06-07    135  |  101  |  16  ----------------------------<  69<L>  4.0   |  21<L>  |  0.69    Ca    9.1      07 Jun 2024 07:00  Phos  2.0     06-07  Mg     2.3     06-07    TPro  7.5  /  Alb  3.3  /  TBili  16.7<H>  /  DBili  x   /  AST  92<H>  /  ALT  198<H>  /  AlkPhos  268<H>  06-07    PT/INR - ( 07 Jun 2024 07:14 )   PT: 12.6 sec;   INR: 1.21 ratio         PTT - ( 06 Jun 2024 01:08 )  PTT:33.8 sec      Urinalysis Basic - ( 07 Jun 2024 07:00 )    Color: x / Appearance: x / SG: x / pH: x  Gluc: 69 mg/dL / Ketone: x  / Bili: x / Urobili: x   Blood: x / Protein: x / Nitrite: x   Leuk Esterase: x / RBC: x / WBC x   Sq Epi: x / Non Sq Epi: x / Bacteria: x        Culture - Blood (collected 06 Jun 2024 00:50)  Source: .Blood Blood  Preliminary Report (07 Jun 2024 04:01):    No growth at 24 hours    Culture - Blood (collected 06 Jun 2024 00:45)  Source: .Blood Blood  Preliminary Report (07 Jun 2024 04:01):    No growth at 24 hours    Culture - Blood (collected 04 Jun 2024 18:54)  Source: .Blood Blood-Peripheral  Preliminary Report (06 Jun 2024 22:02):    No growth at 48 Hours        RADIOLOGY & ADDITIONAL TESTS:  Results Reviewed:   Imaging Personally Reviewed:  Electrocardiogram Personally Reviewed:    COORDINATION OF CARE:  Care Discussed with Consultants/Other Providers [Y/N]:  Prior or Outpatient Records Reviewed [Y/N]:

## 2024-06-07 NOTE — PROGRESS NOTE ADULT - ASSESSMENT
38yoM hx HTN, ETOH use (daily drinker), PSH gastric sleeve, [facility] and Acetaminophen in acute alcohol withdrawal.

## 2024-06-07 NOTE — BH CONSULTATION LIAISON PROGRESS NOTE - NSBHASSESSMENTFT_PSY_ALL_CORE
38-year-old, , , male, with PPHx of AUD, with no past psychiatric admissions, with PMH HTN, PSH gastric sleeve, initially admitted to [facility] for jaundice and confusion, found to have elevated transaminases to 4000s with high MELD and withdrawal symptoms, transferred to [facility] for liver transplant workup. Patient seen by transplant psychiatry as part of liver transplant evaluation.    5/31:Patient seen at bedside with nursing staff in the room, on exam patient was A/Ox1/2, hypersomnolent, with slurred speech, arousable only to physical stimuli. Patient noted that he still responding to internal stimuli on exam, noting that he experiencing AH and tactile while on Precedex at this time, and is still actively withdrawing from alcohol. Nursing staff at bedside noted that patient has been agitated, requiring increase in Precedex and multiple doses of PRN Ativan, and has received 10mg of Ativan in a 24 hour period at this time. Patient may benefit from phenobarbital use for acute withdrawal.   6/3: Patient on exam, continues to be agitated, responding to internal stimuli, A/Ox1, while receiving 12mg of Ativan as part of CIWA taper.    6/6:Patent on exam hypersomnolent, patient continues to experience withdrawal from alcohol at this time.      Plan  -Standing and Symptom-triggered CIWA as per primary team (Change route to IM/IV)  -Can consider using Haldol 5mg PO/IV/IM Q 6 HRs for acute agitation, monitor if QTC<500   -Consider Haldol 5 mg BID PO HS to aid with mood stabilization in lieu of Seroquel use   - Seroquel to be tapered to discontinuation by primary team ( dose halved on 6/7, with d/c thereafter if tolerated)  -behavioral disturbance likely in setting of withdrawal, please use Ativan prior to use of antipsychotics for further deterioration  -SIPAT pending further education about transplant process - Transplant evaluation has been held at this time  -High dose IV Thiamine supplementation  -B12, folate, TSH w/ FT4, PETH >400 (5/29)  ***-Consider switch to use of phenobarbital in lieu of Ativan use w/ consistently elevated CIWA scores, as patient continues to be in withdrawal after receiving increased Ativan Taper******

## 2024-06-07 NOTE — PROGRESS NOTE ADULT - PROBLEM SELECTOR PLAN 1
- Continue standing PO ativan for now; can switch to IV/IM if not tolerating PO TAPER NEEDS TO BE MANUALLY ADJUSTED AS CIWAS REMAINED HIGH ON STANDARD TAPER  6/7- Ativan 6mg PO Q6hrs    - PRN IV ativan also ordered for acute agitation/increase in CIWA  - CIWAs currently 4-5  -  recs appreciated  and discussed with attending:  Will wean off Seroquel (if stopped suddenly concern for hypotension)  MANUALLY WEAN OFF  Seroquel 200am and 400PM 6/7  Seroquel 100mg and 200pm 6/8  TAPER ON 6/9 then off on 6/10  Added Haldol 5mg PO BID per my discussion with psychiatry attending with PRN for severe agitation. last Qtc 417, will order repeat. Hold parameters for QTC > 500 in place     - Continue thiamine, folate, MV supplementation  - Continue constant observation for now- if remains calm today can DC this.  Psychiatry not requiring 1:1, it is for agitation nursing/patient safety. - Continue standing PO ativan for now; can switch to IV/IM if not tolerating PO TAPER NEEDS TO BE MANUALLY ADJUSTED AS CIWAS REMAINED HIGH ON STANDARD TAPER  6/7- Ativan 4mg IV Q6hrs (not reliably taking PO, sometimes hides it)    - PRN IV ativan also ordered for acute agitation/increase in CIWA  - CIWAs currently 4-5  -  recs appreciated  and discussed with attending:  Will wean off Seroquel (if stopped suddenly concern for hypotension)  MANUALLY WEAN OFF  Seroquel 200am and 400PM 6/7  Seroquel 100mg and 200pm 6/8  TAPER ON 6/9 then off on 6/10  Added Haldol 5mg PO BID (will do 2.5mg IV since he is not reliably taking his PO meds)  per my discussion with psychiatry attending with PRN for severe agitation. last Qtc 417, will order repeat. Hold parameters for QTC > 500 in place     - Continue thiamine, folate, MV supplementation  - Continue constant observation for now- if remains calm today can DC this.  Psychiatry not requiring 1:1, it is for agitation nursing/patient safety.

## 2024-06-07 NOTE — PROGRESS NOTE ADULT - NSPROGADDITIONALINFOA_GEN_ALL_CORE
hypoglycemic on am bmp- POC ordered. Awake and Alert answering questions  Discussed with psych attending- changed seroquel to wean off over next 2 days and added Haldol BID standing and PRN (EKG ordered).  NEEDS MANUAL ativan taper which needs to be adjusted daily based on his response- too agitated on standing taper so increased from ativan 4mg PO Q6hrs to 6mg PO q6hrs today  Can DC 1:1 if pt remains calm today- psych does not need this  Continue to follow hep recommendations hypoglycemic on am bmp- POC ordered. Awake and Alert answering questions  Discussed with psych attending- changed seroquel to wean off over next 2 days and added Haldol BID standing and PRN (EKG ordered).  NEEDS MANUAL ativan taper which needs to be adjusted daily based on his response- too agitated on standing taper so increased from ativan 4mg PO Q6hrs to 6mg PO q6hrs today  Can DC 1:1 if pt remains calm today- psych does not need this  Continue to follow hep recommendations    Spoke with nurse who said pt has not been taking Seroquel - pretents to take it but then spits it out later or hides it. So given this, will change his ativan and Haldol to IV with reduced doses.

## 2024-06-07 NOTE — PROGRESS NOTE ADULT - PROBLEM SELECTOR PLAN 5
DVT ppx - heparin subq  Diet - regular  GI - on PO protonix   - has cam;  will TOV 6/7  Dispo - pending

## 2024-06-07 NOTE — PROVIDER CONTACT NOTE (OTHER) - RECOMMENDATIONS
SICU team aware on rounds, hold medication for now for patient safety.
continue to monitor and provider notification

## 2024-06-07 NOTE — PROVIDER CONTACT NOTE (OTHER) - SITUATION
heart rate 107 ,pt on ciwa maintained as 6 ,on ativan taper
Patient unable to tolerate PO medication due to lethargy.

## 2024-06-07 NOTE — PROGRESS NOTE ADULT - ASSESSMENT
38M PMH HTN, ETOH use (daily drinker) PSH gastric sleeve, initially admitted to [facility] for jaundice and confusion, found to have elevated transaminases to 4000s with high MELD and withdrawal symptoms, transferred to [facility] for liver transplant workup.     #Acute liver failure in the setting of decompensated alcoholic cirrhosis, improving  -MELD 23 O. Presented w/ elevated liver enzymes, predominantly hepatocellular pattern, now AST/ALT trending down, w/ bili rising. INR has been slowly trending down. Ammonia level stable at 62.   - hepatitis B surface antibody, hepatitis B surface antigen, hepatitis core antibody, hepatitis C NR   - EV: No prior EGD.   - CT chest/ A/P with diffuse, marked hepatic steatosis   - CT head with no acute intracranial hemorrhage, mass effect or midline shift.  - MERLY negative, anti-mitochondrial antibody <1:20, anti-smooth muscle antibody <1:20, immunoglobulins (IgG 1022, IgM 122, IgA quantitative 502) for autoimmune etiologies. Utox neg.     Recommendations:  -trend clinical symptoms, exam findings, vital signs, CBC, CMP, INR  -f/u infectious workup; continued on fluconazole and meropenem   - recs appreciated   -rifaximin 550mg BID and lactulose which should be uptitrated to target 3-5 daily BMs    Note incomplete until finalized by attending signature/attestation.    Dimitry Santos  GI/Hepatology Fellow    MONDAY-FRIDAY 8AM-5PM:  Pager# [phone] ([facility]) or [phone] ([facility])    NON-URGENT CONSULTS:  Please email [email or web address]  AT NIGHT AND ON WEEKENDS:  Contact on-call GI fellow from 5pm-8am and on weekends/holidays

## 2024-06-07 NOTE — PROVIDER CONTACT NOTE (OTHER) - ACTION/TREATMENT ORDERED:
Allow patient more time to wake up independently, may require NGT placement. Continue to monitor and trend CIWA scores hourly.
no new orders

## 2024-06-07 NOTE — PROVIDER CONTACT NOTE (OTHER) - ASSESSMENT
pt Aox2 ,asymptomatic ,
Patient most recent RASS -2, patient remains lethargic s/p ativan administration earlier this morning. Patient due for PO lactulose, but unable to tolerate d/t increased lethargy and risk for aspiration.

## 2024-06-08 LAB
IGNF NEG CNTRL BLD: SIGNIFICANT CHANGE UP
M TB IFN-G BLD-IMP: NEGATIVE — SIGNIFICANT CHANGE UP
MITOGEN IGNF BCKGRD COR BLD-ACNC: 0 — SIGNIFICANT CHANGE UP
MITOGEN IGNF BCKGRD COR BLD-ACNC: 0 — SIGNIFICANT CHANGE UP
MITOGEN IGNF.SPOT COUNT BLD: SIGNIFICANT CHANGE UP

## 2024-06-08 PROCEDURE — 93010 ELECTROCARDIOGRAM REPORT: CPT

## 2024-06-08 PROCEDURE — 99232 SBSQ HOSP IP/OBS MODERATE 35: CPT

## 2024-06-08 RX ORDER — HALOPERIDOL DECANOATE 100 MG/ML
5 INJECTION INTRAMUSCULAR AT BEDTIME
Refills: 0 | Status: DISCONTINUED | OUTPATIENT
Start: 2024-06-08 | End: 2024-06-11

## 2024-06-08 RX ORDER — THIAMINE MONONITRATE (VIT B1) 100 MG
250 TABLET ORAL DAILY
Refills: 0 | Status: DISCONTINUED | OUTPATIENT
Start: 2024-06-08 | End: 2024-06-11

## 2024-06-08 RX ORDER — HEPATITIS B VIRUS VACCINE,RECB 20 MCG/ML
20 VIAL (ML) INTRAMUSCULAR ONCE
Refills: 0 | Status: COMPLETED | OUTPATIENT
Start: 2024-06-08 | End: 2024-06-08

## 2024-06-08 RX ORDER — QUETIAPINE FUMARATE 200 MG/1
50 TABLET, FILM COATED ORAL DAILY
Refills: 0 | Status: DISCONTINUED | OUTPATIENT
Start: 2024-06-08 | End: 2024-06-09

## 2024-06-08 RX ORDER — POTASSIUM PHOSPHATE, MONOBASIC POTASSIUM PHOSPHATE, DIBASIC 236; 224 MG/ML; MG/ML
15 INJECTION, SOLUTION INTRAVENOUS ONCE
Refills: 0 | Status: COMPLETED | OUTPATIENT
Start: 2024-06-08 | End: 2024-06-08

## 2024-06-08 RX ORDER — LACTULOSE 10 G/15ML
20 SOLUTION ORAL THREE TIMES A DAY
Refills: 0 | Status: DISCONTINUED | OUTPATIENT
Start: 2024-06-08 | End: 2024-06-09

## 2024-06-08 RX ORDER — QUETIAPINE FUMARATE 200 MG/1
100 TABLET, FILM COATED ORAL AT BEDTIME
Refills: 0 | Status: DISCONTINUED | OUTPATIENT
Start: 2024-06-08 | End: 2024-06-09

## 2024-06-08 RX ADMIN — HALOPERIDOL DECANOATE 5 MILLIGRAM(S): 100 INJECTION INTRAMUSCULAR at 23:56

## 2024-06-08 RX ADMIN — HALOPERIDOL DECANOATE 2.5 MILLIGRAM(S): 100 INJECTION INTRAMUSCULAR at 06:18

## 2024-06-08 RX ADMIN — MEROPENEM 100 MILLIGRAM(S): 1 INJECTION INTRAVENOUS at 13:18

## 2024-06-08 RX ADMIN — Medication 4 MILLIGRAM(S): at 00:12

## 2024-06-08 RX ADMIN — LACTULOSE 20 GRAM(S): 10 SOLUTION ORAL at 21:51

## 2024-06-08 RX ADMIN — Medication 4 MILLIGRAM(S): at 06:35

## 2024-06-08 RX ADMIN — FLUCONAZOLE 100 MILLIGRAM(S): 150 TABLET ORAL at 10:09

## 2024-06-08 RX ADMIN — Medication 20 MICROGRAM(S): at 17:06

## 2024-06-08 RX ADMIN — Medication 1 MILLIGRAM(S): at 13:19

## 2024-06-08 RX ADMIN — Medication 1 PATCH: at 13:28

## 2024-06-08 RX ADMIN — PANTOPRAZOLE SODIUM 40 MILLIGRAM(S): 20 TABLET, DELAYED RELEASE ORAL at 06:20

## 2024-06-08 RX ADMIN — QUETIAPINE FUMARATE 100 MILLIGRAM(S): 200 TABLET, FILM COATED ORAL at 21:51

## 2024-06-08 RX ADMIN — HALOPERIDOL DECANOATE 5 MILLIGRAM(S): 100 INJECTION INTRAMUSCULAR at 17:32

## 2024-06-08 RX ADMIN — Medication 2 MILLIGRAM(S): at 13:18

## 2024-06-08 RX ADMIN — Medication 1 TABLET(S): at 13:19

## 2024-06-08 RX ADMIN — CHLORHEXIDINE GLUCONATE 1 APPLICATION(S): 213 SOLUTION TOPICAL at 06:21

## 2024-06-08 RX ADMIN — Medication 2 MILLIGRAM(S): at 23:56

## 2024-06-08 RX ADMIN — HALOPERIDOL DECANOATE 5 MILLIGRAM(S): 100 INJECTION INTRAMUSCULAR at 21:51

## 2024-06-08 RX ADMIN — MEROPENEM 100 MILLIGRAM(S): 1 INJECTION INTRAVENOUS at 06:19

## 2024-06-08 RX ADMIN — POTASSIUM PHOSPHATE, MONOBASIC POTASSIUM PHOSPHATE, DIBASIC 62.5 MILLIMOLE(S): 236; 224 INJECTION, SOLUTION INTRAVENOUS at 17:04

## 2024-06-08 RX ADMIN — Medication 1 PATCH: at 03:17

## 2024-06-08 RX ADMIN — HEPARIN SODIUM 5000 UNIT(S): 5000 INJECTION INTRAVENOUS; SUBCUTANEOUS at 17:05

## 2024-06-08 RX ADMIN — Medication 2 MILLIGRAM(S): at 17:04

## 2024-06-08 RX ADMIN — AMLODIPINE BESYLATE 5 MILLIGRAM(S): 2.5 TABLET ORAL at 06:20

## 2024-06-08 RX ADMIN — Medication 1 PATCH: at 08:00

## 2024-06-08 RX ADMIN — QUETIAPINE FUMARATE 50 MILLIGRAM(S): 200 TABLET, FILM COATED ORAL at 13:19

## 2024-06-08 RX ADMIN — LACTULOSE 20 GRAM(S): 10 SOLUTION ORAL at 13:18

## 2024-06-08 RX ADMIN — HEPARIN SODIUM 5000 UNIT(S): 5000 INJECTION INTRAVENOUS; SUBCUTANEOUS at 06:19

## 2024-06-08 RX ADMIN — Medication 250 MILLIGRAM(S): at 13:18

## 2024-06-08 NOTE — PROGRESS NOTE ADULT - PROBLEM SELECTOR PLAN 1
- Continue standing PO ativan for now; can switch to IV/IM if not tolerating PO TAPER NEEDS TO BE MANUALLY ADJUSTED AS CIWAS REMAINED HIGH ON STANDARD TAPER  6/7- Ativan 4mg IV Q6hrs (not reliably taking PO, sometimes hides it)    - PRN IV ativan also ordered for acute agitation/increase in CIWA  - per psych--> Will wean off Seroquel (if stopped suddenly concern for hypotension)    -Standing and Symptom-triggered CIWA as per primary team (Change route to IM/IV)  -Can consider using Haldol 5mg PO/IV/IM Q 6 HRs for acute agitation, monitor if QTC<500   -Consider Haldol 5 mg BID PO HS to aid with mood stabilization in lieu of Seroquel use   - Seroquel to be tapered to discontinuation by primary team ( dose halved on 6/7, with d/c thereafter if tolerated)  MANUALLY WEAN OFF  Seroquel 50mg [id number] pm 6/8--> f/u psych recs for tomorrow  TAPER ON 6/9 then off on 6/10  Haldol 5mg PO qhs; haldol 5mg IV q6hr prn agitation; last Qtc 417, will order repeat for this morning Hold parameters for QTC > 500 in place     - high dose thiamine, folate, MV supplementation  - Continue constant observation for now-Psychiatry not requiring 1:1, it is for agitation nursing/patient safety.

## 2024-06-08 NOTE — PROGRESS NOTE ADULT - SUBJECTIVE AND OBJECTIVE BOX
Patient is a 38y old  Male who presents with a chief complaint of liver transplant workup (07 Jun 2024 12:27)      SUBJECTIVE / OVERNIGHT EVENTS:    feels well just really sleepy. had BMs    ROS:  14 point ROS negative in detail except stated as above    MEDICATIONS  (STANDING):  amLODIPine   Tablet 5 milliGRAM(s) Oral daily  chlorhexidine 2% Cloths 1 Application(s) Topical <User Schedule>  fluconAZOLE IVPB 400 milliGRAM(s) IV Intermittent every 24 hours  fluconAZOLE IVPB      folic acid 1 milliGRAM(s) Oral daily  haloperidol     Tablet 5 milliGRAM(s) Oral at bedtime  heparin   Injectable 5000 Unit(s) SubCutaneous every 12 hours  lactulose Syrup 20 Gram(s) Oral three times a day  LORazepam   Injectable 2 milliGRAM(s) IV Push every 6 hours  meropenem  IVPB      meropenem  IVPB 1000 milliGRAM(s) IV Intermittent every 8 hours  multivitamin 1 Tablet(s) Oral daily  nicotine - 21 mG/24Hr(s) Patch 1 Patch Transdermal daily  pantoprazole    Tablet 40 milliGRAM(s) Oral before breakfast  QUEtiapine 100 milliGRAM(s) Oral at bedtime  QUEtiapine 50 milliGRAM(s) Oral daily  rifAXIMin 550 milliGRAM(s) Oral two times a day  thiamine Injectable 250 milliGRAM(s) IV Push daily    MEDICATIONS  (PRN):  haloperidol    Injectable 5 milliGRAM(s) IV Push every 6 hours PRN Agitation  LORazepam   Injectable 2 milliGRAM(s) IV Push every 2 hours PRN Agitation      CAPILLARY BLOOD GLUCOSE        I&O's Summary    07 Jun 2024 07:01  -  08 Jun 2024 07:00  --------------------------------------------------------  IN: 600 mL / OUT: 1500 mL / NET: -900 mL        PHYSICAL EXAM:  Vital Signs Last 24 Hrs  T(C): 37.3 (08 Jun 2024 05:00), Max: 37.3 (08 Jun 2024 05:00)  T(F): 99.2 (08 Jun 2024 05:00), Max: 99.2 (08 Jun 2024 05:00)  HR: 77 (08 Jun 2024 05:00) (74 - 89)  BP: 128/78 (08 Jun 2024 05:00) (110/70 - 134/82)  BP(mean): --  RR: 18 (08 Jun 2024 05:00) (18 - 18)  SpO2: 99% (08 Jun 2024 05:00) (97% - 100%)    Parameters below as of 08 Jun 2024 05:00  Patient On (Oxygen Delivery Method): room air    Vital Signs Last 24 Hrs  T(C): 37.3 (08 Jun 2024 05:00), Max: 37.3 (08 Jun 2024 05:00)  T(F): 99.2 (08 Jun 2024 05:00), Max: 99.2 (08 Jun 2024 05:00)  HR: 77 (08 Jun 2024 05:00) (74 - 89)  BP: 128/78 (08 Jun 2024 05:00) (110/70 - 134/82)  BP(mean): --  RR: 18 (08 Jun 2024 05:00) (18 - 18)  SpO2: 99% (08 Jun 2024 05:00) (97% - 100%)    Parameters below as of 08 Jun 2024 05:00  Patient On (Oxygen Delivery Method): room air        CONSTITUTIONAL: NAD, well-developed, well-groomed  EYES: PERRL; conjunctiva and sclera clear  ENMT: Moist oral mucosa, no pharyngeal injection or exudates; normal dentition  NECK: Supple, no palpable masses; no thyromegaly  RESPIRATORY: Normal respiratory effort; lungs are clear to auscultation bilaterally  CARDIOVASCULAR: Regular rate and rhythm, normal S1 and S2, no murmur/rub/gallop; No lower extremity edema; Peripheral pulses are 2+ bilaterally  ABDOMEN: Nontender to palpation, normoactive bowel sounds, no rebound/guarding; No hepatosplenomegaly  MUSCULOSKELETAL:  Normal gait; no clubbing or cyanosis of digits; no joint swelling or tenderness to palpation  PSYCH: A+O to person, place, and time; affect appropriate  NEUROLOGY: CN 2-12 are intact and symmetric; no gross sensory deficits   SKIN: No rashes; no palpable lesions  LABS:                        11.5   10.28 )-----------( 159      ( 07 Jun 2024 07:15 )             32.8     06-07    135  |  101  |  16  ----------------------------<  69<L>  4.0   |  21<L>  |  0.69    Ca    9.1      07 Jun 2024 07:00  Phos  2.0     06-07  Mg     2.3     06-07    TPro  7.5  /  Alb  3.3  /  TBili  16.7<H>  /  DBili  x   /  AST  92<H>  /  ALT  198<H>  /  AlkPhos  268<H>  06-07    PT/INR - ( 07 Jun 2024 07:14 )   PT: 12.6 sec;   INR: 1.21 ratio               Urinalysis Basic - ( 07 Jun 2024 07:00 )    Color: x / Appearance: x / SG: x / pH: x  Gluc: 69 mg/dL / Ketone: x  / Bili: x / Urobili: x   Blood: x / Protein: x / Nitrite: x   Leuk Esterase: x / RBC: x / WBC x   Sq Epi: x / Non Sq Epi: x / Bacteria: x        RADIOLOGY & ADDITIONAL TESTS:    Imaging Personally Reviewed:    Consultant(s) Notes Reviewed:      Care Discussed with Consultants/Other Providers:  franny Carlson

## 2024-06-08 NOTE — PROGRESS NOTE ADULT - ASSESSMENT
38yoM hx HTN, ETOH use (daily drinker), PSH gastric sleeve, presenting [facility] ETOH and Acetaminophen in acute alcohol withdrawal.

## 2024-06-08 NOTE — PROGRESS NOTE ADULT - SUBJECTIVE AND OBJECTIVE BOX
INFECTIOUS DISEASES FOLLOW UP-- Dee Camacho  [phone]    This is a follow up note for this  38yMale with  Acute or subacute hepatic failure without coma        ROS:  CONSTITUTIONAL:  No fever, good appetite  CARDIOVASCULAR:  No chest pain or palpitations  RESPIRATORY:  No dyspnea  GASTROINTESTINAL:  No nausea, vomiting, diarrhea, or abdominal pain  GENITOURINARY:  No dysuria  NEUROLOGIC:  No headache,     Allergies    No Known Allergies    Intolerances        ANTIBIOTICS/RELEVANT:  antimicrobials  fluconAZOLE IVPB 400 milliGRAM(s) IV Intermittent every 24 hours  fluconAZOLE IVPB      meropenem  IVPB      meropenem  IVPB 1000 milliGRAM(s) IV Intermittent every 8 hours  rifAXIMin 550 milliGRAM(s) Oral two times a day    immunologic:    OTHER:  amLODIPine   Tablet 5 milliGRAM(s) Oral daily  chlorhexidine 2% Cloths 1 Application(s) Topical <User Schedule>  folic acid 1 milliGRAM(s) Oral daily  haloperidol     Tablet 5 milliGRAM(s) Oral at bedtime  haloperidol    Injectable 5 milliGRAM(s) IV Push every 6 hours PRN  heparin   Injectable 5000 Unit(s) SubCutaneous every 12 hours  lactulose Syrup 20 Gram(s) Oral three times a day  LORazepam   Injectable 2 milliGRAM(s) IV Push every 6 hours  multivitamin 1 Tablet(s) Oral daily  nicotine - 21 mG/24Hr(s) Patch 1 Patch Transdermal daily  pantoprazole    Tablet 40 milliGRAM(s) Oral before breakfast  potassium phosphate IVPB 15 milliMole(s) IV Intermittent once  QUEtiapine 100 milliGRAM(s) Oral at bedtime  QUEtiapine 50 milliGRAM(s) Oral daily  thiamine Injectable 250 milliGRAM(s) IV Push daily      Objective:  Vital Signs Last 24 Hrs  T(C): 36.7 (08 Jun 2024 13:45), Max: 37.3 (08 Jun 2024 05:00)  T(F): 98.1 (08 Jun 2024 13:45), Max: 99.2 (08 Jun 2024 05:00)  HR: 86 (08 Jun 2024 13:45) (74 - 86)  BP: 111/72 (08 Jun 2024 13:45) (111/72 - 134/82)  BP(mean): --  RR: 18 (08 Jun 2024 13:45) (18 - 18)  SpO2: 95% (08 Jun 2024 13:45) (95% - 100%)    Parameters below as of 08 Jun 2024 13:45  Patient On (Oxygen Delivery Method): room air        PHYSICAL EXAM:  Constitutional:no acute distress  Eyes:TEO, EOMI  Ear/Nose/Throat: no oral lesions, 	  Respiratory: clear BL  Cardiovascular: S1S2  Gastrointestinal:soft, (+) BS, no tenderness  Extremities:no e/e/c  No Lymphadenopathy  IV sites not inflammed.    LABS:                        11.5   10.28 )-----------( 159      ( 07 Jun 2024 07:15 )             32.8     06-07    135  |  101  |  16  ----------------------------<  69<L>  4.0   |  21<L>  |  0.69    Ca    9.1      07 Jun 2024 07:00  Phos  2.0     06-07  Mg     2.3     06-07    TPro  7.5  /  Alb  3.3  /  TBili  16.7<H>  /  DBili  x   /  AST  92<H>  /  ALT  198<H>  /  AlkPhos  268<H>  06-07    PT/INR - ( 07 Jun 2024 07:14 )   PT: 12.6 sec;   INR: 1.21 ratio           Urinalysis Basic - ( 07 Jun 2024 07:00 )    Color: x / Appearance: x / SG: x / pH: x  Gluc: 69 mg/dL / Ketone: x  / Bili: x / Urobili: x   Blood: x / Protein: x / Nitrite: x   Leuk Esterase: x / RBC: x / WBC x   Sq Epi: x / Non Sq Epi: x / Bacteria: x        MICROBIOLOGY:            RECENT CULTURES:  06-06 @ 00:50  .Blood Blood  --  --  --    No growth at 48 Hours  --  06-06 @ 00:45  .Blood Blood  --  --  --    No growth at 48 Hours  --  06-04 @ 18:54  .Blood Blood-Peripheral  --  --  --    No growth at 72 Hours  --  06-04 @ 10:05  .Blood Blood-Peripheral  --  --  --    No growth at 72 Hours  --  06-02 @ 09:30  .Blood Blood  --  --  --    No growth at 5 days  --  06-02 @ 09:15  .Blood Blood  --  --  --    No growth at 5 days  --      RADIOLOGY & ADDITIONAL STUDIES:    < from: US Abdomen Upper Quadrant Right (06.05.24 @ 15:15) >  IMPRESSION:  No biliary ductal dilatation.    No cholecystitis.    Diffuse hepatic steatosis as seen on CT 5/29/2024.    < end of copied text >   INFECTIOUS DISEASES FOLLOW UP-- Dee Camacho  [phone]    This is a follow up note for this  38yMale with  Acute or subacute hepatic failure without coma  remains on 1:1 for safety      ROS:  CONSTITUTIONAL:  No fever, good appetite  CARDIOVASCULAR:  No chest pain or palpitations  RESPIRATORY:  No dyspnea  GASTROINTESTINAL:  No nausea, vomiting, diarrhea, or abdominal pain  GENITOURINARY:  No dysuria  NEUROLOGIC:  No headache,     Allergies    No Known Allergies    Intolerances        ANTIBIOTICS/RELEVANT:  antimicrobials  fluconAZOLE IVPB 400 milliGRAM(s) IV Intermittent every 24 hours  fluconAZOLE IVPB      meropenem  IVPB      meropenem  IVPB 1000 milliGRAM(s) IV Intermittent every 8 hours  rifAXIMin 550 milliGRAM(s) Oral two times a day    immunologic:    OTHER:  amLODIPine   Tablet 5 milliGRAM(s) Oral daily  chlorhexidine 2% Cloths 1 Application(s) Topical <User Schedule>  folic acid 1 milliGRAM(s) Oral daily  haloperidol     Tablet 5 milliGRAM(s) Oral at bedtime  haloperidol    Injectable 5 milliGRAM(s) IV Push every 6 hours PRN  heparin   Injectable 5000 Unit(s) SubCutaneous every 12 hours  lactulose Syrup 20 Gram(s) Oral three times a day  LORazepam   Injectable 2 milliGRAM(s) IV Push every 6 hours  multivitamin 1 Tablet(s) Oral daily  nicotine - 21 mG/24Hr(s) Patch 1 Patch Transdermal daily  pantoprazole    Tablet 40 milliGRAM(s) Oral before breakfast  potassium phosphate IVPB 15 milliMole(s) IV Intermittent once  QUEtiapine 100 milliGRAM(s) Oral at bedtime  QUEtiapine 50 milliGRAM(s) Oral daily  thiamine Injectable 250 milliGRAM(s) IV Push daily      Objective:  Vital Signs Last 24 Hrs  T(C): 36.7 (08 Jun 2024 13:45), Max: 37.3 (08 Jun 2024 05:00)  T(F): 98.1 (08 Jun 2024 13:45), Max: 99.2 (08 Jun 2024 05:00)  HR: 86 (08 Jun 2024 13:45) (74 - 86)  BP: 111/72 (08 Jun 2024 13:45) (111/72 - 134/82)  BP(mean): --  RR: 18 (08 Jun 2024 13:45) (18 - 18)  SpO2: 95% (08 Jun 2024 13:45) (95% - 100%)    Parameters below as of 08 Jun 2024 13:45  Patient On (Oxygen Delivery Method): room air        PHYSICAL EXAM:  Constitutional:no acute distress  Eyes:TEO, EOMI  Ear/Nose/Throat: no oral lesions, 	  Respiratory: clear BL  Cardiovascular: S1S2  Gastrointestinal:soft, (+) BS, no tenderness  Extremities:no e/e/c  No Lymphadenopathy  IV sites not inflammed.    LABS:                        11.5   10.28 )-----------( 159      ( 07 Jun 2024 07:15 )             32.8     06-07    135  |  101  |  16  ----------------------------<  69<L>  4.0   |  21<L>  |  0.69    Ca    9.1      07 Jun 2024 07:00  Phos  2.0     06-07  Mg     2.3     06-07    TPro  7.5  /  Alb  3.3  /  TBili  16.7<H>  /  DBili  x   /  AST  92<H>  /  ALT  198<H>  /  AlkPhos  268<H>  06-07    PT/INR - ( 07 Jun 2024 07:14 )   PT: 12.6 sec;   INR: 1.21 ratio           Urinalysis Basic - ( 07 Jun 2024 07:00 )    Color: x / Appearance: x / SG: x / pH: x  Gluc: 69 mg/dL / Ketone: x  / Bili: x / Urobili: x   Blood: x / Protein: x / Nitrite: x   Leuk Esterase: x / RBC: x / WBC x   Sq Epi: x / Non Sq Epi: x / Bacteria: x        MICROBIOLOGY:            RECENT CULTURES:  06-06 @ 00:50  .Blood Blood  --  --  --    No growth at 48 Hours  --  06-06 @ 00:45  .Blood Blood  --  --  --    No growth at 48 Hours  --  06-04 @ 18:54  .Blood Blood-Peripheral  --  --  --    No growth at 72 Hours  --  06-04 @ 10:05  .Blood Blood-Peripheral  --  --  --    No growth at 72 Hours  --  06-02 @ 09:30  .Blood Blood  --  --  --    No growth at 5 days  --  06-02 @ 09:15  .Blood Blood  --  --  --    No growth at 5 days  --      RADIOLOGY & ADDITIONAL STUDIES:    < from: US Abdomen Upper Quadrant Right (06.05.24 @ 15:15) >  IMPRESSION:  No biliary ductal dilatation.    No cholecystitis.    Diffuse hepatic steatosis as seen on CT 5/29/2024.    < end of copied text >

## 2024-06-08 NOTE — PROGRESS NOTE ADULT - ASSESSMENT
38M PMH HTN, ETOH use (daily drink) PSH gastric sleeve, initially admitted to [facility] for jaundice and confusion, found to have elevated transaminases to 4000s with high MELD and withdrawal symptoms, transferred to [facility] for liver transplant workup.     MRSA/MSSA nasal PCR (May 29) negative  Urine culture (May 29) no growth  Blood cultures (May 29) no growth to date    CT neck (May 29) Enlarged palatine tonsils without hyperemia or surrounding infiltrative changes.  CT chest (May 29) no focal consolidation  CT abdomen pelvis (May 29) Diffuse, marked hepatic steatosis, Question of mild gallbladder wall thickening  RUQ US (6/5): no cholecystitis    #Acute liver injury, transaminitis  Hepatitis A IgM (May 29) negative  Hepatitis B DNA by PCR (May 29) negative  Hepatitis C RNA (May 29) negative  CMV by PCR (May 29) negative  HSV 1/2 serum PCR Negative  Adenovirus serum PCR was inconclusive  Varicella DNA PCR negative  HSV PCR 1/2 is negative  Negative HIV viral load      #Preliver transplant evaluation  COVID19 Ortiz Antibody positive  COVID19 Nucleocapsid Antibody positive  HAV IgG positive  HBVs Ab Negative, HBVs Ag Negative, HBVc Ab negative, HBV PCR Negative  HCV Ab negative, HCV PCR Negative  HSV 1 IgG positive  HSV 2 IgG Equivocal  EBV IgG positive  CMV IgG positive  VZV IgG positive  Measles IgG negative, Mumps IgG negative, Rubella Equivocal  HIV Ag/Ab by CMIA negative  Toxoplasma IgG positive  Strongyloides Ab negative  Coccidioides antibody negative    --quantiferon indeterminate but T-spot negative  --No absolute ID contraindication for OLT listing  -- Follow-up on Trypanosoma cruzi Ab      #Leukocytosis and fever  resolved  blood cultures sent and no growth  can discontinue antibiotics and observe    #Encounter to Vaccinate Patient -   COVID19: Would benefit from COVID19 [id number] Vaccine Dose  Influenza: Will require with next season  Pneumococcal: Would benefit from PCV20  HAV: Immune, would not require further vaccination  HBV: Would benefit from Heplisav vaccination (ordered first dose)   MMR: Not mumps or rubeola immune, ideally would require dose of MMR but this would be a live vaccine  Varicella: Immune, would not require further vaccination  Shingles: Will require Shingrix  Tdap: Will require Tdap    Will not follow inpatient going forward    Can follow up in the office after discharge if transplant evaluation is pursued    Call if questions arise    Alvarez Camacho MD  Can be called via Teams  After 5pm/weekends [phone]

## 2024-06-09 LAB
ALBUMIN SERPL ELPH-MCNC: 3.1 G/DL — LOW (ref 3.3–5)
ALP SERPL-CCNC: 259 U/L — HIGH (ref 40–120)
ALT FLD-CCNC: 139 U/L — HIGH (ref 10–45)
ANION GAP SERPL CALC-SCNC: 14 MMOL/L — SIGNIFICANT CHANGE UP (ref 5–17)
APTT BLD: 34.7 SEC — SIGNIFICANT CHANGE UP (ref 24.5–35.6)
AST SERPL-CCNC: 98 U/L — HIGH (ref 10–40)
BASOPHILS # BLD AUTO: 0.14 K/UL — SIGNIFICANT CHANGE UP (ref 0–0.2)
BASOPHILS NFR BLD AUTO: 1.3 % — SIGNIFICANT CHANGE UP (ref 0–2)
BILIRUB SERPL-MCNC: 9 MG/DL — HIGH (ref 0.2–1.2)
BUN SERPL-MCNC: 15 MG/DL — SIGNIFICANT CHANGE UP (ref 7–23)
CALCIUM SERPL-MCNC: 9 MG/DL — SIGNIFICANT CHANGE UP (ref 8.4–10.5)
CHLORIDE SERPL-SCNC: 107 MMOL/L — SIGNIFICANT CHANGE UP (ref 96–108)
CO2 SERPL-SCNC: 19 MMOL/L — LOW (ref 22–31)
CREAT SERPL-MCNC: 0.64 MG/DL — SIGNIFICANT CHANGE UP (ref 0.5–1.3)
CULTURE RESULTS: SIGNIFICANT CHANGE UP
CULTURE RESULTS: SIGNIFICANT CHANGE UP
EGFR: 125 ML/MIN/1.73M2 — SIGNIFICANT CHANGE UP
EOSINOPHIL # BLD AUTO: 0.28 K/UL — SIGNIFICANT CHANGE UP (ref 0–0.5)
EOSINOPHIL NFR BLD AUTO: 2.6 % — SIGNIFICANT CHANGE UP (ref 0–6)
GLUCOSE SERPL-MCNC: 74 MG/DL — SIGNIFICANT CHANGE UP (ref 70–99)
HCT VFR BLD CALC: 33.5 % — LOW (ref 39–50)
HGB BLD-MCNC: 11.1 G/DL — LOW (ref 13–17)
IMM GRANULOCYTES NFR BLD AUTO: 3.8 % — HIGH (ref 0–0.9)
INR BLD: 1.11 RATIO — SIGNIFICANT CHANGE UP (ref 0.85–1.18)
LYMPHOCYTES # BLD AUTO: 19.7 % — SIGNIFICANT CHANGE UP (ref 13–44)
LYMPHOCYTES # BLD AUTO: 2.11 K/UL — SIGNIFICANT CHANGE UP (ref 1–3.3)
MCHC RBC-ENTMCNC: 32.1 PG — SIGNIFICANT CHANGE UP (ref 27–34)
MCHC RBC-ENTMCNC: 33.1 GM/DL — SIGNIFICANT CHANGE UP (ref 32–36)
MCV RBC AUTO: 96.8 FL — SIGNIFICANT CHANGE UP (ref 80–100)
MONOCYTES # BLD AUTO: 1.34 K/UL — HIGH (ref 0–0.9)
MONOCYTES NFR BLD AUTO: 12.5 % — SIGNIFICANT CHANGE UP (ref 2–14)
NEUTROPHILS # BLD AUTO: 6.43 K/UL — SIGNIFICANT CHANGE UP (ref 1.8–7.4)
NEUTROPHILS NFR BLD AUTO: 60.1 % — SIGNIFICANT CHANGE UP (ref 43–77)
NRBC # BLD: 0 /100 WBCS — SIGNIFICANT CHANGE UP (ref 0–0)
PLATELET # BLD AUTO: 230 K/UL — SIGNIFICANT CHANGE UP (ref 150–400)
POTASSIUM SERPL-MCNC: 3.9 MMOL/L — SIGNIFICANT CHANGE UP (ref 3.5–5.3)
POTASSIUM SERPL-SCNC: 3.9 MMOL/L — SIGNIFICANT CHANGE UP (ref 3.5–5.3)
PROT SERPL-MCNC: 7.9 G/DL — SIGNIFICANT CHANGE UP (ref 6–8.3)
PROTHROM AB SERPL-ACNC: 12.2 SEC — SIGNIFICANT CHANGE UP (ref 9.5–13)
RBC # BLD: 3.46 M/UL — LOW (ref 4.2–5.8)
RBC # FLD: 18 % — HIGH (ref 10.3–14.5)
SODIUM SERPL-SCNC: 140 MMOL/L — SIGNIFICANT CHANGE UP (ref 135–145)
SPECIMEN SOURCE: SIGNIFICANT CHANGE UP
SPECIMEN SOURCE: SIGNIFICANT CHANGE UP
WBC # BLD: 10.71 K/UL — HIGH (ref 3.8–10.5)
WBC # FLD AUTO: 10.71 K/UL — HIGH (ref 3.8–10.5)

## 2024-06-09 PROCEDURE — 99232 SBSQ HOSP IP/OBS MODERATE 35: CPT

## 2024-06-09 PROCEDURE — 93010 ELECTROCARDIOGRAM REPORT: CPT

## 2024-06-09 RX ORDER — QUETIAPINE FUMARATE 200 MG/1
25 TABLET, FILM COATED ORAL DAILY
Refills: 0 | Status: DISCONTINUED | OUTPATIENT
Start: 2024-06-09 | End: 2024-06-10

## 2024-06-09 RX ORDER — LACTULOSE 10 G/15ML
20 SOLUTION ORAL
Refills: 0 | Status: DISCONTINUED | OUTPATIENT
Start: 2024-06-09 | End: 2024-06-11

## 2024-06-09 RX ORDER — QUETIAPINE FUMARATE 200 MG/1
50 TABLET, FILM COATED ORAL AT BEDTIME
Refills: 0 | Status: DISCONTINUED | OUTPATIENT
Start: 2024-06-09 | End: 2024-06-10

## 2024-06-09 RX ADMIN — PANTOPRAZOLE SODIUM 40 MILLIGRAM(S): 20 TABLET, DELAYED RELEASE ORAL at 05:32

## 2024-06-09 RX ADMIN — Medication 250 MILLIGRAM(S): at 12:02

## 2024-06-09 RX ADMIN — HALOPERIDOL DECANOATE 5 MILLIGRAM(S): 100 INJECTION INTRAMUSCULAR at 21:30

## 2024-06-09 RX ADMIN — LACTULOSE 20 GRAM(S): 10 SOLUTION ORAL at 18:26

## 2024-06-09 RX ADMIN — AMLODIPINE BESYLATE 5 MILLIGRAM(S): 2.5 TABLET ORAL at 05:32

## 2024-06-09 RX ADMIN — Medication 2 MILLIGRAM(S): at 12:02

## 2024-06-09 RX ADMIN — HEPARIN SODIUM 5000 UNIT(S): 5000 INJECTION INTRAVENOUS; SUBCUTANEOUS at 05:32

## 2024-06-09 RX ADMIN — QUETIAPINE FUMARATE 25 MILLIGRAM(S): 200 TABLET, FILM COATED ORAL at 12:03

## 2024-06-09 RX ADMIN — LACTULOSE 20 GRAM(S): 10 SOLUTION ORAL at 12:02

## 2024-06-09 RX ADMIN — QUETIAPINE FUMARATE 50 MILLIGRAM(S): 200 TABLET, FILM COATED ORAL at 21:28

## 2024-06-09 RX ADMIN — CHLORHEXIDINE GLUCONATE 1 APPLICATION(S): 213 SOLUTION TOPICAL at 05:31

## 2024-06-09 RX ADMIN — Medication 1 MILLIGRAM(S): at 12:03

## 2024-06-09 RX ADMIN — Medication 1 TABLET(S): at 12:03

## 2024-06-09 RX ADMIN — HEPARIN SODIUM 5000 UNIT(S): 5000 INJECTION INTRAVENOUS; SUBCUTANEOUS at 18:26

## 2024-06-09 RX ADMIN — Medication 2 MILLIGRAM(S): at 05:32

## 2024-06-09 RX ADMIN — LACTULOSE 20 GRAM(S): 10 SOLUTION ORAL at 05:32

## 2024-06-09 RX ADMIN — LACTULOSE 20 GRAM(S): 10 SOLUTION ORAL at 23:01

## 2024-06-09 RX ADMIN — Medication 2 MILLIGRAM(S): at 18:26

## 2024-06-09 NOTE — PROGRESS NOTE ADULT - ASSESSMENT
38yoM hx HTN, ETOH use (daily drinker), PSH gastric sleeve, presenting retirement ETOH and Acetaminophen in acute alcohol withdrawal.

## 2024-06-09 NOTE — PROVIDER CONTACT NOTE (MEDICATION) - ASSESSMENT
pt a&ox3, forgetful at times, VSS, no acute distress noted. pt states he no longer wants IV medications.

## 2024-06-09 NOTE — PROVIDER CONTACT NOTE (MEDICATION) - SITUATION
pt states he no longer wants IV medications, pt is on an ativan taper and has scheduled doses for 2mg IV push ativan at 0000 and 0600.

## 2024-06-09 NOTE — PROGRESS NOTE ADULT - SUBJECTIVE AND OBJECTIVE BOX
Patient is a 38y old  Male who presents with a chief complaint of liver transplant workup (08 Jun 2024 14:33)      SUBJECTIVE / OVERNIGHT EVENTS:    feels angry bc no one brought him dinner. no BM. no fevers. chills, abdominal pain     ROS:  14 point ROS negative in detail except stated as above    MEDICATIONS  (STANDING):  amLODIPine   Tablet 5 milliGRAM(s) Oral daily  chlorhexidine 2% Cloths 1 Application(s) Topical <User Schedule>  folic acid 1 milliGRAM(s) Oral daily  haloperidol     Tablet 5 milliGRAM(s) Oral at bedtime  heparin   Injectable 5000 Unit(s) SubCutaneous every 12 hours  lactulose Syrup 20 Gram(s) Oral four times a day  LORazepam   Injectable 2 milliGRAM(s) IV Push every 6 hours  multivitamin 1 Tablet(s) Oral daily  nicotine - 21 mG/24Hr(s) Patch 1 Patch Transdermal daily  pantoprazole    Tablet 40 milliGRAM(s) Oral before breakfast  QUEtiapine 50 milliGRAM(s) Oral at bedtime  QUEtiapine 25 milliGRAM(s) Oral daily  rifAXIMin 550 milliGRAM(s) Oral two times a day  thiamine Injectable 250 milliGRAM(s) IV Push daily    MEDICATIONS  (PRN):  haloperidol    Injectable 5 milliGRAM(s) IV Push every 6 hours PRN Agitation      CAPILLARY BLOOD GLUCOSE        I&O's Summary      PHYSICAL EXAM:  Vital Signs Last 24 Hrs  T(C): 36.4 (09 Jun 2024 05:00), Max: 36.9 (08 Jun 2024 21:53)  T(F): 97.6 (09 Jun 2024 05:00), Max: 98.4 (08 Jun 2024 21:53)  HR: 101 (09 Jun 2024 05:00) (70 - 101)  BP: 121/72 (09 Jun 2024 05:00) (111/72 - 127/79)  BP(mean): --  RR: 18 (09 Jun 2024 05:00) (18 - 18)  SpO2: 98% (09 Jun 2024 05:00) (95% - 99%)    Parameters below as of 09 Jun 2024 05:00  Patient On (Oxygen Delivery Method): room air      GENERAL: NAD, well-developed  HEAD:  Atraumatic, Normocephalic  EYES: EOMI, PERRL, conjunctiva and sclera clear  NECK: Supple, No JVD  CHEST/LUNG: Clear to auscultation bilaterally; No wheeze  HEART: Regular rate and rhythm; No murmurs, rubs, or gallops  ABDOMEN: Soft, Nontender, Nondistended; Bowel sounds present  EXTREMITIES:  2+ Peripheral Pulses, No clubbing, cyanosis, or edema  NEUROLOGY: AAOx3; non-focal  SKIN: No rashes or lesions    LABS:                        11.1   10.71 )-----------( 230      ( 09 Jun 2024 08:12 )             33.5     06-09    140  |  107  |  15  ----------------------------<  74  3.9   |  19<L>  |  0.64    Ca    9.0      09 Jun 2024 08:12    TPro  7.9  /  Alb  3.1<L>  /  TBili  9.0<H>  /  DBili  x   /  AST  98<H>  /  ALT  139<H>  /  AlkPhos  259<H>  06-09    PT/INR - ( 09 Jun 2024 08:12 )   PT: 12.2 sec;   INR: 1.11 ratio         PTT - ( 09 Jun 2024 08:12 )  PTT:34.7 sec      Urinalysis Basic - ( 09 Jun 2024 08:12 )    Color: x / Appearance: x / SG: x / pH: x  Gluc: 74 mg/dL / Ketone: x  / Bili: x / Urobili: x   Blood: x / Protein: x / Nitrite: x   Leuk Esterase: x / RBC: x / WBC x   Sq Epi: x / Non Sq Epi: x / Bacteria: x        RADIOLOGY & ADDITIONAL TESTS:    Imaging Personally Reviewed:    Consultant(s) Notes Reviewed:      Care Discussed with Consultants/Other Providers:

## 2024-06-09 NOTE — PROGRESS NOTE ADULT - PROBLEM SELECTOR PLAN 1
- Continue standing PO ativan for now; can switch to IV/IM if not tolerating PO TAPER NEEDS TO BE MANUALLY ADJUSTED AS CIWAS REMAINED HIGH ON STANDARD TAPER  6/7- Ativan 4mg IV Q6hrs (not reliably taking PO, sometimes hides it)  - PRN IV ativan also ordered for acute agitation/increase in CIWA  - per psych--> Will wean off Seroquel (if stopped suddenly concern for hypotension)    -Standing and Symptom-triggered CIWA as per primary team (Change route to IM/IV)  -Can consider using Haldol 5mg PO/IV/IM Q 6 HRs for acute agitation, monitor if QTC<500   -Consider Haldol 5 mg BID PO HS to aid with mood stabilization in lieu of Seroquel use   - Seroquel  weaned to 25mg AM and 50mg PM, f/u psych tomoprrow for further weaning if we can DC on 6/10  Haldol 5mg PO qhs; haldol 5mg IV q6hr prn agitation; last Qtc 417, will order repeat for this morning Hold parameters for QTC > 500 in place     - high dose thiamine, folate, MV supplementation  - Continue constant observation for now-Psychiatry not requiring 1:1, it is for agitation nursing/patient safety.

## 2024-06-10 LAB
ALBUMIN SERPL ELPH-MCNC: 3 G/DL — LOW (ref 3.3–5)
ALP SERPL-CCNC: 269 U/L — HIGH (ref 40–120)
ALT FLD-CCNC: 120 U/L — HIGH (ref 10–45)
ANION GAP SERPL CALC-SCNC: 13 MMOL/L — SIGNIFICANT CHANGE UP (ref 5–17)
AST SERPL-CCNC: 93 U/L — HIGH (ref 10–40)
BILIRUB SERPL-MCNC: 6.8 MG/DL — HIGH (ref 0.2–1.2)
BUN SERPL-MCNC: 15 MG/DL — SIGNIFICANT CHANGE UP (ref 7–23)
CALCIUM SERPL-MCNC: 8.9 MG/DL — SIGNIFICANT CHANGE UP (ref 8.4–10.5)
CHLORIDE SERPL-SCNC: 105 MMOL/L — SIGNIFICANT CHANGE UP (ref 96–108)
CO2 SERPL-SCNC: 19 MMOL/L — LOW (ref 22–31)
CREAT SERPL-MCNC: 0.63 MG/DL — SIGNIFICANT CHANGE UP (ref 0.5–1.3)
EGFR: 126 ML/MIN/1.73M2 — SIGNIFICANT CHANGE UP
GLUCOSE SERPL-MCNC: 118 MG/DL — HIGH (ref 70–99)
HCT VFR BLD CALC: 34.1 % — LOW (ref 39–50)
HGB BLD-MCNC: 11.1 G/DL — LOW (ref 13–17)
MCHC RBC-ENTMCNC: 32 PG — SIGNIFICANT CHANGE UP (ref 27–34)
MCHC RBC-ENTMCNC: 32.6 GM/DL — SIGNIFICANT CHANGE UP (ref 32–36)
MCV RBC AUTO: 98.3 FL — SIGNIFICANT CHANGE UP (ref 80–100)
NRBC # BLD: 0 /100 WBCS — SIGNIFICANT CHANGE UP (ref 0–0)
PLATELET # BLD AUTO: 253 K/UL — SIGNIFICANT CHANGE UP (ref 150–400)
POTASSIUM SERPL-MCNC: 3.8 MMOL/L — SIGNIFICANT CHANGE UP (ref 3.5–5.3)
POTASSIUM SERPL-SCNC: 3.8 MMOL/L — SIGNIFICANT CHANGE UP (ref 3.5–5.3)
PROT SERPL-MCNC: 7.8 G/DL — SIGNIFICANT CHANGE UP (ref 6–8.3)
RBC # BLD: 3.47 M/UL — LOW (ref 4.2–5.8)
RBC # FLD: 17.8 % — HIGH (ref 10.3–14.5)
SODIUM SERPL-SCNC: 137 MMOL/L — SIGNIFICANT CHANGE UP (ref 135–145)
WBC # BLD: 10.29 K/UL — SIGNIFICANT CHANGE UP (ref 3.8–10.5)
WBC # FLD AUTO: 10.29 K/UL — SIGNIFICANT CHANGE UP (ref 3.8–10.5)

## 2024-06-10 PROCEDURE — 99232 SBSQ HOSP IP/OBS MODERATE 35: CPT | Mod: GC

## 2024-06-10 PROCEDURE — 99231 SBSQ HOSP IP/OBS SF/LOW 25: CPT

## 2024-06-10 PROCEDURE — 99233 SBSQ HOSP IP/OBS HIGH 50: CPT

## 2024-06-10 RX ORDER — QUETIAPINE FUMARATE 200 MG/1
25 TABLET, FILM COATED ORAL AT BEDTIME
Refills: 0 | Status: DISCONTINUED | OUTPATIENT
Start: 2024-06-10 | End: 2024-06-11

## 2024-06-10 RX ADMIN — PANTOPRAZOLE SODIUM 40 MILLIGRAM(S): 20 TABLET, DELAYED RELEASE ORAL at 06:18

## 2024-06-10 RX ADMIN — LACTULOSE 20 GRAM(S): 10 SOLUTION ORAL at 17:55

## 2024-06-10 RX ADMIN — Medication 1 TABLET(S): at 11:45

## 2024-06-10 RX ADMIN — Medication 2 MILLIGRAM(S): at 06:18

## 2024-06-10 RX ADMIN — Medication 2 MILLIGRAM(S): at 18:11

## 2024-06-10 RX ADMIN — QUETIAPINE FUMARATE 25 MILLIGRAM(S): 200 TABLET, FILM COATED ORAL at 11:44

## 2024-06-10 RX ADMIN — Medication 250 MILLIGRAM(S): at 11:46

## 2024-06-10 RX ADMIN — HEPARIN SODIUM 5000 UNIT(S): 5000 INJECTION INTRAVENOUS; SUBCUTANEOUS at 17:56

## 2024-06-10 RX ADMIN — Medication 1 PATCH: at 19:20

## 2024-06-10 RX ADMIN — QUETIAPINE FUMARATE 25 MILLIGRAM(S): 200 TABLET, FILM COATED ORAL at 22:08

## 2024-06-10 RX ADMIN — Medication 2 MILLIGRAM(S): at 00:54

## 2024-06-10 RX ADMIN — HALOPERIDOL DECANOATE 5 MILLIGRAM(S): 100 INJECTION INTRAMUSCULAR at 22:06

## 2024-06-10 RX ADMIN — CHLORHEXIDINE GLUCONATE 1 APPLICATION(S): 213 SOLUTION TOPICAL at 06:17

## 2024-06-10 RX ADMIN — LACTULOSE 20 GRAM(S): 10 SOLUTION ORAL at 11:45

## 2024-06-10 RX ADMIN — Medication 1 MILLIGRAM(S): at 11:45

## 2024-06-10 RX ADMIN — HEPARIN SODIUM 5000 UNIT(S): 5000 INJECTION INTRAVENOUS; SUBCUTANEOUS at 06:17

## 2024-06-10 RX ADMIN — Medication 1 PATCH: at 11:46

## 2024-06-10 RX ADMIN — AMLODIPINE BESYLATE 5 MILLIGRAM(S): 2.5 TABLET ORAL at 06:18

## 2024-06-10 RX ADMIN — Medication 2 MILLIGRAM(S): at 11:44

## 2024-06-10 RX ADMIN — LACTULOSE 20 GRAM(S): 10 SOLUTION ORAL at 06:18

## 2024-06-10 NOTE — BH CONSULTATION LIAISON PROGRESS NOTE - NSBHCONSULTPRIMARYDISCUSSYES_PSY_A_CORE FT
Discussed with primary team attending
details above 
Discussed with primary team attending
details above

## 2024-06-10 NOTE — BH CONSULTATION LIAISON PROGRESS NOTE - NSICDXBHPRIMARYDX_PSY_ALL_CORE
Severe alcohol use disorder   F10.20  

## 2024-06-10 NOTE — PROGRESS NOTE ADULT - SUBJECTIVE AND OBJECTIVE BOX
Patient is a 38y old  Male who presents with a chief complaint of liver transplant workup (10 Nirav 2024 09:48)      SUBJECTIVE / OVERNIGHT EVENTS: pt a bit frustrated, wants to go home, no cp, sob, chills     MEDICATIONS  (STANDING):  amLODIPine   Tablet 5 milliGRAM(s) Oral daily  chlorhexidine 2% Cloths 1 Application(s) Topical <User Schedule>  folic acid 1 milliGRAM(s) Oral daily  haloperidol     Tablet 5 milliGRAM(s) Oral at bedtime  heparin   Injectable 5000 Unit(s) SubCutaneous every 12 hours  lactulose Syrup 20 Gram(s) Oral four times a day  LORazepam     Tablet 2 milliGRAM(s) Oral every 6 hours  multivitamin 1 Tablet(s) Oral daily  nicotine - 21 mG/24Hr(s) Patch 1 Patch Transdermal daily  pantoprazole    Tablet 40 milliGRAM(s) Oral before breakfast  QUEtiapine 50 milliGRAM(s) Oral at bedtime  QUEtiapine 25 milliGRAM(s) Oral daily  rifAXIMin 550 milliGRAM(s) Oral two times a day  thiamine Injectable 250 milliGRAM(s) IV Push daily    MEDICATIONS  (PRN):  haloperidol    Injectable 5 milliGRAM(s) IV Push every 6 hours PRN Agitation        CAPILLARY BLOOD GLUCOSE        I&O's Summary    09 Jun 2024 07:01  -  10 Nirav 2024 07:00  --------------------------------------------------------  IN: 600 mL / OUT: 600 mL / NET: 0 mL    10 Nirav 2024 07:01  -  10 Nirav 2024 12:31  --------------------------------------------------------  IN: 360 mL / OUT: 1 mL / NET: 359 mL        PHYSICAL EXAM:  GENERAL: NAD, well-developed  HEAD:  Atraumatic, Normocephalic  EYES: conjunctiva and sclera clear  NECK: Supple, No JVD  CHEST/LUNG: Clear to auscultation bilaterally; No wheeze  HEART: Regular rate and rhythm; No murmurs, rubs, or gallops  ABDOMEN: Soft, Nontender, Nondistended; Bowel sounds present  EXTREMITIES:  2+ Peripheral Pulses, No clubbing, cyanosis, or edema  PSYCH: AAOx3      LABS:                        11.1   10.29 )-----------( 253      ( 10 Nirav 2024 10:02 )             34.1     06-10    137  |  105  |  15  ----------------------------<  118<H>  3.8   |  19<L>  |  0.63    Ca    8.9      10 Nirav 2024 10:02    TPro  7.8  /  Alb  3.0<L>  /  TBili  6.8<H>  /  DBili  x   /  AST  93<H>  /  ALT  120<H>  /  AlkPhos  269<H>  06-10    PT/INR - ( 09 Jun 2024 08:12 )   PT: 12.2 sec;   INR: 1.11 ratio         PTT - ( 09 Jun 2024 08:12 )  PTT:34.7 sec      Urinalysis Basic - ( 10 Nirav 2024 10:02 )    Color: x / Appearance: x / SG: x / pH: x  Gluc: 118 mg/dL / Ketone: x  / Bili: x / Urobili: x   Blood: x / Protein: x / Nitrite: x   Leuk Esterase: x / RBC: x / WBC x   Sq Epi: x / Non Sq Epi: x / Bacteria: x        RADIOLOGY & ADDITIONAL TESTS:    Imaging Personally Reviewed:    Consultant(s) Notes Reviewed:      Care Discussed with Consultants/Other Providers:

## 2024-06-10 NOTE — BH CONSULTATION LIAISON PROGRESS NOTE - NSICDXBHSECONDARYDX_PSY_ALL_CORE
Pre-transplant evaluation for liver transplant   Z01.818  

## 2024-06-10 NOTE — PROGRESS NOTE ADULT - SUBJECTIVE AND OBJECTIVE BOX
Chief Complaint:  Patient is a 38y old  Male who presents with a chief complaint of liver transplant workup (09 Jun 2024 09:36)    Interval Events:   vitals stable    Allergies:  No Known Allergies        Hospital Medications:  amLODIPine   Tablet 5 milliGRAM(s) Oral daily  chlorhexidine 2% Cloths 1 Application(s) Topical <User Schedule>  folic acid 1 milliGRAM(s) Oral daily  haloperidol     Tablet 5 milliGRAM(s) Oral at bedtime  haloperidol    Injectable 5 milliGRAM(s) IV Push every 6 hours PRN  heparin   Injectable 5000 Unit(s) SubCutaneous every 12 hours  lactulose Syrup 20 Gram(s) Oral four times a day  LORazepam   Injectable 2 milliGRAM(s) IV Push every 6 hours  multivitamin 1 Tablet(s) Oral daily  nicotine - 21 mG/24Hr(s) Patch 1 Patch Transdermal daily  pantoprazole    Tablet 40 milliGRAM(s) Oral before breakfast  QUEtiapine 50 milliGRAM(s) Oral at bedtime  QUEtiapine 25 milliGRAM(s) Oral daily  rifAXIMin 550 milliGRAM(s) Oral two times a day  thiamine Injectable 250 milliGRAM(s) IV Push daily      PMHX/PSHX:  No pertinent past medical history    Hypertension, unspecified type    No significant past surgical history    H/O gastric sleeve        Family history:      PHYSICAL EXAM:   Vital Signs:  Vital Signs Last 24 Hrs  T(C): 36.9 (10 Nirav 2024 06:00), Max: 36.9 (10 Nirav 2024 06:00)  T(F): 98.4 (10 Nirav 2024 06:00), Max: 98.4 (10 Nirav 2024 06:00)  HR: 74 (10 Nirav 2024 06:00) (74 - 86)  BP: 123/72 (10 Nirav 2024 06:00) (123/72 - 132/78)  BP(mean): --  RR: 17 (10 Nirav 2024 06:00) (17 - 18)  SpO2: 99% (10 Nirav 2024 06:00) (98% - 99%)    Parameters below as of 10 Nirav 2024 06:00  Patient On (Oxygen Delivery Method): room air      Daily     Daily     GENERAL:  No acute distress  HEENT:  no scleral icterus  CHEST:  no accessory muscle use  HEART:  Regular rate and rhythm  ABDOMEN:  Soft, non-tender, non-distended  EXTREMITIES:  No edema  SKIN:  No rash/ecchymoses  NEURO:  Alert and oriented x 3    LABS:                        11.1   10.71 )-----------( 230      ( 09 Jun 2024 08:12 )             33.5       06-09    140  |  107  |  15  ----------------------------<  74  3.9   |  19<L>  |  0.64    Ca    9.0      09 Jun 2024 08:12    TPro  7.9  /  Alb  3.1<L>  /  TBili  9.0<H>  /  DBili  x   /  AST  98<H>  /  ALT  139<H>  /  AlkPhos  259<H>  06-09    LIVER FUNCTIONS - ( 09 Jun 2024 08:12 )  Alb: 3.1 g/dL / Pro: 7.9 g/dL / ALK PHOS: 259 U/L / ALT: 139 U/L / AST: 98 U/L / GGT: x           PT/INR - ( 09 Jun 2024 08:12 )   PT: 12.2 sec;   INR: 1.11 ratio         PTT - ( 09 Jun 2024 08:12 )  PTT:34.7 sec  Urinalysis Basic - ( 09 Jun 2024 08:12 )    Color: x / Appearance: x / SG: x / pH: x  Gluc: 74 mg/dL / Ketone: x  / Bili: x / Urobili: x   Blood: x / Protein: x / Nitrite: x   Leuk Esterase: x / RBC: x / WBC x   Sq Epi: x / Non Sq Epi: x / Bacteria: x                              11.1   10.71 )-----------( 230      ( 09 Jun 2024 08:12 )             33.5

## 2024-06-10 NOTE — BH CONSULTATION LIAISON PROGRESS NOTE - NSBHCHARTREVIEWINVESTIGATE_PSY_A_CORE FT
[facility] 05/29       ACC: [id number] EXAM:  CT BRAIN   ORDERED BY: DANIEL HUERTAS     PROCEDURE DATE:  05/29/2024          INTERPRETATION:  History: ams.    CT head 5/29/2024    Thin axial sections through the head obtained from skull base to vertex.   Coronal and sagittal reformatted images provided.    Comparison made to previous CT head 10/20/2021    The ventricles are normal in size and configuration. The gray-white   matter differentiation is preserved. There is no midline shift or mass   effect. There is no acute intracranial hemorrhage. There are no abnormal   extra-axial collections.    The calvarium is intact. The visualized orbits are unremarkable. The   visualized paranasal sinuses and tympanomastoid cavities are   well-aerated. There is a left nostril ring partially visualized.    IMPRESSION:    No acute intracranial hemorrhage, mass effect or midline shift.    --- End of Report ---        
[facility] 05/29       ACC: [id number] EXAM:  CT BRAIN   ORDERED BY: DANIEL HUERTAS     PROCEDURE DATE:  05/29/2024          INTERPRETATION:  History: ams.    CT head 5/29/2024    Thin axial sections through the head obtained from skull base to vertex.   Coronal and sagittal reformatted images provided.    Comparison made to previous CT head 10/20/2021    The ventricles are normal in size and configuration. The gray-white   matter differentiation is preserved. There is no midline shift or mass   effect. There is no acute intracranial hemorrhage. There are no abnormal   extra-axial collections.    The calvarium is intact. The visualized orbits are unremarkable. The   visualized paranasal sinuses and tympanomastoid cavities are   well-aerated. There is a left nostril ring partially visualized.    IMPRESSION:    No acute intracranial hemorrhage, mass effect or midline shift.    --- End of Report ---        
[facility] 05/29       ACC: [id number] EXAM:  CT BRAIN   ORDERED BY: DANIEL HUERTAS     PROCEDURE DATE:  05/29/2024          INTERPRETATION:  History: ams.    CT head 5/29/2024    Thin axial sections through the head obtained from skull base to vertex.   Coronal and sagittal reformatted images provided.    Comparison made to previous CT head 10/20/2021    The ventricles are normal in size and configuration. The gray-white   matter differentiation is preserved. There is no midline shift or mass   effect. There is no acute intracranial hemorrhage. There are no abnormal   extra-axial collections.    The calvarium is intact. The visualized orbits are unremarkable. The   visualized paranasal sinuses and tympanomastoid cavities are   well-aerated. There is a left nostril ring partially visualized.    IMPRESSION:    No acute intracranial hemorrhage, mass effect or midline shift.    --- End of Report ---        
#EKG from 6/4 reviewed

## 2024-06-10 NOTE — PROGRESS NOTE ADULT - ASSESSMENT
38M PMH HTN, ETOH use (daily drinker) PSH gastric sleeve, initially admitted to [facility] for jaundice and confusion, found to have elevated transaminases to 4000s with high MELD and withdrawal symptoms, transferred to [facility] for liver transplant workup now with improving liver function.     #Acute liver failure in the setting of decompensated alcoholic cirrhosis  -MELD 23 -> 19. Presented w/ elevated liver enzymes, predominantly hepatocellular pattern, now AST/ALT trending down, w/ bili rising. INR now WNL  - hepatitis B surface antibody, hepatitis B surface antigen, hepatitis core antibody, hepatitis C NR   - EV: No prior EGD.   - CT chest/ A/P with diffuse, marked hepatic steatosis   - CT head with no acute intracranial hemorrhage, mass effect or midline shift.  - MERLY negative, anti-mitochondrial antibody <1:20, anti-smooth muscle antibody <1:20, immunoglobulins (IgG 1022, IgM 122, IgA quantitative 502) for autoimmune etiologies. Utox neg.     #leukocytosis  - infectious workup negative to date; off antibiotics; afebrile    Recommendations:  -trend clinical symptoms, exam findings, vital signs, CBC, CMP, INR  - recs appreciated   -rifaximin 550mg BID and lactulose which should be uptitrated to target 3-5 daily BMs  -will need outpatient follow up with hepatologist Dr. King    Note and recommendations are incomplete until reviewed and attested by attending.    Tamie Delatorre MD  GI/Hepatology Fellow, PGY4  Long Range Pager [phone] or [facility] Pager [phone]  Teams preferred (7AM to 5PM); after 5PM, call GI fellow on call    On Weekends/Holidays (All Day) and Weekdays after 5PM to 8AM  For non-urgent consults, please email [email or web address] and [email or web address]  For urgent consults, please contact on call GI team. See Amion schedule ([facility]), [employer] paging system ([facility]), or call hospital  ([facility]) 38M PMH HTN, ETOH use (daily drinker) PSH gastric sleeve, initially admitted to [facility] for jaundice and confusion, found to have elevated transaminases to 4000s with high MELD and withdrawal symptoms, transferred to [facility] for liver transplant workup now with improving liver function.     #Acute liver failure in the setting of decompensated alcoholic cirrhosis  -MELD 23 -> 19. Presented w/ elevated liver enzymes, predominantly hepatocellular pattern, now AST/ALT trending down, w/ bili rising. INR now WNL  - hepatitis B surface antibody, hepatitis B surface antigen, hepatitis core antibody, hepatitis C NR   - EV: No prior EGD.   - CT chest/ A/P with diffuse, marked hepatic steatosis   - CT head with no acute intracranial hemorrhage, mass effect or midline shift.  - MERLY negative, anti-mitochondrial antibody <1:20, anti-smooth muscle antibody <1:20, immunoglobulins (IgG 1022, IgM 122, IgA quantitative 502) for autoimmune etiologies. Utox neg.     #leukocytosis  - infectious workup negative to date; off antibiotics; afebrile    Recommendations:  -trend clinical symptoms, exam findings, vital signs, CBC, CMP, INR  - recs appreciated   -rifaximin 550mg BID and lactulose which should be uptitrated to target 3-5 daily BMs  -no further inpatient hepatology workup at this time. Hepatology will sign off. Pt will need outpatient follow up with hepatologist Dr. King. Please reach out to hepatology team when pt is nearing discharge to help set up follow up appointment with Hepatology.    Note and recommendations are incomplete until reviewed and attested by attending.    Tamie Delatorre MD  GI/Hepatology Fellow, PGY4  Long Range Pager [phone] or [facility] Pager [phone]  Teams preferred (7AM to 5PM); after 5PM, call GI fellow on call    On Weekends/Holidays (All Day) and Weekdays after 5PM to 8AM  For non-urgent consults, please email [email or web address] and [email or web address]  For urgent consults, please contact on call GI team. See Amion schedule ([facility]), [employer] paging system ([facility]), or call hospital  ([facility])

## 2024-06-10 NOTE — BH CONSULTATION LIAISON PROGRESS NOTE - NSBHCONSULTWORKUPLABSFT_PSY_ALL_CORE
PETH, Vit b12, folate, TSH w/ FT4

## 2024-06-10 NOTE — BH CONSULTATION LIAISON PROGRESS NOTE - CURRENT MEDICATION
MEDICATIONS  (STANDING):  amLODIPine   Tablet 5 milliGRAM(s) Oral daily  chlorhexidine 2% Cloths 1 Application(s) Topical <User Schedule>  fluconAZOLE IVPB      fluconAZOLE IVPB 400 milliGRAM(s) IV Intermittent every 24 hours  folic acid 1 milliGRAM(s) Oral daily  heparin   Injectable 5000 Unit(s) SubCutaneous every 12 hours  LORazepam     Tablet 4 milliGRAM(s) Oral every 4 hours  meropenem  IVPB      meropenem  IVPB 1000 milliGRAM(s) IV Intermittent every 8 hours  multivitamin 1 Tablet(s) Oral daily  nicotine - 21 mG/24Hr(s) Patch 1 Patch Transdermal daily  pantoprazole    Tablet 40 milliGRAM(s) Oral before breakfast  QUEtiapine 200 milliGRAM(s) Oral <User Schedule>  QUEtiapine 400 milliGRAM(s) Oral <User Schedule>  rifAXIMin 550 milliGRAM(s) Oral two times a day  thiamine 100 milliGRAM(s) Oral daily    MEDICATIONS  (PRN):  LORazepam   Injectable 2 milliGRAM(s) IV Push every 2 hours PRN Agitation  
MEDICATIONS  (STANDING):  amLODIPine   Tablet 5 milliGRAM(s) Oral daily  chlorhexidine 2% Cloths 1 Application(s) Topical <User Schedule>  fluconAZOLE IVPB 400 milliGRAM(s) IV Intermittent every 24 hours  fluconAZOLE IVPB      folic acid 1 milliGRAM(s) Oral daily  haloperidol    Injectable 2.5 milliGRAM(s) IV Push every 12 hours  heparin   Injectable 5000 Unit(s) SubCutaneous every 12 hours  lactulose Syrup 20 Gram(s) Oral daily  LORazepam   Injectable 4 milliGRAM(s) IV Push every 6 hours  meropenem  IVPB      meropenem  IVPB 1000 milliGRAM(s) IV Intermittent every 8 hours  multivitamin 1 Tablet(s) Oral daily  nicotine - 21 mG/24Hr(s) Patch 1 Patch Transdermal daily  pantoprazole    Tablet 40 milliGRAM(s) Oral before breakfast  QUEtiapine 200 milliGRAM(s) Oral at bedtime  rifAXIMin 550 milliGRAM(s) Oral two times a day  thiamine 100 milliGRAM(s) Oral daily    MEDICATIONS  (PRN):  haloperidol    Injectable 5 milliGRAM(s) IV Push every 6 hours PRN Agitation  LORazepam   Injectable 2 milliGRAM(s) IV Push every 2 hours PRN Agitation  
MEDICATIONS  (STANDING):  acetylcysteine IVPB 12 Gram(s) IV Intermittent every 24 hours  amLODIPine   Tablet 5 milliGRAM(s) Oral daily  chlorhexidine 2% Cloths 1 Application(s) Topical <User Schedule>  dexMEDEtomidine Infusion 0.2 MICROgram(s)/kG/Hr (3.98 mL/Hr) IV Continuous <Continuous>  folic acid 1 milliGRAM(s) Oral daily  heparin   Injectable 5000 Unit(s) SubCutaneous every 12 hours  insulin lispro (ADMELOG) corrective regimen sliding scale   SubCutaneous three times a day before meals  insulin lispro (ADMELOG) corrective regimen sliding scale   SubCutaneous at bedtime  lactulose Syrup 20 Gram(s) Oral every 4 hours  LORazepam     Tablet 2 milliGRAM(s) Oral every 4 hours  multivitamin 1 Tablet(s) Oral daily  nicotine - 21 mG/24Hr(s) Patch 1 Patch Transdermal daily  pantoprazole    Tablet 40 milliGRAM(s) Oral before breakfast  piperacillin/tazobactam IVPB.. 3.375 Gram(s) IV Intermittent every 8 hours  polyethylene glycol 3350 17 Gram(s) Oral every 12 hours  potassium phosphate IVPB 30 milliMole(s) IV Intermittent once  potassium phosphate IVPB 15 milliMole(s) IV Intermittent once  QUEtiapine 100 milliGRAM(s) Oral every 12 hours  rifAXIMin 550 milliGRAM(s) Oral two times a day  thiamine Injectable 100 milliGRAM(s) IV Push daily    MEDICATIONS  (PRN):  LORazepam   Injectable 2 milliGRAM(s) IV Push every 2 hours PRN Agitation  LORazepam   Injectable 2 milliGRAM(s) IV Push every 1 hour PRN CIWA-Ar score 8 or greater  
MEDICATIONS  (STANDING):  amLODIPine   Tablet 5 milliGRAM(s) Oral daily  chlorhexidine 2% Cloths 1 Application(s) Topical <User Schedule>  folic acid 1 milliGRAM(s) Oral daily  haloperidol     Tablet 5 milliGRAM(s) Oral at bedtime  heparin   Injectable 5000 Unit(s) SubCutaneous every 12 hours  lactulose Syrup 20 Gram(s) Oral four times a day  LORazepam     Tablet 2 milliGRAM(s) Oral every 6 hours  multivitamin 1 Tablet(s) Oral daily  nicotine - 21 mG/24Hr(s) Patch 1 Patch Transdermal daily  pantoprazole    Tablet 40 milliGRAM(s) Oral before breakfast  QUEtiapine 50 milliGRAM(s) Oral at bedtime  QUEtiapine 25 milliGRAM(s) Oral daily  rifAXIMin 550 milliGRAM(s) Oral two times a day  thiamine Injectable 250 milliGRAM(s) IV Push daily    MEDICATIONS  (PRN):  haloperidol    Injectable 5 milliGRAM(s) IV Push every 6 hours PRN Agitation

## 2024-06-10 NOTE — BH CONSULTATION LIAISON PROGRESS NOTE - NSBHCHARTREVIEWVS_PSY_A_CORE FT
Vital Signs Last 24 Hrs  T(C): 37.1 (10 Nirav 2024 10:00), Max: 37.1 (10 Nirav 2024 10:00)  T(F): 98.7 (10 Nirav 2024 10:00), Max: 98.7 (10 Nirav 2024 10:00)  HR: 91 (10 Nirav 2024 10:00) (74 - 91)  BP: 123/75 (10 Nirav 2024 10:00) (123/72 - 132/78)  BP(mean): --  RR: 18 (10 Nirav 2024 10:00) (17 - 18)  SpO2: 99% (10 Nirav 2024 10:00) (98% - 99%)    Parameters below as of 10 Nirav 2024 10:00  Patient On (Oxygen Delivery Method): room air    
Vital Signs Last 24 Hrs  T(C): 36.7 (03 Jun 2024 11:00), Max: 36.9 (03 Jun 2024 03:00)  T(F): 98 (03 Jun 2024 11:00), Max: 98.4 (03 Jun 2024 03:00)  HR: 72 (03 Jun 2024 12:00) (71 - 80)  BP: 110/63 (03 Jun 2024 12:00) (108/60 - 155/100)  BP(mean): 80 (03 Jun 2024 12:00) (77 - 122)  RR: 16 (03 Jun 2024 12:00) (15 - 28)  SpO2: 100% (03 Jun 2024 12:00) (93% - 100%)    Parameters below as of 03 Jun 2024 07:00  Patient On (Oxygen Delivery Method): room air    
Vital Signs Last 24 Hrs  T(C): 37.4 (06 Jun 2024 07:00), Max: 38.3 (05 Jun 2024 23:00)  T(F): 99.4 (06 Jun 2024 07:00), Max: 101 (05 Jun 2024 23:00)  HR: 102 (06 Jun 2024 10:00) (87 - 109)  BP: 109/61 (06 Jun 2024 10:00) (81/46 - 144/73)  BP(mean): 80 (06 Jun 2024 10:00) (60 - 100)  RR: 21 (06 Jun 2024 10:00) (17 - 29)  SpO2: 92% (06 Jun 2024 10:00) (92% - 97%)    Parameters below as of 06 Jun 2024 07:00  Patient On (Oxygen Delivery Method): room air    
Vital Signs Last 24 Hrs  T(C): 36.2 (07 Jun 2024 12:07), Max: 36.8 (07 Jun 2024 02:16)  T(F): 97.2 (07 Jun 2024 12:07), Max: 98.2 (07 Jun 2024 02:16)  HR: 89 (07 Jun 2024 12:07) (89 - 107)  BP: 110/70 (07 Jun 2024 12:07) (110/70 - 145/83)  BP(mean): 86 (06 Jun 2024 18:00) (82 - 86)  RR: 18 (07 Jun 2024 12:07) (18 - 31)  SpO2: 97% (07 Jun 2024 12:07) (92% - 97%)    Parameters below as of 07 Jun 2024 06:23  Patient On (Oxygen Delivery Method): room air

## 2024-06-10 NOTE — BH CONSULTATION LIAISON PROGRESS NOTE - NSBHFUPINTERVALHXFT_PSY_A_CORE
Patient with nursing staff at bedside for 1:1, started by primary team. Patient states he is "ok" and is just feeling restless being in the hospital, is AAOx4 on exam. Extensive discussion on EtOH use disorder and need for RPP enrollment to aid with this. Patient states he does not wish to enroll in a program and that he does not find ETOH use is problematic and will do it on his own. Patient in no acute distress, on CIWA taper as per primary team. Does not wish to take IV medications but will take PO. Denies any SI/HI/AVH and has no plan or intent of self harm. 
Patient with nursing staff at bedside for 1:1, started by primary team. On exam patient was hypersomnolent unable to participate in exam. As per nursing staff at bedtime, patient continues to have episodes of agitation, restless, with periods of behavioral disturbance, with hallucinations, seeing things in the room, and pulling at lines. As per chart review patient received 30mg of Ativan over a 24 hour period, and continues to be in active withdrawal from alcohol.   
Patient with nursing staff at bedside for 1:1, started by primary team. On exam patient was A/O x 1, agitated, attempting to get out of bed, as well as attempting to pull at lines and restraints. Per nursing staff at bedside, patient continues to have episodes of behavioral disturbance, with hallucinations, with periods of agitation and restless when awoke. 
Patient with nursing staff at bedside for 1:1, started by primary team. Patient states he was unaware about the events leading to admission states "can see the wall behind bathroom" when asked about why he is upset with staff. Mildy disorganized and AAOx 2-3 on exam but more alert than previous encounters. Denies SI/HI/AVH and has no plan or intent of self-harm.

## 2024-06-10 NOTE — PROGRESS NOTE ADULT - ASSESSMENT
38yoM hx HTN, ETOH use (daily drinker), PSH gastric sleeve, presenting [facility] ETOH and Acetaminophen in acute alcohol withdrawal.

## 2024-06-10 NOTE — BH CONSULTATION LIAISON PROGRESS NOTE - NSBHASSESSMENTFT_PSY_ALL_CORE
38-year-old, , , male, with PPHx of AUD, with no past psychiatric admissions, with PMH HTN, PSH gastric sleeve, initially admitted to [facility] for jaundice and confusion, found to have elevated transaminases to 4000s with high MELD and withdrawal symptoms, transferred to [facility] for liver transplant workup. Patient seen by transplant psychiatry as part of liver transplant evaluation.    5/31:Patient seen at bedside with nursing staff in the room, on exam patient was A/Ox1/2, hypersomnolent, with slurred speech, arousable only to physical stimuli. Patient noted that he still responding to internal stimuli on exam, noting that he experiencing AH and tactile while on Precedex at this time, and is still actively withdrawing from alcohol. Nursing staff at bedside noted that patient has been agitated, requiring increase in Precedex and multiple doses of PRN Ativan, and has received 10mg of Ativan in a 24 hour period at this time. Patient may benefit from phenobarbital use for acute withdrawal.   6/3: Patient on exam, continues to be agitated, responding to internal stimuli, A/Ox1, while receiving 12mg of Ativan as part of CIWA taper.    6/6:Patent on exam hypersomnolent, patient continues to experience withdrawal from alcohol at this time.    6/10: psychoeducation provided on RPP and MAT initiation; patient deferred. AAOx4     Plan  -Recommend SBIRT consult  -Standing and Symptom-triggered CIWA as per primary team  -Can consider using Haldol 5mg PO/IV/IM Q 6 HRs for acute agitation, monitor if QTC<500   -Please d/c Haldol 5 mg PO HS if continues to be on Seroquel 50 mg PO HS with 25 mg PO AM   - Seroquel to be tapered to discontinuation by primary team, if behavioral disturbance persists consider switch to Haldol 5 mg PO BID in lieu of Seroquel  -behavioral disturbance likely in setting of withdrawal, please use Ativan prior to use of antipsychotics for further deterioration  -SIPAT pending further education about transplant process - Transplant evaluation has been held at this time  -High dose IV Thiamine supplementation  -B12, folate, TSH w/ FT4, PETH >400 (5/29)  -Transplant psychiatry will follow peripherally, call with questions

## 2024-06-10 NOTE — PROGRESS NOTE ADULT - PROBLEM SELECTOR PLAN 1
- Pt refusing IV ativan   - On ativan 2mg po q6  - CIWA 1  - Taper down seroquel to 25mg qhs   - high dose thiamine, folate, MV supplementation  - Continue constant observation for now-Psychiatry not requiring 1:1, it is for agitation nursing/patient safety.  - SW follow up

## 2024-06-10 NOTE — PHARMACOTHERAPY INTERVENTION NOTE - COMMENTS
Performed medication reconciliation and home medication list updated in prescription writer/ outpatient medication review. Medications verified with patient at bedside and pharmacy.     Home medications:  Amlodipine 10mg QD    Miguelina ValenzuelaD Candidate Class of 2025  [facility]

## 2024-06-10 NOTE — BH CONSULTATION LIAISON PROGRESS NOTE - NSBHFUPINTERVALCCFT_PSY_A_CORE
"I don't want to go to a program"
"Let me out" 
"People are mean to me"
Patient on exam hypersomnolent

## 2024-06-10 NOTE — BH CONSULTATION LIAISON PROGRESS NOTE - NSBHPSYCHOLCOGABN_PSY_A_CORE
disoriented to time/disoriented to place
disoriented to time/disoriented to place
disoriented to time/disoriented to place/disoriented to situation

## 2024-06-10 NOTE — BH CONSULTATION LIAISON PROGRESS NOTE - NSBHCHARTREVIEWLAB_PSY_A_CORE FT
13.6   17.93 )-----------( 177      ( 02 Jun 2024 22:25 )             39.4     06-03    141  |  111<H>  |  9   ----------------------------<  100<H>  3.4<L>   |  17<L>  |  0.57    Ca    8.3<L>      03 Jun 2024 11:12  Phos  2.2     06-03  Mg     2.0     06-03    TPro  5.9<L>  /  Alb  2.9<L>  /  TBili  16.7<H>  /  DBili  x   /  AST  77<H>  /  ALT  438<H>  /  AlkPhos  238<H>  06-03  
                                           12.4   13.30 )-----------( 137      ( 06 Jun 2024 01:08 )             34.3     06-06    135  |  105  |  13  ----------------------------<  87  4.2   |  18<L>  |  0.67    Ca    8.6      06 Jun 2024 01:07  Phos  2.2     06-06  Mg     2.2     06-06    TPro  7.0  /  Alb  3.0<L>  /  TBili  18.3<H>  /  DBili  x   /  AST  93<H>  /  ALT  251<H>  /  AlkPhos  291<H>  06-06  
                      11.1   10.29 )-----------( 253      ( 10 Nirav 2024 10:02 )             34.1     06-10    137  |  105  |  15  ----------------------------<  118<H>  3.8   |  19<L>  |  0.63    Ca    8.9      10 Nirav 2024 10:02    TPro  7.8  /  Alb  3.0<L>  /  TBili  6.8<H>  /  DBili  x   /  AST  93<H>  /  ALT  120<H>  /  AlkPhos  269<H>  06-10  
                      11.5   10.28 )-----------( 159      ( 07 Jun 2024 07:15 )             32.8     06-07    135  |  101  |  16  ----------------------------<  69<L>  4.0   |  21<L>  |  0.69    Ca    9.1      07 Jun 2024 07:00  Phos  2.0     06-07  Mg     2.3     06-07    TPro  7.5  /  Alb  3.3  /  TBili  16.7<H>  /  DBili  x   /  AST  92<H>  /  ALT  198<H>  /  AlkPhos  268<H>  06-07

## 2024-06-10 NOTE — BH CONSULTATION LIAISON PROGRESS NOTE - NSBHATTESTTYPEVISIT_PSY_A_CORE
Attending Only
Attending Only
Attending evaluating patient with JEAN ([id number] code)
Attending evaluating patient with JEAN ([id number] code)

## 2024-06-11 ENCOUNTER — TRANSCRIPTION ENCOUNTER (OUTPATIENT)
Age: 39
End: 2024-06-11

## 2024-06-11 VITALS
SYSTOLIC BLOOD PRESSURE: 133 MMHG | OXYGEN SATURATION: 98 % | DIASTOLIC BLOOD PRESSURE: 79 MMHG | RESPIRATION RATE: 18 BRPM | HEART RATE: 92 BPM | TEMPERATURE: 98 F

## 2024-06-11 LAB
ALBUMIN SERPL ELPH-MCNC: 2.8 G/DL — LOW (ref 3.3–5)
ALP SERPL-CCNC: 289 U/L — HIGH (ref 40–120)
ALT FLD-CCNC: 101 U/L — HIGH (ref 10–45)
ANION GAP SERPL CALC-SCNC: 13 MMOL/L — SIGNIFICANT CHANGE UP (ref 5–17)
AST SERPL-CCNC: 79 U/L — HIGH (ref 10–40)
BILIRUB SERPL-MCNC: 5.2 MG/DL — HIGH (ref 0.2–1.2)
BUN SERPL-MCNC: 15 MG/DL — SIGNIFICANT CHANGE UP (ref 7–23)
CALCIUM SERPL-MCNC: 8.8 MG/DL — SIGNIFICANT CHANGE UP (ref 8.4–10.5)
CHLORIDE SERPL-SCNC: 106 MMOL/L — SIGNIFICANT CHANGE UP (ref 96–108)
CO2 SERPL-SCNC: 20 MMOL/L — LOW (ref 22–31)
CREAT SERPL-MCNC: 0.65 MG/DL — SIGNIFICANT CHANGE UP (ref 0.5–1.3)
CULTURE RESULTS: SIGNIFICANT CHANGE UP
CULTURE RESULTS: SIGNIFICANT CHANGE UP
EGFR: 124 ML/MIN/1.73M2 — SIGNIFICANT CHANGE UP
GLUCOSE SERPL-MCNC: 93 MG/DL — SIGNIFICANT CHANGE UP (ref 70–99)
HCT VFR BLD CALC: 32.4 % — LOW (ref 39–50)
HGB BLD-MCNC: 10.8 G/DL — LOW (ref 13–17)
MAGNESIUM SERPL-MCNC: 1.8 MG/DL — SIGNIFICANT CHANGE UP (ref 1.6–2.6)
MCHC RBC-ENTMCNC: 32.2 PG — SIGNIFICANT CHANGE UP (ref 27–34)
MCHC RBC-ENTMCNC: 33.3 GM/DL — SIGNIFICANT CHANGE UP (ref 32–36)
MCV RBC AUTO: 96.7 FL — SIGNIFICANT CHANGE UP (ref 80–100)
NRBC # BLD: 0 /100 WBCS — SIGNIFICANT CHANGE UP (ref 0–0)
PHOSPHATE SERPL-MCNC: 2 MG/DL — LOW (ref 2.5–4.5)
PLATELET # BLD AUTO: 233 K/UL — SIGNIFICANT CHANGE UP (ref 150–400)
POTASSIUM SERPL-MCNC: 3.7 MMOL/L — SIGNIFICANT CHANGE UP (ref 3.5–5.3)
POTASSIUM SERPL-SCNC: 3.7 MMOL/L — SIGNIFICANT CHANGE UP (ref 3.5–5.3)
PROT SERPL-MCNC: 7.4 G/DL — SIGNIFICANT CHANGE UP (ref 6–8.3)
RBC # BLD: 3.35 M/UL — LOW (ref 4.2–5.8)
RBC # FLD: 17.8 % — HIGH (ref 10.3–14.5)
SODIUM SERPL-SCNC: 139 MMOL/L — SIGNIFICANT CHANGE UP (ref 135–145)
SPECIMEN SOURCE: SIGNIFICANT CHANGE UP
SPECIMEN SOURCE: SIGNIFICANT CHANGE UP
WBC # BLD: 11.04 K/UL — HIGH (ref 3.8–10.5)
WBC # FLD AUTO: 11.04 K/UL — HIGH (ref 3.8–10.5)

## 2024-06-11 PROCEDURE — 85730 THROMBOPLASTIN TIME PARTIAL: CPT

## 2024-06-11 PROCEDURE — 84132 ASSAY OF SERUM POTASSIUM: CPT

## 2024-06-11 PROCEDURE — 86900 BLOOD TYPING SEROLOGIC ABO: CPT

## 2024-06-11 PROCEDURE — 84145 PROCALCITONIN (PCT): CPT

## 2024-06-11 PROCEDURE — 97116 GAIT TRAINING THERAPY: CPT

## 2024-06-11 PROCEDURE — 82390 ASSAY OF CERULOPLASMIN: CPT

## 2024-06-11 PROCEDURE — 85014 HEMATOCRIT: CPT

## 2024-06-11 PROCEDURE — 90739 HEPB VACC 2/4 DOSE ADULT IM: CPT

## 2024-06-11 PROCEDURE — 81003 URINALYSIS AUTO W/O SCOPE: CPT

## 2024-06-11 PROCEDURE — 82330 ASSAY OF CALCIUM: CPT

## 2024-06-11 PROCEDURE — 80053 COMPREHEN METABOLIC PANEL: CPT

## 2024-06-11 PROCEDURE — 84439 ASSAY OF FREE THYROXINE: CPT

## 2024-06-11 PROCEDURE — 36415 COLL VENOUS BLD VENIPUNCTURE: CPT

## 2024-06-11 PROCEDURE — 81001 URINALYSIS AUTO W/SCOPE: CPT

## 2024-06-11 PROCEDURE — 85027 COMPLETE CBC AUTOMATED: CPT

## 2024-06-11 PROCEDURE — 86735 MUMPS ANTIBODY: CPT

## 2024-06-11 PROCEDURE — 87389 HIV-1 AG W/HIV-1&-2 AB AG IA: CPT

## 2024-06-11 PROCEDURE — 97535 SELF CARE MNGMENT TRAINING: CPT

## 2024-06-11 PROCEDURE — 93306 TTE W/DOPPLER COMPLETE: CPT

## 2024-06-11 PROCEDURE — 87641 MR-STAPH DNA AMP PROBE: CPT

## 2024-06-11 PROCEDURE — 86644 CMV ANTIBODY: CPT

## 2024-06-11 PROCEDURE — 84295 ASSAY OF SERUM SODIUM: CPT

## 2024-06-11 PROCEDURE — 84100 ASSAY OF PHOSPHORUS: CPT

## 2024-06-11 PROCEDURE — 86695 HERPES SIMPLEX TYPE 1 TEST: CPT

## 2024-06-11 PROCEDURE — 86664 EPSTEIN-BARR NUCLEAR ANTIGEN: CPT

## 2024-06-11 PROCEDURE — 84425 ASSAY OF VITAMIN B-1: CPT

## 2024-06-11 PROCEDURE — 86780 TREPONEMA PALLIDUM: CPT

## 2024-06-11 PROCEDURE — 96374 THER/PROPH/DIAG INJ IV PUSH: CPT

## 2024-06-11 PROCEDURE — 86762 RUBELLA ANTIBODY: CPT

## 2024-06-11 PROCEDURE — 82803 BLOOD GASES ANY COMBINATION: CPT

## 2024-06-11 PROCEDURE — 85018 HEMOGLOBIN: CPT

## 2024-06-11 PROCEDURE — 86765 RUBEOLA ANTIBODY: CPT

## 2024-06-11 PROCEDURE — 93005 ELECTROCARDIOGRAM TRACING: CPT

## 2024-06-11 PROCEDURE — 93970 EXTREMITY STUDY: CPT

## 2024-06-11 PROCEDURE — 83036 HEMOGLOBIN GLYCOSYLATED A1C: CPT

## 2024-06-11 PROCEDURE — 86706 HEP B SURFACE ANTIBODY: CPT

## 2024-06-11 PROCEDURE — 86038 ANTINUCLEAR ANTIBODIES: CPT

## 2024-06-11 PROCEDURE — 82105 ALPHA-FETOPROTEIN SERUM: CPT

## 2024-06-11 PROCEDURE — 80307 DRUG TEST PRSMV CHEM ANLYZR: CPT

## 2024-06-11 PROCEDURE — 82140 ASSAY OF AMMONIA: CPT

## 2024-06-11 PROCEDURE — 85025 COMPLETE CBC W/AUTO DIFF WBC: CPT

## 2024-06-11 PROCEDURE — 86708 HEPATITIS A ANTIBODY: CPT

## 2024-06-11 PROCEDURE — 87086 URINE CULTURE/COLONY COUNT: CPT

## 2024-06-11 PROCEDURE — 97530 THERAPEUTIC ACTIVITIES: CPT

## 2024-06-11 PROCEDURE — 83540 ASSAY OF IRON: CPT

## 2024-06-11 PROCEDURE — 87799 DETECT AGENT NOS DNA QUANT: CPT

## 2024-06-11 PROCEDURE — 85610 PROTHROMBIN TIME: CPT

## 2024-06-11 PROCEDURE — 76705 ECHO EXAM OF ABDOMEN: CPT

## 2024-06-11 PROCEDURE — 87521 HEPATITIS C PROBE&RVRS TRNSC: CPT

## 2024-06-11 PROCEDURE — 80061 LIPID PANEL: CPT

## 2024-06-11 PROCEDURE — 86696 HERPES SIMPLEX TYPE 2 TEST: CPT

## 2024-06-11 PROCEDURE — 87640 STAPH A DNA AMP PROBE: CPT

## 2024-06-11 PROCEDURE — 86481 TB AG RESPONSE T-CELL SUSP: CPT

## 2024-06-11 PROCEDURE — 97165 OT EVAL LOW COMPLEX 30 MIN: CPT

## 2024-06-11 PROCEDURE — 82947 ASSAY GLUCOSE BLOOD QUANT: CPT

## 2024-06-11 PROCEDURE — 86381 MITOCHONDRIAL ANTIBODY EACH: CPT

## 2024-06-11 PROCEDURE — 86663 EPSTEIN-BARR ANTIBODY: CPT

## 2024-06-11 PROCEDURE — 82435 ASSAY OF BLOOD CHLORIDE: CPT

## 2024-06-11 PROCEDURE — 86850 RBC ANTIBODY SCREEN: CPT

## 2024-06-11 PROCEDURE — 97162 PT EVAL MOD COMPLEX 30 MIN: CPT

## 2024-06-11 PROCEDURE — 84443 ASSAY THYROID STIM HORMONE: CPT

## 2024-06-11 PROCEDURE — 83735 ASSAY OF MAGNESIUM: CPT

## 2024-06-11 PROCEDURE — 86787 VARICELLA-ZOSTER ANTIBODY: CPT

## 2024-06-11 PROCEDURE — 87529 HSV DNA AMP PROBE: CPT

## 2024-06-11 PROCEDURE — 86480 TB TEST CELL IMMUN MEASURE: CPT

## 2024-06-11 PROCEDURE — 97110 THERAPEUTIC EXERCISES: CPT

## 2024-06-11 PROCEDURE — 83605 ASSAY OF LACTIC ACID: CPT

## 2024-06-11 PROCEDURE — 83550 IRON BINDING TEST: CPT

## 2024-06-11 PROCEDURE — 87536 HIV-1 QUANT&REVRSE TRNSCRPJ: CPT

## 2024-06-11 PROCEDURE — 86753 PROTOZOA ANTIBODY NOS: CPT

## 2024-06-11 PROCEDURE — 86803 HEPATITIS C AB TEST: CPT

## 2024-06-11 PROCEDURE — G0480: CPT

## 2024-06-11 PROCEDURE — 86255 FLUORESCENT ANTIBODY SCREEN: CPT

## 2024-06-11 PROCEDURE — 86769 SARS-COV-2 COVID-19 ANTIBODY: CPT

## 2024-06-11 PROCEDURE — 87340 HEPATITIS B SURFACE AG IA: CPT

## 2024-06-11 PROCEDURE — 82962 GLUCOSE BLOOD TEST: CPT

## 2024-06-11 PROCEDURE — 99285 EMERGENCY DEPT VISIT HI MDM: CPT | Mod: 25

## 2024-06-11 PROCEDURE — 86665 EPSTEIN-BARR CAPSID VCA: CPT

## 2024-06-11 PROCEDURE — 82784 ASSAY IGA/IGD/IGG/IGM EACH: CPT

## 2024-06-11 PROCEDURE — 86635 COCCIDIOIDES ANTIBODY: CPT

## 2024-06-11 PROCEDURE — 97129 THER IVNTJ 1ST 15 MIN: CPT

## 2024-06-11 PROCEDURE — 71045 X-RAY EXAM CHEST 1 VIEW: CPT

## 2024-06-11 PROCEDURE — 96375 TX/PRO/DX INJ NEW DRUG ADDON: CPT

## 2024-06-11 PROCEDURE — 87798 DETECT AGENT NOS DNA AMP: CPT

## 2024-06-11 PROCEDURE — 86709 HEPATITIS A IGM ANTIBODY: CPT

## 2024-06-11 PROCEDURE — 84484 ASSAY OF TROPONIN QUANT: CPT

## 2024-06-11 PROCEDURE — 86682 HELMINTH ANTIBODY: CPT

## 2024-06-11 PROCEDURE — 87040 BLOOD CULTURE FOR BACTERIA: CPT

## 2024-06-11 PROCEDURE — 99239 HOSP IP/OBS DSCHRG MGMT >30: CPT

## 2024-06-11 PROCEDURE — 86901 BLOOD TYPING SEROLOGIC RH(D): CPT

## 2024-06-11 PROCEDURE — P9047: CPT

## 2024-06-11 PROCEDURE — 86777 TOXOPLASMA ANTIBODY: CPT

## 2024-06-11 PROCEDURE — 84153 ASSAY OF PSA TOTAL: CPT

## 2024-06-11 PROCEDURE — 87517 HEPATITIS B DNA QUANT: CPT

## 2024-06-11 RX ORDER — FOLIC ACID 0.8 MG
1 TABLET ORAL
Qty: 30 | Refills: 0
Start: 2024-06-11 | End: 2024-07-10

## 2024-06-11 RX ORDER — AMLODIPINE BESYLATE 2.5 MG/1
1 TABLET ORAL
Qty: 30 | Refills: 0
Start: 2024-06-11 | End: 2024-07-10

## 2024-06-11 RX ORDER — AMLODIPINE BESYLATE 2.5 MG/1
1 TABLET ORAL
Refills: 0 | DISCHARGE

## 2024-06-11 RX ORDER — LACTULOSE 10 G/15ML
30 SOLUTION ORAL
Qty: 3600 | Refills: 0
Start: 2024-06-11 | End: 2024-07-10

## 2024-06-11 RX ORDER — THIAMINE MONONITRATE (VIT B1) 100 MG
1 TABLET ORAL
Qty: 30 | Refills: 0
Start: 2024-06-11 | End: 2024-07-10

## 2024-06-11 RX ORDER — SODIUM,POTASSIUM PHOSPHATES 278-250MG
1 POWDER IN PACKET (EA) ORAL ONCE
Refills: 0 | Status: COMPLETED | OUTPATIENT
Start: 2024-06-11 | End: 2024-06-11

## 2024-06-11 RX ORDER — NICOTINE POLACRILEX 2 MG
1 GUM BUCCAL
Qty: 2 | Refills: 0
Start: 2024-06-11 | End: 2024-07-10

## 2024-06-11 RX ORDER — RIFAXIMIN 200 MG/1
1 TABLET ORAL
Qty: 60 | Refills: 0
Start: 2024-06-11 | End: 2024-07-10

## 2024-06-11 RX ORDER — THIAMINE MONONITRATE (VIT B1) 100 MG
100 TABLET ORAL DAILY
Refills: 0 | Status: DISCONTINUED | OUTPATIENT
Start: 2024-06-11 | End: 2024-06-11

## 2024-06-11 RX ADMIN — Medication 2 MILLIGRAM(S): at 00:03

## 2024-06-11 RX ADMIN — AMLODIPINE BESYLATE 5 MILLIGRAM(S): 2.5 TABLET ORAL at 06:13

## 2024-06-11 RX ADMIN — Medication 1 TABLET(S): at 12:14

## 2024-06-11 RX ADMIN — Medication 100 MILLIGRAM(S): at 13:31

## 2024-06-11 RX ADMIN — Medication 1 PATCH: at 06:57

## 2024-06-11 RX ADMIN — Medication 1 PATCH: at 12:42

## 2024-06-11 RX ADMIN — LACTULOSE 20 GRAM(S): 10 SOLUTION ORAL at 12:14

## 2024-06-11 RX ADMIN — HEPARIN SODIUM 5000 UNIT(S): 5000 INJECTION INTRAVENOUS; SUBCUTANEOUS at 06:13

## 2024-06-11 RX ADMIN — CHLORHEXIDINE GLUCONATE 1 APPLICATION(S): 213 SOLUTION TOPICAL at 06:59

## 2024-06-11 RX ADMIN — Medication 1 PATCH: at 12:13

## 2024-06-11 RX ADMIN — LACTULOSE 20 GRAM(S): 10 SOLUTION ORAL at 00:03

## 2024-06-11 RX ADMIN — Medication 1 PACKET(S): at 12:19

## 2024-06-11 RX ADMIN — PANTOPRAZOLE SODIUM 40 MILLIGRAM(S): 20 TABLET, DELAYED RELEASE ORAL at 06:13

## 2024-06-11 RX ADMIN — Medication 2 MILLIGRAM(S): at 06:13

## 2024-06-11 RX ADMIN — LACTULOSE 20 GRAM(S): 10 SOLUTION ORAL at 06:12

## 2024-06-11 RX ADMIN — Medication 1 MILLIGRAM(S): at 12:14

## 2024-06-11 RX ADMIN — Medication 2 MILLIGRAM(S): at 12:18

## 2024-06-11 NOTE — PROGRESS NOTE ADULT - TIME BILLING
DC time 50 min
- Ordering, reviewing, and interpreting labs, testing, and imaging.  - Independently obtaining a review of systems and performing a physical exam  - Reviewing consultant documentation/recommendations  - Counselling and educating patient and family regarding interpretation of aforementioned items and plan of care.
- Ordering, reviewing, and interpreting labs, testing, and imaging.  - Independently obtaining a review of systems and performing a physical exam  - Reviewing consultant documentation/recommendations in addition to discussing plan of care with consultants.  - Counselling and educating patient and family regarding interpretation of aforementioned items and plan of care.

## 2024-06-11 NOTE — DISCHARGE NOTE PROVIDER - PROVIDER TOKENS
PROVIDER:[TOKEN:[[id number]],FOLLOWUP:[1 week]],PROVIDER:[TOKEN:[[id number]],FOLLOWUP:[1 week]]

## 2024-06-11 NOTE — PROGRESS NOTE ADULT - SUBJECTIVE AND OBJECTIVE BOX
Patient is a 38y old  Male who presents with a chief complaint of liver transplant workup (11 Jun 2024 11:09)      SUBJECTIVE / OVERNIGHT EVENTS: pt feels ok, denies cp, sob, abdominal pain, had bm     MEDICATIONS  (STANDING):  amLODIPine   Tablet 5 milliGRAM(s) Oral daily  chlorhexidine 2% Cloths 1 Application(s) Topical <User Schedule>  folic acid 1 milliGRAM(s) Oral daily  haloperidol     Tablet 5 milliGRAM(s) Oral at bedtime  heparin   Injectable 5000 Unit(s) SubCutaneous every 12 hours  lactulose Syrup 20 Gram(s) Oral four times a day  LORazepam     Tablet 2 milliGRAM(s) Oral every 6 hours  multivitamin 1 Tablet(s) Oral daily  nicotine - 21 mG/24Hr(s) Patch 1 Patch Transdermal daily  pantoprazole    Tablet 40 milliGRAM(s) Oral before breakfast  QUEtiapine 25 milliGRAM(s) Oral at bedtime  rifAXIMin 550 milliGRAM(s) Oral two times a day  thiamine 100 milliGRAM(s) Oral daily    MEDICATIONS  (PRN):  haloperidol    Injectable 5 milliGRAM(s) IV Push every 6 hours PRN Agitation        CAPILLARY BLOOD GLUCOSE        I&O's Summary    10 Nirav 2024 07:01  -  11 Jun 2024 07:00  --------------------------------------------------------  IN: 360 mL / OUT: 1 mL / NET: 359 mL        PHYSICAL EXAM:  GENERAL: NAD, well-developed  HEAD:  Atraumatic, Normocephalic  EYES: EOMI, PERRLA, conjunctiva and sclera clear  NECK: Supple, No JVD  CHEST/LUNG: Clear to auscultation bilaterally; No wheeze  HEART: Regular rate and rhythm; No murmurs, rubs, or gallops  ABDOMEN: Soft, Nontender, Nondistended; Bowel sounds present  EXTREMITIES:  2+ Peripheral Pulses, No clubbing, cyanosis, or edema  PSYCH: AAOx3  NEUROLOGY: non-focal  SKIN: No rashes or lesions    LABS:                        10.8   11.04 )-----------( 233      ( 11 Jun 2024 07:17 )             32.4     06-11    139  |  106  |  15  ----------------------------<  93  3.7   |  20<L>  |  0.65    Ca    8.8      11 Jun 2024 07:22  Phos  2.0     06-11  Mg     1.8     06-11    TPro  7.4  /  Alb  2.8<L>  /  TBili  5.2<H>  /  DBili  x   /  AST  79<H>  /  ALT  101<H>  /  AlkPhos  289<H>  06-11          Urinalysis Basic - ( 11 Jun 2024 07:22 )    Color: x / Appearance: x / SG: x / pH: x  Gluc: 93 mg/dL / Ketone: x  / Bili: x / Urobili: x   Blood: x / Protein: x / Nitrite: x   Leuk Esterase: x / RBC: x / WBC x   Sq Epi: x / Non Sq Epi: x / Bacteria: x        RADIOLOGY & ADDITIONAL TESTS:    Imaging Personally Reviewed:    Consultant(s) Notes Reviewed:      Care Discussed with Consultants/Other Providers:

## 2024-06-11 NOTE — PROGRESS NOTE ADULT - PROBLEM SELECTOR PLAN 4
- Continue amlodipine for BP control; BPs currently acceptable

## 2024-06-11 NOTE — DISCHARGE NOTE NURSING/CASE MANAGEMENT/SOCIAL WORK - PATIENT PORTAL LINK FT
You can access the FollowMyHealth Patient Portal offered by [facility] by registering at the following website: [email or web address]. By joining [facility]’s FollowMyHealth portal, you will also be able to view your health information using other applications (apps) compatible with our system.

## 2024-06-11 NOTE — PROGRESS NOTE ADULT - PROVIDER SPECIALTY LIST ADULT
SICU
Transplant Surgery
Hepatology
Hepatology
Pharmacy
SICU
SICU
Transplant Hepatology
Transplant ID
Transplant Surgery
Hospitalist
SICU
SICU
Transplant Hepatology
Transplant ID
Transplant ID
Transplant Surgery
Transplant Surgery
SICU
Transplant Hepatology
Transplant Hepatology
Transplant ID
Transplant Surgery
Hospitalist
Internal Medicine
Hospitalist

## 2024-06-11 NOTE — DISCHARGE NOTE PROVIDER - CARE PROVIDERS DIRECT ADDRESSES
[email or web address]

## 2024-06-11 NOTE — DISCHARGE NOTE PROVIDER - NSDCFUADDAPPT_GEN_ALL_CORE_FT
You must follow up with your primary medical doctor within one week of discharge - please call to make an appointment.  If you do not have one, you can follow up with Dr. Robles - please call to make an appointment.    You must follow up with your Hepatologist, Dr. Peterson, within one week of discharge - please call to make an appointment.          APPTS ARE READY TO BE MADE: [X ] YES    Best Family or Patient Contact (if needed):    Additional Information about above appointments (if needed):    1: Primary medical doctor  2: Hepatologist  3:     Other comments or requests:    You must follow up with your primary medical doctor within one week of discharge - please call to make an appointment.  If you do not have one, you can follow up with Dr. Robles - please call to make an appointment.    You must follow up with your Hepatologist, Dr. Peterson, within one week of discharge - please call to make an appointment.                APPTS ARE READY TO BE MADE: [X ] YES    Best Family or Patient Contact (if needed):    Additional Information about above appointments (if needed):    1: Primary medical doctor  2: Hepatologist  3:     Other comments or requests:    You must follow up with your primary medical doctor within one week of discharge - please call to make an appointment.  If you do not have one, you can follow up with Dr. Robles - please call to make an appointment.    You must follow up with your Hepatologist, Dr. Peterson, within one week of discharge - please call to make an appointment.                APPTS ARE READY TO BE MADE: [X ] YES    Best Family or Patient Contact (if needed):    Additional Information about above appointments (if needed):    1: Primary medical doctor  2: Hepatologist  3:     Other comments or requests:     Transplant Hepatology: Appointment was scheduled by our team on the patient's behalf through the provider's office.   Dr. Frederick Peterson 06/18/2024 at 9:00 AM  [address] [place] [address]     You must follow up with your primary medical doctor within one week of discharge - please call to make an appointment.  If you do not have one, you can follow up with Dr. Robles - please call to make an appointment.    You must follow up with your Hepatologist, Dr. Peterson, within one week of discharge - please call to make an appointment.                APPTS ARE READY TO BE MADE: [X ] YES    Best Family or Patient Contact (if needed):    Additional Information about above appointments (if needed):    1: Primary medical doctor  2: Hepatologist  3:     Other comments or requests:     Internal Medicine: Appointment was scheduled by our team on the patient's behalf through the provider's office.  Dr. Mayo 06/24/ at 2:40 PM [phone] [address] [facility], [address] [facility] [place], [place], [place]    Transplant Hepatology: Appointment was scheduled by our team on the patient's behalf through the provider's office.  Dr. Frederick Peterson 06/18/2024 at 9:00 AM  [address] [place] [address]     You must follow up with your primary medical doctor within one week of discharge - please call to make an appointment.  If you do not have one, you can follow up with Dr. Robles - please call to make an appointment.    You must follow up with your Hepatologist, Dr. Peterson, within one week of discharge - please call to make an appointment.    APPTS ARE READY TO BE MADE: [X ] YES    Best Family or Patient Contact (if needed):    Additional Information about above appointments (if needed):    1: Primary medical doctor  2: Hepatologist  3:     Other comments or requests:     Internal Medicine: Appointment was scheduled by our team on the patient's behalf through the provider's office.  Dr. Mayo 06/24/ at 2:40 PM [phone] [address] [facility], [address] [facility], [place], [place]    Transplant Hepatology: Appointment was scheduled by our team on the patient's behalf through the provider's office.  Dr. Frederick Peterson 06/18/2024 at 9:00 AM  [address] [place] [address]     You must follow up with your primary medical doctor within one week of discharge - please call to make an appointment.  If you do not have one, you can follow up with Dr. Robles - please call to make an appointment.    You must follow up with your Hepatologist, Dr. Peterson, within one week of discharge - please call to make an appointment.    APPTS ARE READY TO BE MADE: [X ] YES    Best Family or Patient Contact (if needed):    Additional Information about above appointments (if needed):    1: Primary medical doctor  2: Hepatologist  3:     Other comments or requests:     Internal Medicine: Appointment was scheduled by our team on the patient's behalf through the provider's office.  Dr. Mayo 06/24/ at 2:40 PM [phone] [address] [facility], [address] [facility], [place], [place]    Transplant Hepatology: Appointment was scheduled by our team on the patient's behalf through the provider's office.  Dr. Frederick Peterson 06/18/2024 at 9:00 AM  [address] [place] [address]     Gastro: As per Gastro team when scheduling, no need to schedule if patient is already scheduled to see a Hepatologist.

## 2024-06-11 NOTE — PROGRESS NOTE ADULT - PROBLEM SELECTOR PROBLEM 1
Alcohol withdrawal delirium

## 2024-06-11 NOTE — PROGRESS NOTE ADULT - ASSESSMENT
38yoM hx HTN, ETOH use (daily drinker), PSH gastric sleeve, presenting half-way ETOH and Acetaminophen in acute alcohol withdrawal.

## 2024-06-11 NOTE — DISCHARGE NOTE NURSING/CASE MANAGEMENT/SOCIAL WORK - NSDCFUADDAPPT_GEN_ALL_CORE_FT
You must follow up with your primary medical doctor within one week of discharge - please call to make an appointment.  If you do not have one, you can follow up with Dr. Robles - please call to make an appointment.    You must follow up with your Hepatologist, Dr. Peterson, within one week of discharge - please call to make an appointment.                APPTS ARE READY TO BE MADE: [X ] YES    Best Family or Patient Contact (if needed):    Additional Information about above appointments (if needed):    1: Primary medical doctor  2: Hepatologist  3:     Other comments or requests:

## 2024-06-11 NOTE — PROGRESS NOTE ADULT - REASON FOR ADMISSION
liver transplant workup

## 2024-06-11 NOTE — DISCHARGE NOTE PROVIDER - NSDCMRMEDTOKEN_GEN_ALL_CORE_FT
amLODIPine 5 mg oral tablet: 1 tab(s) orally once a day  folic acid 1 mg oral tablet: 1 tab(s) orally once a day  lactulose 10 g/15 mL oral syrup: 30 milliliter(s) orally 4 times a day  Multiple Vitamins oral tablet: 1 tab(s) orally once a day  nicotine 21 mg/24 hr transdermal film, extended release: 1 patch transdermal once a day  rifAXIMin 550 mg oral tablet: 1 tab(s) orally 2 times a day  thiamine 100 mg oral tablet: 1 tab(s) orally once a day

## 2024-06-11 NOTE — PROGRESS NOTE ADULT - PROBLEM SELECTOR PROBLEM 2
Acute liver failure

## 2024-06-11 NOTE — PROGRESS NOTE ADULT - PROBLEM SELECTOR PLAN 1
- Pt refusing IV ativan   - On ativan 2mg po q6  - CIWA 1  - Taper down seroquel to 25mg qhs and then off   - high dose thiamine, folate, MV supplementation  -  follow up

## 2024-06-11 NOTE — DISCHARGE NOTE PROVIDER - NSFOLLOWUPCLINICS_GEN_ALL_ED_FT
[facility]  Gastroenterology  [address] [place] [address] [address]  [place] [address]  Phone: [phone]  Fax:

## 2024-06-11 NOTE — DISCHARGE NOTE NURSING/CASE MANAGEMENT/SOCIAL WORK - NSDCVIVACCINE_GEN_ALL_CORE_FT
HepB-CpG; 08-Jun-2024 17:06; Fely Virk (MANSOOR); [employer] [id number] (Exp. Date: 24-Jan-2025); IntraMuscular; Deltoid Left.; 20 MICROGram(s); VIS (VIS Published: 08-Jun-2024, VIS Presented: 08-Jun-2024);

## 2024-06-11 NOTE — DISCHARGE NOTE PROVIDER - NSDCCPCAREPLAN_GEN_ALL_CORE_FT
PRINCIPAL DISCHARGE DIAGNOSIS  Diagnosis: Liver failure, acute  Assessment and Plan of Treatment: You must follow up with your Hepatologist, Dr. Peterson, within one week - please call to make an appointment.  You must stop drinking alcohol.  Continue with Lactulose (3-4 bowel movements daily), and rifaximin.      SECONDARY DISCHARGE DIAGNOSES  Diagnosis: Alcohol withdrawal delirium  Assessment and Plan of Treatment: You must stop drinking alcohol.  You were given resources for outpatient rehab programs - please follow up with one of these programs.    Diagnosis: Hypertension  Assessment and Plan of Treatment: You must follow up with your primary medical doctor within one week of discharge.  You will take Amlodipine 5mg oral daily.  Low salt diet  Activity as tolerated.  Take all medication as prescribed.  Follow up with your medical doctor for routine blood pressure monitoring at your next visit.  Notify your doctor if you have any of the following symptoms:   Dizziness, Lightheadedness, Blurry vision, Headache, Chest pain, Shortness of breath

## 2024-06-11 NOTE — PROGRESS NOTE ADULT - PROBLEM SELECTOR PLAN 3
Episodes of fever; last episode on 6/5. No clear infectious source at this time.   - Blood cultures so far NGTD; last culture 6/4 no growth at 48 hrs   - UCx with normal jessica  - Continue meropenem and fluconazole for now (6/4 - ) per hepatology recs  - Transplant ID following
Episodes of fever; last episode on 6/5. No clear infectious source at this time.   - Blood cultures so far NGTD; last culture 6/4 no growth at 48 hrs   - UCx with normal jessica  - S/p meropenem and fluconazole   - Transplant ID following
Episodes of fever; last episode on 6/5. No clear infectious source at this time.   - Blood cultures so far NGTD; last culture 6/4 no growth at 48 hrs   - UCx with normal jessica  - S/p meropenem and fluconazole   - Transplant ID following
Episodes of fever; last episode on 6/5. No clear infectious source at this time.   - Blood cultures so far NGTD; last culture 6/4 no growth at 48 hrs  - Blood cultures drawn on 6/5 after fever still pending  - UCx with normal jessica  - Continue meropenem and fluconazole for now (6/4 - )  - Transplant ID following

## 2024-06-11 NOTE — DISCHARGE NOTE PROVIDER - HOSPITAL COURSE
38yoM hx HTN, ETOH use (daily drinker), PSH gastric sleeve, presenting [facility] ETOH and Acetaminophen in acute alcohol withdrawal.        Problem/Plan - 1:  ·  Problem: Alcohol withdrawal delirium.   ·  Plan: - Pt refusing IV ativan   - On ativan 2mg po q6  - CIWA 1  - Taper down seroquel to 25mg qhs - d/c on discharge  - high dose thiamine, folate, MV supplementation  - Continue constant observation for now-Psychiatry not requiring 1:1, it is for agitation nursing/patient safety.  -  follow up.     Problem/Plan - 2:  ·  Problem: Acute liver failure.   ·  Plan: ETOH and Tylenol per hepatology notes  S/p NAC (5/29-6/4) with improving liver injury. LFTs improving, Ammonia downtrended. Liver transplant evaluation now on hold given hepatic improvement  - Trend daily MELD labs   - Continue rifaximin  - Lactulose increased to 20mg QID, monitor stool count   - Hepatology following.     Problem/Plan - 3:  ·  Problem: Systemic inflammatory response syndrome (SIRS).   ·  Plan: Episodes of fever; last episode on 6/5. No clear infectious source at this time.   - Blood cultures so far NGTD; last culture 6/4 no growth at 48 hrs   - UCx with normal jessica  - S/p meropenem and fluconazole   - Transplant ID following.     Problem/Plan - 4:  ·  Problem: Hypertension.   ·  Plan: - Continue amlodipine for BP control; BPs currently acceptable.     38yoM hx HTN, ETOH use (daily drinker), PSH gastric sleeve, presenting senior living ETOH and Acetaminophen in acute alcohol withdrawal.        Problem/Plan - 1:  ·  Problem: Alcohol withdrawal delirium.   ·  Plan: - Pt refusing IV ativan   - On ativan 2mg po q6  - CIWA 1  - Taper down seroquel to 25mg qhs - d/c on discharge  - high dose thiamine, folate, MV supplementation  - Continue constant observation for now-Psychiatry not requiring 1:1, it is for agitation nursing/patient safety.  -  follow up.     Problem/Plan - 2:  ·  Problem: Acute liver failure.   ·  Plan: ETOH and Tylenol per hepatology notes  S/p NAC (5/29-6/4) with improving liver injury. LFTs improving, Ammonia downtrended. Liver transplant evaluation now on hold given hepatic improvement  - Trend daily MELD labs   - Continue rifaximin  - Lactulose increased to 20mg QID, monitor stool count   - Hepatology following.     Problem/Plan - 3:  ·  Problem: Systemic inflammatory response syndrome (SIRS).   ·  Plan: Episodes of fever; last episode on 6/5. No clear infectious source at this time.   - Blood cultures so far NGTD; last culture 6/4 no growth at 48 hrs   - UCx with normal jessica  - S/p meropenem and fluconazole   - Transplant ID following.     Problem/Plan - 4:  ·  Problem: Hypertension.   ·  Plan: - Continue amlodipine for BP control; BPs currently acceptable.    Patient medically cleared for discharge, by Dr. Rodriguez, with PCP and Hepatology follow up.

## 2024-06-11 NOTE — DISCHARGE NOTE PROVIDER - CARE PROVIDER_API CALL
Frederick Peterson  Transplant Hepatology  [address]  [place] [address]  Phone: [phone]  Fax: [phone]  Follow Up Time: 1 week    Marie Mayo  Internal Medicine  [address] [facility], [address]  [place] [address]  Phone: [phone]  Fax: [phone]  Follow Up Time: 1 week

## 2024-06-11 NOTE — PROGRESS NOTE ADULT - PROBLEM SELECTOR PLAN 2
ETOH and Tylenol per hepatology notes    S/p NAC (5/29-6/4) with improving liver injury. LFTs improving, Ammonia downtrended. Liver transplant evaluation now on hold given hepatic improvement  - Trend daily MELD labs   - Continue rifaximin  - Lactulose increased to 20mg TID, monitor stool count   - Hepatology following
ETOH and Tylenol per hepatology notes  S/p NAC (5/29-6/4) with improving liver injury. LFTs improving, Ammonia downtrended. Liver transplant evaluation now on hold given hepatic improvement  - Trend daily MELD labs   - Continue rifaximin  - Lactulose increased to 20mg QID, monitor stool count   - Hepatology following
ETOH and Tylenol per hepatology notes  S/p NAC (5/29-6/4) with improving liver injury. LFTs improving, Ammonia downtrended. Liver transplant evaluation now on hold given hepatic improvement  - Trend daily MELD labs   - Continue rifaximin  - Lactulose increased to 20mg QID, monitor stool count   - Hepatology following
ETOH and Tylenol per hepatology notes    S/p NAC (5/29-6/4) with improving liver injury. LFTs improving, Ammonia downtrended. Liver transplant evaluation now on hold given hepatic improvement  - Trend daily MELD labs   - Continue rifaximin  - Lactulose on hold given episodes of loose stools; Added back 6/7 for rising ammonia and no documented BMs  - Hepatology following
ETOH and Tylenol per hepatology notes  S/p NAC (5/29-6/4) with improving liver injury. LFTs improving, Ammonia downtrended. Liver transplant evaluation now on hold given hepatic improvement  - Trend daily MELD labs   - Continue rifaximin  - Lactulose increased to 20mg QID, monitor stool count   - Hepatology following
S/p NAC (5/29-6/4) with improving liver injury. LFTs improving, Ammonia downtrended. Liver transplant evaluation now on hold given hepatic improvement  - Trend daily MELD labs  - Daily lactate and ammonia levels  - Continue rifaximin  - Lactulose on hold given episodes of loose stools; monitor BMs; can restart once BMs normalize  - Hepatology following

## 2024-06-11 NOTE — PROGRESS NOTE ADULT - PROBLEM SELECTOR PROBLEM 3
Systemic inflammatory response syndrome (SIRS)
Patient/Caregiver provided printed discharge information.

## 2024-06-17 NOTE — CHART NOTE - NSCHARTNOTEFT_GEN_A_CORE
38yoM hx HTN, ETOH use (daily drinker), PSH gastric sleeve, initially admitted to [facility] for jaundice and confusion, BIBEMS as patient was wondering the streets. Noted to have significantly elevated LFTs (4000s), MELD 47, high CIWA, transfer for liver evaluation ISO acute alcoholic hepatitis. Here patient was started on NAC gtt and LFTs downtrended, NAC gtt now off. In setting of hepatic recovery, no plan to complete liver transplant evaluation for now per transplant hepatology service. Hospital course complicated by alcohol withdrawal on Ativan 4mg PO [id number] and Ativan 2mg IVP [id number] for breakthrough agitation. Patient requires constant observation and soft vest restraint which should be continued until patient no longer impulsive, discussed with floor JEAN.    Sign out given to hospitalist, floor PA Vane Jerome.     NEURO  - Nicotine patch for tobacco use   - Seroquel 200 in AM and 400mg qhs for agitation  - Lorazepam PO 4mg [id number] standing, 2mg [id number] IVP for breakthrough agitation  - Continue Xifaxan for hepatic encephalopathy   - Monitor for signs of withdrawal    RESP  - Saturating well on room air  - Maintain O2 saturation >92%    CV  - Continue home Norvasc 5mg daily with hold parameters for HTN  - 5/29 TTE wnl    GI/NUTRITION  - Diet: Reg  - Protonix PO daily for history of gastric sleeve  - Thiamine, folate in setting of ETOH use   - RUQ US 6/5 wnl    RENAL/  - Monitor Corbett output, can attempt TOV     HEME  - DVT ppx: SQH    ID  - On Meropenem, Diflucan for persistent leukocytosis and fever. Transplant ID following   - Follow-up repeat BCx sent 6/6  - 6/4, 6/2: BCx: no growth   - 5/29: BCx, UCx negative     ENDO  - Euglycemic     LINES  - PIVs
NUTRITION FOLLOW UP NOTE    PATIENT SEEN FOR: Nutrition Follow up     SOURCE: [x] Patient  [x] Current Medical Record  [] RN  [] Family/support person at bedside  [] Patient unavailable/inappropriate  [x] Other: PCA    CHART REVIEWED/EVENTS NOTED.  [] No changes to nutrition care plan to note  [x] Nutrition Status:  -Liver transplant workup   -Acute liver failure in the setting of decompensated alcoholic cirrhosis  -Resp: Saturating well on room air    -SUBJECTIVE INFORMATION:   -Pt unable to provide reliable history, needs redirection.  Pt reports having a good appetite and PO intake PTA. Per chart review, patient drinks ETOH daily (approximately 10 to 12 drinks per day), smokes two packs of cigarettes a day. Follows regular diet. Pt denies any known food allergies or intolerances. Pt denies any micronutrient supplementation at home. Denies any difficulty chewing/swallowing at this time. Hx of gastric sleeve.     LIVER TRANSPLANT EVAL:   -BMI: 23.1 - normal  -HbA1c: 5.3% - no DM detected   -Frailty: [pending PT evaluation]   -MELD: 25 ()  Pt reports he resides alone in an apartment. States he has someone who cooks for him daily. States his children would be able to assist with grocery shopping and meal preparation however per chart review, pt's children are ages 5 and 15. Per chart review, pt has a yehuda Crane who is surrogate decision maker.       DIET ORDER:   Diet, Regular:   Supplement Feeding Modality:  Oral  Ensure Max Cans or Servings Per Day:  1       Frequency:  Daily (24)      CURRENT DIET ORDER IS:  [x] Appropriate:  [] Inadequate:  [] Other:    NUTRITION INTAKE/PROVISION:  [x] PO: Pt reports good appetite/PO intake; endorses drinking Ensure Max shakes daily; PCA confirms. Pt requesting bread with butter at each meal.   [] Enteral Nutrition:  [] Parenteral Nutrition:    ANTHROPOMETRICS:  Drug Dosing Weight  Height (cm): 185.4 (29 May 2024 15:35)  Weight (kg): 79.5 (29 May 2024 15:35)  BMI (kg/m2): 23.1 (29 May 2024 15:35)  BSA (m2): 2.03 (29 May 2024 15:35)  Weights:   Daily Weight in k.4 ()     NUTRITIONALLY PERTINENT MEDICATIONS:  MEDICATIONS  (STANDING):  amLODIPine   Tablet  fluconAZOLE IVPB  fluconAZOLE IVPB  folic acid  insulin lispro (ADMELOG) corrective regimen sliding scale  insulin lispro (ADMELOG) corrective regimen sliding scale  meropenem  IVPB  meropenem  IVPB  multivitamin  pantoprazole    Tablet  rifAXIMin  thiamine       NUTRITIONALLY PERTINENT LABS:   Na139 mmol/L Glu 79 mg/dL K+ 4.4 mmol/L Cr  0.60 mg/dL BUN 9 mg/dL  Phos 2.5 mg/dL  Alb 3.0 g/dL<L>  Chol 83 mg/dL LDL --    HDL 16 mg/dL<L> Trig 110 mg/dL  U/L<H>  U/L<H> Alkaline Phosphatase 281 U/L<H>  24 @ 17:13 a1c 5.3    A1C with Estimated Average Glucose Result: 5.3 % (24 @ 17:13)          Finger Sticks:  POCT Blood Glucose.: 126 mg/dL ( @ 21:21)  POCT Blood Glucose.: 84 mg/dL ( @ 16:31)      NUTRITIONALLY PERTINENT MEDICATIONS/LABS:  [x] Reviewed  [x] Relevant notes on medications/labs:  -Ordered for multivitamin, folic acid, thiamine   -BG being managed with SSI.     EDEMA:  [x] Reviewed  [] Relevant notes:    GI/ I&O:  [x] Reviewed  [x] Relevant notes: Ordered for Lactulose.   [] Other:    SKIN:   [x] No pressure injuries documented, per nursing flowsheet  [] Pressure injury previously noted  [] Change in pressure injury documentation:  [] Other:    ESTIMATED NEEDS:  [x] No change:  [] Updated:  Energy:  9264-4698 kcal/day (27-32 kcal/kg)  Protein:  95..88 g/day (1.2-1.6 g/kg)  Fluid:   ml/day or [x] defer to team  Based on: current weight 79.3 kg     NUTRITION DIAGNOSIS:  [x] Prior Dx: Increased Nutrient Needs   [] New Dx:    EDUCATION:  [] Yes:  [x] Not appropriate/warranted    NUTRITION CARE PLAN:  1. Diet: regular diet   2. Supplements: Continue Ensure Max 1x/day  3. Multivitamin/mineral supplementation: multivitamin, folic acid, thiamine   4: Monitor PO intake, diet tolerance, weight trends, labs, GI function, and skin integrity    [] Achieved - Continue current nutrition intervention(s)  [] Current medical condition precludes nutrition intervention at this time.    MONITORING AND EVALUATION:   RD remains available upon request and will follow up per protocol.    Tess Baxter RDN CDN  Available on MS TEAMS
Post-Discharge Medication Review	  	  Patient's preferred pharmacy was updated in [facility]: [facility] 	  	  Patient contacted to offer medication counseling post-discharge. Medication reconciliation completed.     Per patient, medications include:	  1.	amLODIPine 5 mg oral tablet 1 tab(s) orally once a day  2.	folic acid 1 mg oral tablet 1 tab(s) orally once a day  3.	lactulose 10 g/15 mL oral syrup 30 milliliter(s) orally 4 times a day  4.	Multiple Vitamins oral tablet 1 tab(s) orally once a day  5.	nicotine 21 mg/24 hr transdermal film, extended release 1 patch transdermal once a day  6.	rifAXIMin 550 mg oral tablet 1 tab(s) orally 2 times a day  7.	thiamine 100 mg oral tablet 1 tab(s) orally once a day  	  Medication name, indication, administration, side effect, and monitoring reviewed for new medications during post discharge counseling visit with patient. Patient demonstrated understanding. Counseling offered for all medications.	  	  Joseph Guzman, Rober	  Clinical Pharmacy Specialist, Pharmacy Telehealth Team	  Can be reached via MS Teams or [phone]
Request from Dr. Rodriguez to facilitate patient discharge.  Medication reconciliation reviewed, revised, and resolved with Dr. Rodriguez, who has medically cleared patient for discharge with follow up as advised.  Please refer to discharge note for detailed hospital course.
Notified by RN, patient refusing IV medication, but agreeable to oral. According to chart review, patient was recently changed from standing PO Ativan to IV ativan due to patient "not reliably taking PO, sometimes hides it." Patient currently due for Ativan 2mg IVP as part of CIWA standing Ativan 2mg IVP q6h. Patient seen at bedside, in NAD, stating the IV medication is causing him pain in his arms. States this has been ongoing for the past 3 days. When asked to localize the pain, patient indicated that pain was throughout both of his upper forearms. B/l arms examined without redness or swelling noted. B/l UE IV sites examined with no signs of redness, swelling, however, tenderness to palpation at Left 22g IV insertion site. Left 22g IV to be removed by RN. Discussed with [facility]IC, will continue with current IV order, however, okay to hold currently due IV dose and give 1x PO Ativan equivalent for dose currently due. Ativan 2mg PO x1 ordered (confirmed with pharmacy for 1:1 PO to IV equivalency). RN to ensure PO med is taken appropriately by patient. As per [facility], if pt still refusing IV dose for next dose due at 6AM, okay to hold that IV dose and give additional 1x PO equivalent dose at that time. C/w CIWA. Will continue to monitor. Will endorse to day ACP for follow up with AM Hospitalist.     Alejandro Mandujano PA-C  Dept. of Medicine  Spectra [phone]

## 2024-06-18 ENCOUNTER — APPOINTMENT (OUTPATIENT)
Dept: HEPATOLOGY | Facility: CLINIC | Age: 39
End: 2024-06-18
Payer: MEDICAID

## 2024-06-18 VITALS
SYSTOLIC BLOOD PRESSURE: 132 MMHG | HEART RATE: 80 BPM | TEMPERATURE: 97.3 F | HEIGHT: 73 IN | RESPIRATION RATE: 16 BRPM | WEIGHT: 172 LBS | BODY MASS INDEX: 22.8 KG/M2 | OXYGEN SATURATION: 100 % | DIASTOLIC BLOOD PRESSURE: 85 MMHG

## 2024-06-18 PROCEDURE — 99214 OFFICE O/P EST MOD 30 MIN: CPT

## 2024-06-18 RX ORDER — GABAPENTIN 100 MG/1
100 CAPSULE ORAL TWICE DAILY
Qty: 30 | Refills: 0 | Status: ACTIVE | COMMUNITY
Start: 2024-06-18 | End: 1900-01-01

## 2024-06-19 LAB
ALBUMIN SERPL ELPH-MCNC: 3.6 G/DL
ALP BLD-CCNC: 170 U/L
ALT SERPL-CCNC: 59 U/L
ANION GAP SERPL CALC-SCNC: 13 MMOL/L
AST SERPL-CCNC: 53 U/L
BILIRUB SERPL-MCNC: 3.6 MG/DL
BUN SERPL-MCNC: 17 MG/DL
CALCIUM SERPL-MCNC: 9.7 MG/DL
CHLORIDE SERPL-SCNC: 104 MMOL/L
CO2 SERPL-SCNC: 23 MMOL/L
CREAT SERPL-MCNC: 0.67 MG/DL
EGFR: 123 ML/MIN/1.73M2
GLUCOSE SERPL-MCNC: 70 MG/DL
HCT VFR BLD CALC: 37.3 %
HGB BLD-MCNC: 11.9 G/DL
INR PPP: 1.06 RATIO
LYSOSOMAL ACID LIPASE INTERPRETATION: SIGNIFICANT CHANGE UP
LYSOSOMAL ACID LIPASE, RESULT: 30 CD:384473389 — LOW (ref 54–220)
MCHC RBC-ENTMCNC: 31.9 GM/DL
MCHC RBC-ENTMCNC: 32.1 PG
MCV RBC AUTO: 100.5 FL
PLATELET # BLD AUTO: 214 K/UL
POTASSIUM SERPL-SCNC: 4.1 MMOL/L
PROT SERPL-MCNC: 7.9 G/DL
PT BLD: 12 SEC
RBC # BLD: 3.71 M/UL
RBC # FLD: 14.8 %
SODIUM SERPL-SCNC: 140 MMOL/L
WBC # FLD AUTO: 7.65 K/UL

## 2024-06-19 NOTE — HISTORY OF PRESENT ILLNESS
[FreeTextEntry1] : Scleral icterus Awake and alert  Soft abdomen No tenderness   [de-identified] : He is here for follow up after an admission for severe ALI and alcoholic hepatitis.   Poor sleep  NKDA  Xifaxan Lactulose  Thiamine  MVI Folic acid  Amlodipine 5 mg  NKDA Sleeve gastrectomy 2021 Tummy tuck   AUD   Patient is here for follow-up after an admission to hospital for severe acute liver injury with ALT and AST as high as 3000, mental status changes, severe alcohol withdrawal requiring multiple days of hospitalization and initiation of transplant evaluation.  He stopped drinking and is not taking any medicine other than was prescribed by the inpatient team Occasional headache No nausea vomiting or diarrhea No rectal bleeding No confusion Review of systems otherwise unremarkable  Vital signs are stable Clear lungs with no rhonchi or crackle Regular heartbeats with no murmur Soft abdomen with a score of Tummy Tuck No edema of extremities

## 2024-06-19 NOTE — ASSESSMENT
[FreeTextEntry1] : Severe acute liver injury related to combination of acetaminophen, medications that he received from a local unauthorized store probably tetracycline, significant alcohol use. His condition significantly improved and at this point there is no indication for liver transplant listing or evaluation. Had a long conversation with him and emphasized on the importance of alcohol avoidance. We discussed the need for participation in an alcohol rehab program. Risks associated with drinking including liver failure and death were discussed He has headache and poor sleep and I prescribed low-dose gabapentin with full review of indication and potential side effects. He should follow-up with his primary care physician and establish care. Follow-up in 2 to 3 weeks

## 2024-06-21 ENCOUNTER — NON-APPOINTMENT (OUTPATIENT)
Age: 39
End: 2024-06-21

## 2024-06-24 ENCOUNTER — APPOINTMENT (OUTPATIENT)
Dept: INTERNAL MEDICINE | Facility: CLINIC | Age: 39
End: 2024-06-24
Payer: MEDICAID

## 2024-06-24 ENCOUNTER — NON-APPOINTMENT (OUTPATIENT)
Age: 39
End: 2024-06-24

## 2024-06-24 VITALS
TEMPERATURE: 99.1 F | SYSTOLIC BLOOD PRESSURE: 140 MMHG | DIASTOLIC BLOOD PRESSURE: 98 MMHG | HEART RATE: 91 BPM | WEIGHT: 174 LBS | BODY MASS INDEX: 23.06 KG/M2 | OXYGEN SATURATION: 98 % | HEIGHT: 73 IN

## 2024-06-24 VITALS — SYSTOLIC BLOOD PRESSURE: 138 MMHG | DIASTOLIC BLOOD PRESSURE: 85 MMHG

## 2024-06-24 DIAGNOSIS — Z12.9 ENCOUNTER FOR SCREENING FOR MALIGNANT NEOPLASM, SITE UNSPECIFIED: ICD-10-CM

## 2024-06-24 DIAGNOSIS — R79.89 OTHER SPECIFIED ABNORMAL FINDINGS OF BLOOD CHEMISTRY: ICD-10-CM

## 2024-06-24 DIAGNOSIS — D64.9 ANEMIA, UNSPECIFIED: ICD-10-CM

## 2024-06-24 DIAGNOSIS — R94.31 ABNORMAL ELECTROCARDIOGRAM [ECG] [EKG]: ICD-10-CM

## 2024-06-24 DIAGNOSIS — I10 ESSENTIAL (PRIMARY) HYPERTENSION: ICD-10-CM

## 2024-06-24 DIAGNOSIS — M48.061 SPINAL STENOSIS, LUMBAR REGION WITHOUT NEUROGENIC CLAUDICATION: ICD-10-CM

## 2024-06-24 DIAGNOSIS — Z13.228 ENCOUNTER FOR SCREENING FOR OTHER METABOLIC DISORDERS: ICD-10-CM

## 2024-06-24 PROCEDURE — 99495 TRANSJ CARE MGMT MOD F2F 14D: CPT

## 2024-06-24 PROCEDURE — 93000 ELECTROCARDIOGRAM COMPLETE: CPT

## 2024-06-24 RX ORDER — RIFAXIMIN 550 MG/1
550 TABLET ORAL TWICE DAILY
Refills: 0 | Status: ACTIVE | COMMUNITY
Start: 2024-06-24

## 2024-06-24 RX ORDER — FOLIC ACID 1 MG/1
1 TABLET ORAL DAILY
Qty: 90 | Refills: 1 | Status: ACTIVE | COMMUNITY
Start: 2024-06-24

## 2024-06-24 RX ORDER — ELECTROLYTES/DEXTROSE
SOLUTION, ORAL ORAL DAILY
Qty: 90 | Refills: 1 | Status: ACTIVE | COMMUNITY
Start: 2024-06-24

## 2024-06-24 RX ORDER — THIAMINE MONONITRATE (VIT B1) 100 MG
100 TABLET ORAL DAILY
Refills: 0 | Status: ACTIVE | COMMUNITY
Start: 2024-06-24

## 2024-06-24 RX ORDER — LACTULOSE 10 G/15ML
10 SOLUTION ORAL 4 TIMES DAILY
Refills: 0 | Status: ACTIVE | COMMUNITY
Start: 2024-06-24

## 2024-06-24 RX ORDER — AMLODIPINE BESYLATE 5 MG/1
5 TABLET ORAL
Qty: 90 | Refills: 0 | Status: ACTIVE | COMMUNITY
Start: 2019-04-17

## 2024-06-25 LAB
25(OH)D3 SERPL-MCNC: 20.4 NG/ML
ALBUMIN SERPL ELPH-MCNC: 4.1 G/DL
ALP BLD-CCNC: 139 U/L
ALT SERPL-CCNC: 37 U/L
ANION GAP SERPL CALC-SCNC: 12 MMOL/L
APPEARANCE: CLEAR
AST SERPL-CCNC: 39 U/L
BACTERIA: NEGATIVE /HPF
BASOPHILS # BLD AUTO: 0.06 K/UL
BASOPHILS NFR BLD AUTO: 0.7 %
BILIRUB SERPL-MCNC: 2.5 MG/DL
BILIRUBIN URINE: NEGATIVE
BLOOD URINE: NEGATIVE
BUN SERPL-MCNC: 16 MG/DL
CALCIUM SERPL-MCNC: 10.2 MG/DL
CAST: 2 /LPF
CHLORIDE SERPL-SCNC: 104 MMOL/L
CHOLEST SERPL-MCNC: 178 MG/DL
CK SERPL-CCNC: 62 U/L
CO2 SERPL-SCNC: 22 MMOL/L
COLOR: YELLOW
CREAT SERPL-MCNC: 0.75 MG/DL
CREAT SPEC-SCNC: 179 MG/DL
EGFR: 119 ML/MIN/1.73M2
EOSINOPHIL # BLD AUTO: 0.2 K/UL
EOSINOPHIL NFR BLD AUTO: 2.3 %
EPITHELIAL CELLS: 0 /HPF
ESTIMATED AVERAGE GLUCOSE: 91 MG/DL
FERRITIN SERPL-MCNC: 502 NG/ML
FOLATE SERPL-MCNC: 16.4 NG/ML
GLUCOSE QUALITATIVE U: NEGATIVE MG/DL
GLUCOSE SERPL-MCNC: 84 MG/DL
HBA1C MFR BLD HPLC: 4.8 %
HCT VFR BLD CALC: 38.3 %
HDLC SERPL-MCNC: 80 MG/DL
HGB BLD-MCNC: 12.9 G/DL
IMM GRANULOCYTES NFR BLD AUTO: 0.6 %
IRON SATN MFR SERPL: 25 %
IRON SERPL-MCNC: 87 UG/DL
KETONES URINE: NEGATIVE MG/DL
LDLC SERPL CALC-MCNC: 84 MG/DL
LEUKOCYTE ESTERASE URINE: NEGATIVE
LYMPHOCYTES # BLD AUTO: 2.85 K/UL
LYMPHOCYTES NFR BLD AUTO: 32.3 %
MAN DIFF?: NORMAL
MCHC RBC-ENTMCNC: 32.9 PG
MCHC RBC-ENTMCNC: 33.7 GM/DL
MCV RBC AUTO: 97.7 FL
MICROALBUMIN 24H UR DL<=1MG/L-MCNC: <1.2 MG/DL
MICROALBUMIN/CREAT 24H UR-RTO: NORMAL MG/G
MICROSCOPIC-UA: NORMAL
MONOCYTES # BLD AUTO: 0.73 K/UL
MONOCYTES NFR BLD AUTO: 8.3 %
NEUTROPHILS # BLD AUTO: 4.94 K/UL
NEUTROPHILS NFR BLD AUTO: 55.8 %
NITRITE URINE: NEGATIVE
NONHDLC SERPL-MCNC: 98 MG/DL
PH URINE: 5.5
PLATELET # BLD AUTO: 315 K/UL
POTASSIUM SERPL-SCNC: 4.4 MMOL/L
PROT SERPL-MCNC: 7.9 G/DL
PROTEIN URINE: NEGATIVE MG/DL
PSA SERPL-MCNC: 0.52 NG/ML
RBC # BLD: 3.92 M/UL
RBC # FLD: 14.8 %
RED BLOOD CELLS URINE: 2 /HPF
SODIUM SERPL-SCNC: 138 MMOL/L
SPECIFIC GRAVITY URINE: 1.03
T4 FREE SERPL-MCNC: 1.2 NG/DL
TIBC SERPL-MCNC: 349 UG/DL
TRIGL SERPL-MCNC: 77 MG/DL
TSH SERPL-ACNC: 1.05 UIU/ML
UIBC SERPL-MCNC: 262 UG/DL
URATE SERPL-MCNC: 5.7 MG/DL
UROBILINOGEN URINE: 0.2 MG/DL
VIT B12 SERPL-MCNC: 561 PG/ML
WBC # FLD AUTO: 8.83 K/UL
WHITE BLOOD CELLS URINE: 0 /HPF

## 2024-06-25 RX ORDER — ERGOCALCIFEROL 1.25 MG/1
1.25 MG CAPSULE, LIQUID FILLED ORAL
Qty: 8 | Refills: 0 | Status: ACTIVE | COMMUNITY
Start: 2024-06-25 | End: 1900-01-01

## 2024-07-02 ENCOUNTER — APPOINTMENT (OUTPATIENT)
Dept: HEPATOLOGY | Facility: CLINIC | Age: 39
End: 2024-07-02
Payer: MEDICAID

## 2024-07-02 VITALS
HEART RATE: 86 BPM | BODY MASS INDEX: 23.59 KG/M2 | SYSTOLIC BLOOD PRESSURE: 153 MMHG | RESPIRATION RATE: 16 BRPM | DIASTOLIC BLOOD PRESSURE: 96 MMHG | WEIGHT: 178 LBS | TEMPERATURE: 98.3 F | OXYGEN SATURATION: 98 % | HEIGHT: 73 IN

## 2024-07-02 DIAGNOSIS — R79.89 OTHER SPECIFIED ABNORMAL FINDINGS OF BLOOD CHEMISTRY: ICD-10-CM

## 2024-07-02 PROCEDURE — 99213 OFFICE O/P EST LOW 20 MIN: CPT

## 2024-10-01 ENCOUNTER — EMERGENCY (EMERGENCY)
Facility: HOSPITAL | Age: 39
LOS: 1 days | End: 2024-10-01
Attending: STUDENT IN AN ORGANIZED HEALTH CARE EDUCATION/TRAINING PROGRAM
Payer: MEDICAID

## 2024-10-01 VITALS
RESPIRATION RATE: 18 BRPM | DIASTOLIC BLOOD PRESSURE: 92 MMHG | OXYGEN SATURATION: 98 % | HEART RATE: 74 BPM | TEMPERATURE: 98 F | SYSTOLIC BLOOD PRESSURE: 167 MMHG

## 2024-10-01 VITALS
HEIGHT: 75 IN | TEMPERATURE: 98 F | HEART RATE: 90 BPM | RESPIRATION RATE: 18 BRPM | DIASTOLIC BLOOD PRESSURE: 115 MMHG | OXYGEN SATURATION: 97 % | SYSTOLIC BLOOD PRESSURE: 170 MMHG | WEIGHT: 173.06 LBS

## 2024-10-01 DIAGNOSIS — Z90.3 ACQUIRED ABSENCE OF STOMACH [PART OF]: Chronic | ICD-10-CM

## 2024-10-01 LAB
ALBUMIN SERPL ELPH-MCNC: 4.1 G/DL — SIGNIFICANT CHANGE UP (ref 3.3–5.2)
ALP SERPL-CCNC: 114 U/L — SIGNIFICANT CHANGE UP (ref 40–120)
ALT FLD-CCNC: 53 U/L — HIGH
ANION GAP SERPL CALC-SCNC: 13 MMOL/L — SIGNIFICANT CHANGE UP (ref 5–17)
APTT BLD: 33.8 SEC — SIGNIFICANT CHANGE UP (ref 24.5–35.6)
AST SERPL-CCNC: 48 U/L — HIGH
BASOPHILS # BLD AUTO: 0.02 K/UL — SIGNIFICANT CHANGE UP (ref 0–0.2)
BASOPHILS NFR BLD AUTO: 0.3 % — SIGNIFICANT CHANGE UP (ref 0–2)
BILIRUB SERPL-MCNC: 0.3 MG/DL — LOW (ref 0.4–2)
BLD GP AB SCN SERPL QL: SIGNIFICANT CHANGE UP
BUN SERPL-MCNC: 18.3 MG/DL — SIGNIFICANT CHANGE UP (ref 8–20)
CALCIUM SERPL-MCNC: 9.1 MG/DL — SIGNIFICANT CHANGE UP (ref 8.4–10.5)
CHLORIDE SERPL-SCNC: 102 MMOL/L — SIGNIFICANT CHANGE UP (ref 96–108)
CO2 SERPL-SCNC: 24 MMOL/L — SIGNIFICANT CHANGE UP (ref 22–29)
CREAT SERPL-MCNC: 0.78 MG/DL — SIGNIFICANT CHANGE UP (ref 0.5–1.3)
EGFR: 117 ML/MIN/1.73M2 — SIGNIFICANT CHANGE UP
EOSINOPHIL # BLD AUTO: 0.11 K/UL — SIGNIFICANT CHANGE UP (ref 0–0.5)
EOSINOPHIL NFR BLD AUTO: 1.4 % — SIGNIFICANT CHANGE UP (ref 0–6)
GLUCOSE SERPL-MCNC: 89 MG/DL — SIGNIFICANT CHANGE UP (ref 70–99)
HCT VFR BLD CALC: 39.1 % — SIGNIFICANT CHANGE UP (ref 39–50)
HGB BLD-MCNC: 13.1 G/DL — SIGNIFICANT CHANGE UP (ref 13–17)
IMM GRANULOCYTES NFR BLD AUTO: 0.4 % — SIGNIFICANT CHANGE UP (ref 0–0.9)
INR BLD: 0.98 RATIO — SIGNIFICANT CHANGE UP (ref 0.85–1.16)
LYMPHOCYTES # BLD AUTO: 2.72 K/UL — SIGNIFICANT CHANGE UP (ref 1–3.3)
LYMPHOCYTES # BLD AUTO: 35 % — SIGNIFICANT CHANGE UP (ref 13–44)
MCHC RBC-ENTMCNC: 30.1 PG — SIGNIFICANT CHANGE UP (ref 27–34)
MCHC RBC-ENTMCNC: 33.5 GM/DL — SIGNIFICANT CHANGE UP (ref 32–36)
MCV RBC AUTO: 89.9 FL — SIGNIFICANT CHANGE UP (ref 80–100)
MONOCYTES # BLD AUTO: 0.71 K/UL — SIGNIFICANT CHANGE UP (ref 0–0.9)
MONOCYTES NFR BLD AUTO: 9.1 % — SIGNIFICANT CHANGE UP (ref 2–14)
NEUTROPHILS # BLD AUTO: 4.18 K/UL — SIGNIFICANT CHANGE UP (ref 1.8–7.4)
NEUTROPHILS NFR BLD AUTO: 53.8 % — SIGNIFICANT CHANGE UP (ref 43–77)
PLATELET # BLD AUTO: 144 K/UL — LOW (ref 150–400)
POTASSIUM SERPL-MCNC: 3.8 MMOL/L — SIGNIFICANT CHANGE UP (ref 3.5–5.3)
POTASSIUM SERPL-SCNC: 3.8 MMOL/L — SIGNIFICANT CHANGE UP (ref 3.5–5.3)
PROT SERPL-MCNC: 7 G/DL — SIGNIFICANT CHANGE UP (ref 6.6–8.7)
PROTHROM AB SERPL-ACNC: 11.4 SEC — SIGNIFICANT CHANGE UP (ref 9.9–13.4)
RBC # BLD: 4.35 M/UL — SIGNIFICANT CHANGE UP (ref 4.2–5.8)
RBC # FLD: 12.8 % — SIGNIFICANT CHANGE UP (ref 10.3–14.5)
SODIUM SERPL-SCNC: 139 MMOL/L — SIGNIFICANT CHANGE UP (ref 135–145)
WBC # BLD: 7.77 K/UL — SIGNIFICANT CHANGE UP (ref 3.8–10.5)
WBC # FLD AUTO: 7.77 K/UL — SIGNIFICANT CHANGE UP (ref 3.8–10.5)

## 2024-10-01 PROCEDURE — 99284 EMERGENCY DEPT VISIT MOD MDM: CPT | Mod: 25

## 2024-10-01 PROCEDURE — 73590 X-RAY EXAM OF LOWER LEG: CPT | Mod: 26,RT

## 2024-10-01 PROCEDURE — 73610 X-RAY EXAM OF ANKLE: CPT | Mod: 26,RT

## 2024-10-01 PROCEDURE — 86900 BLOOD TYPING SEROLOGIC ABO: CPT

## 2024-10-01 PROCEDURE — 73590 X-RAY EXAM OF LOWER LEG: CPT

## 2024-10-01 PROCEDURE — 85610 PROTHROMBIN TIME: CPT

## 2024-10-01 PROCEDURE — 72148 MRI LUMBAR SPINE W/O DYE: CPT | Mod: MC

## 2024-10-01 PROCEDURE — 72148 MRI LUMBAR SPINE W/O DYE: CPT | Mod: 26,MC

## 2024-10-01 PROCEDURE — 96374 THER/PROPH/DIAG INJ IV PUSH: CPT

## 2024-10-01 PROCEDURE — 85025 COMPLETE CBC W/AUTO DIFF WBC: CPT

## 2024-10-01 PROCEDURE — 99285 EMERGENCY DEPT VISIT HI MDM: CPT

## 2024-10-01 PROCEDURE — 73630 X-RAY EXAM OF FOOT: CPT

## 2024-10-01 PROCEDURE — 85730 THROMBOPLASTIN TIME PARTIAL: CPT

## 2024-10-01 PROCEDURE — 36415 COLL VENOUS BLD VENIPUNCTURE: CPT

## 2024-10-01 PROCEDURE — 86901 BLOOD TYPING SEROLOGIC RH(D): CPT

## 2024-10-01 PROCEDURE — 96375 TX/PRO/DX INJ NEW DRUG ADDON: CPT

## 2024-10-01 PROCEDURE — 73630 X-RAY EXAM OF FOOT: CPT | Mod: 26,RT

## 2024-10-01 PROCEDURE — 73610 X-RAY EXAM OF ANKLE: CPT

## 2024-10-01 PROCEDURE — 80053 COMPREHEN METABOLIC PANEL: CPT

## 2024-10-01 PROCEDURE — 86850 RBC ANTIBODY SCREEN: CPT

## 2024-10-01 RX ORDER — KETOROLAC TROMETHAMINE 10 MG/1
30 TABLET, FILM COATED ORAL ONCE
Refills: 0 | Status: DISCONTINUED | OUTPATIENT
Start: 2024-10-01 | End: 2024-10-01

## 2024-10-01 RX ADMIN — KETOROLAC TROMETHAMINE 30 MILLIGRAM(S): 10 TABLET, FILM COATED ORAL at 22:53

## 2024-10-01 RX ADMIN — Medication 102 MILLIGRAM(S): at 22:54

## 2024-10-01 RX ADMIN — Medication 1500 MILLIGRAM(S): at 22:53

## 2024-10-01 NOTE — ED ADULT NURSE NOTE - CHIEF COMPLAINT QUOTE
PT presents to ED for right ankle pain. Rolled ankle 4 days ago. c/o severe pain to calf. States foot went "dead" today unable to flex foot upward

## 2024-10-01 NOTE — ED ADULT TRIAGE NOTE - ARRIVAL FROM
----- Message from Daniel Marin sent at 9/14/2020  8:31 AM EDT -----  Regarding: /Telephone  General Message/Vendor Calls    Caller's first and last name: Zuly Andersendavidnaomi      Reason for call:lab orders      Callback required yes/no and why:yes      Best contact number(s):[phone]      Details to clarify the request:Pt requesting labs orders .       Daniel [facility]

## 2024-10-01 NOTE — ED PROVIDER NOTE - ATTENDING APP SHARED VISIT CONTRIBUTION OF CARE
I have personally performed a history and physical examination of the patient and discussed management with the JEAN as well as the patient.  I reviewed the JEAN's note and agree with the documented findings and plan of care.  I have authored and modified critical sections of the Provider Note, including but not limited to HPI, Physical Exam and MDM.    HPI: 39-year-old male past medical history of sciatica and known herniated disc presents status post fall 5 days ago and complaining of loss of function of the right lower extremity.  States that he twisted his right ankle when he fell over the course that time he has been having increased sensation loss of the right lower extremity leading to inability to lift his foot off the ground today.  Patient states he is having trouble dorsiflexing his foot.  Denies loss of bowel or bladder control.  No incontinence.  No fevers, IVDU, or prior back surgeries.  No other current complaints at this time.    ROS:   General: No fever, no chills, no malaise, no fatigue  Respiratory: No cough, no dyspnea, no pleuritic chest pain  Cardiac: no chest pain, no palpitations, no edema, no jvd  Abdomen: No abdominal pain, no nausea, no vomiting, no diarrhea  : No dysuria, no increase frequency, no urgency, No discharge  Musculoskeletal: No myalgia, no arthralgia  Neurologic: No headache, no vertigo, no paresthesia, see HPI  Skin: No rash, no evidence of trauma  All other ROS are negative    PE:  General: NAD; A&O x3   Head: NC/AT  Eyes: PERRL, EOMI  ENT: Airway patent, mmm,  Back: Normal  spine no step-offs or deformities, passive straight leg raise positive at 20 degrees on the right  Extremities: Decreased dorsiflexion of the right foot, decreased hip flexion on the right lower extremity, worsening sciatic pain with bending of the right knee.  4/5 strength throughout right lower extremity otherwise remainder of extremities FROM, symmetric pulses, capillary refill < 2 seconds, no edema, 5/5 strength in b/l UE and LLE  Skin: no rash or bruising  Neurologic: alert, speech clear, CN II-XII grossly intact, normal gait, decreased sensation along the inner right thigh extending into the proximal right lower leg, remainder of sensation grossly intact  Psych: nl mood/affect, nl insight.    MDM: 39-year-old male past medical history of sciatica and known herniated disc presents status post fall 5 days ago and complaining of loss of function of the right lower extremity.  Possible muscle spasm compounding sciatica.  Concern for acute cord etiology given recent fall and known sciatica.  Patient sent for stat MRI.  Case discussed with neurosurgery and pending bedside evaluation.  Will provide symptomatic control as needed.  Disposition pending results and recommendations.

## 2024-10-01 NOTE — ED ADULT NURSE NOTE - OBJECTIVE STATEMENT
pt AOx4, breathing even and unlabored. assumed care 2230. pt presents to ED right ankle pain. states Rolled ankle 4 days ago. c/o severe pain to calf. States foot went "dead" today unable to flex foot upward. labs sent, meds given, pending CT.

## 2024-10-01 NOTE — ED PROVIDER NOTE - PROGRESS NOTE DETAILS
Jada Durant PA :   Initial Xrays no acute findings.  Brought for stat MRI and neuro sx consulted given new foot drop- advised to also obtain CT L-spine as well.  Pt was seen by neurosx PA after MRI completed. Called neurosurgery PA after preliminary results available. State she would review and speak with attending.  Note seen in chart @00:55 and called neurosx PA to clarify recs- spoke at 00:58 she advised to keep pt in ED and await MRI final results, get AFO/TLSO brace in AM, and neurosurgery attending to see in AM. asked if she could contact attending sooner, was told will see in AM.   Pt does not want to stay overnight. Attempted multiple times to explain reasoning and plan to the best of my ability. Pt very agitated and upset over situation. Called neurosdaniel PA again at 1:23 to have her come back down and speak to patient regarding his MRI results and plan.   When I informed pt that they would come back down to speak with him he was not willing to wait and requested to leave. IV removed, did not wait for paper work. ACC: [id number] EXAM:  MR SPINE LUMBAR   ORDERED BY: TYLER AZUL   PROCEDURE DATE:  10/01/2024    ******PRELIMINARY REPORT******    ******PRELIMINARY REPORT******   INTERPRETATION:  [facility] RADIOLOGIST PRELIMINARY REPORT  PROCEDURE INFORMATION:  Exam: MR Lumbar Spine Without Contrast  Exam date and time: 10/1/2024 10:25 PM  Age: 39 years old  Clinical indication: HX sciatica, new onset right foot weakness  TECHNIQUE:  Imaging protocol: Magnetic resonance imaging of the lumbar spine without  contrast.  COMPARISON:  CT ABDOMEN AND PELVIS WITH IV CONTRAST 5/29/2024 11:35 AM  FINDINGS:  Bones/joints: Lumbar alignment is normal and there is gross preservation   of vertebral body height throughout the lumbar spine with no vertebral body  fractures or significant subluxations detected. No significant marrow   replacinglesions are seen at lumbar levels.  Spinal cord:  The distal cord and conus are normal in configuration and   signal characteristics.  T12-L1: No disc bulge/protrusion, canal stenosis or foraminal narrowing  detected.  L1-L2: No disc bulge/protrusion, canal stenosis or foraminal narrowing  detected.  L2-L3: No disc bulge/protrusion, canal stenosis or foraminal narrowing  detected.  L3-L4: No disc bulge/protrusion, canal stenosis or foraminal narrowing  detected.  L4-L5: There is disc desiccation and posterior disc bulging/protrusion   with  associated annular fissure indents the ventral thecal sac and produces   severe  canal stenosis (AP canal dimension less than 6 mm) and effacement of the  subarticular recesses with suspected mass-effect upon traversing right   and left  L5 roots (see image 51, series 7).  L5-S1:  There is disc desiccation and broad-based posterior disc bulging   is  slightly more prominent to the right of midline, producing mass effect   upon the  traversing right S1 root without significant central canal stenosis (see   image  60, series 7).  Foraminal disc bulging results in left foraminal   narrowing at  L5-S1 with the right neural foramina adequate at this level.  Soft tissues: Unremarkable. IMPRESSION:  1. Posterior disc bulging/protrusion with associated annular fissure   indents  the ventral thecal sac and produces severe canal stenosis (AP canal   dimension  less than 6 mm) and effacement of the subarticular recesses with suspected  mass-effect upon traversing right and left L5 roots (see image 51, series   7).  2. Broad-based posterior disc bulging is slightly more prominent to the   right  of midline at L5-S1, producing mass effect upon the traversing right S1   root  without significant central canal stenosis (see image 60, series 7).    Foraminal  disc bulging results in left foraminal narrowing at L5-S1 with the right   neural  foramina adequate at this level.

## 2024-10-02 PROCEDURE — 99282 EMERGENCY DEPT VISIT SF MDM: CPT

## 2024-10-02 NOTE — BH CONSULTATION LIAISON PROGRESS NOTE - NSBHPSYCHOLCOGORIENT_PSY_A_CORE
[facility] has auth and can take Pt once medically ready. Auth is good until Friday 10/4 NP notified    10/02/24 1102   Post-Acute Status   Post-Acute Authorization Placement   Post-Acute Placement Status Set-up Complete/Auth obtained   Discharge Delays None known at this time   Discharge Plan   Discharge Plan A Rehab   Discharge Plan B Rehab       
Not fully oriented...

## 2024-10-02 NOTE — CONSULT NOTE ADULT - SUBJECTIVE AND OBJECTIVE BOX
NEUROSURGERY PA CONSULT NOTE:    Patient is a 39y old  Male who presents with a chief complaint of Right foot weakness and decreased sensation    HPI:  40 yo M with hx of sciatica, chronic low back pain, herniated disc (level unknown) presents after mechanical fall 5 days ago and since the fall develop difficulty flexing his foot, causing difficulty driving. When he fell he reports twisting ankle and foot.   Today pt developed tingling in foot and total loss of sensation while in a store which made him fall. Pt regained some sensation a few minutes later but still feels sensation is decreased.    +Right foot tingling, numbness “feels like ants crawling”, +weakness in right foot. Denies saddle anesthesia, difficulty/issues urinating. Denies pain at foot and ankle.    Pt also reporting back pain which he has as base line due to his history. Pain is worse with movement but improved with rest. Pt also reporting Right hip pain with flexion.    Of note: has had injections for sciatica in the past but has not f/u with some in years and has not had a severe fair up in a while     PAST MEDICAL & SURGICAL HISTORY:  Hypertension, unspecified type  H/O gastric sleeve    SOCIAL HISTORY:  Tobacco Use: +hx of smoking    Allergies  No Known Allergies  Intolerances    REVIEW OF SYSTEMS  Negative except as noted in HPI    Vital Signs Last 24 Hrs  T(C): 36.7 (01 Oct 2024 23:46), Max: 36.9 (01 Oct 2024 19:23)  T(F): 98.1 (01 Oct 2024 23:46), Max: 98.5 (01 Oct 2024 19:23)  HR: 74 (01 Oct 2024 23:46) (74 - 90)  BP: 167/92 (01 Oct 2024 23:46) (158/94 - 170/115)  BP(mean): --  RR: 18 (01 Oct 2024 23:46) (18 - 18)  SpO2: 98% (01 Oct 2024 23:46) (97% - 98%)  Parameters below as of 01 Oct 2024 23:46  Patient On (Oxygen Delivery Method): room air    PHYSICAL EXAM:  GENERAL: NAD, well-groomed  HEAD:  Atraumatic, normocephalic  MENTAL STATUS: AAO x3; Awake; Opens eyes spontaneously; Appropriately conversant without aphasia; following commands  MOTOR: back non tender to palpation. Left LE and UE 5/5 and normal sensation.    Right LE: HE 5/5, KE and KF 5/5, dorsiflex 1/5, PF 4/5, Hallux extension 1/5, flexion 4/5.    Sensation decreased over dorsum of foot. Plantar and rest leg’s sensation intact.        LABS:                        13.1   7.77  )-----------( 144      ( 01 Oct 2024 22:50 )             39.1     10-01    139  |  102  |  18.3  ----------------------------<  89  3.8   |  24.0  |  0.78    Ca    9.1      01 Oct 2024 22:50    TPro  7.0  /  Alb  4.1  /  TBili  0.3[L]  /  DBili  x   /  AST  48[H]  /  ALT  53[H]  /  AlkPhos  114  10-01    PT/INR - ( 01 Oct 2024 22:50 )   PT: 11.4 sec;   INR: 0.98 ratio       PTT - ( 01 Oct 2024 22:50 )  PTT:33.8 sec  Urinalysis Basic - ( 01 Oct 2024 22:50 )    Color: x / Appearance: x / SG: x / pH: x  Gluc: 89 mg/dL / Ketone: x  / Bili: x / Urobili: x   Blood: x / Protein: x / Nitrite: x   Leuk Esterase: x / RBC: x / WBC x   Sq Epi: x / Non Sq Epi: x / Bacteria: x    RADIOLOGY & ADDITIONAL STUDIES:  < from: MR Lumbar Spine No Cont (10.01.24 @ 22:42) >  IMPRESSION:  1. Posterior disc bulging/protrusion with associated annular fissure   indents the ventral thecal sac and produces severe canal stenosis (AP canal   dimension less than 6 mm) and effacement of the subarticular recesses with suspected  mass-effect upon traversing right and left L5 roots (see image 51, series   7).  2. Broad-based posterior disc bulging is slightly more prominent to the   right of midline at L5-S1, producing mass effect upon the traversing right S1   root without significant central canal stenosis (see image 60, series 7).    Foraminal disc bulging results in left foraminal narrowing at L5-S1 with the right   neural foramina adequate at this level.    < end of copied text >    Xrays of foot pending

## 2024-10-02 NOTE — CONSULT NOTE ADULT - ASSESSMENT
Assessment: 38 yo M with L4-5, L5-S1 disc herniation causing foot drop.    MRI without cord signal changes.      Plan:    - CT L since had 2 falls and with back pain   - pain control avoid over sedation   - AFO brace, LSO for comfort   - Out pt neurosurgical f/u and PT    - no surgical intervention at this time      Case and plan to be discussed with Dr. Pizano  Assessment: 38 yo M with L4-5, L5-S1 disc herniation causing foot drop.    MRI without cord signal changes. Final report of MRI pending    Plan:    - pain control avoid over sedation   - AFO brace, LSO for comfort   - Out pt neurosurgical f/u and PT    - no surgical intervention at this time    Case and plan to be discussed with Dr. Pizano

## 2024-11-05 ENCOUNTER — APPOINTMENT (OUTPATIENT)
Dept: HEPATOLOGY | Facility: CLINIC | Age: 39
End: 2024-11-05

## 2024-11-19 ENCOUNTER — APPOINTMENT (OUTPATIENT)
Dept: FAMILY MEDICINE | Facility: CLINIC | Age: 39
End: 2024-11-19

## 2024-11-26 NOTE — ED ADULT NURSE NOTE - NS ED NURSE NOTIFICATIONS
Patient of Dr. Chaney last seen in the [place] office on 11/19/24 for a cystoscopy called in with concerns of hematuria that started this morning with small clots. Reports being bright red in color and continuous. Also reports having urinary frequency. Denies any fevers or difficulty urinating. Advised to increase water intake and monitor for worsening symptoms. ED precautions reviewed. Placed urine orders to rule out infection. Please advise on further recommendations.       Reason for Disposition   Patient presents with gross hematuria with small clots and able to urinate, new onset, with or without history of bladder cancer    Answer Assessment - Initial Assessment Questions  1. When did you start with blood in your urine, and can you describe things in detail? Do you have any blood clots, is the hematuria continuous or intermittent and can you describe the color of the blood in your urine in relation to a beverage?   This morning, bright red, continuous   2. Do you have lower back, flank, or lower abdominal/bladder pain?   No   3. Are you experiencing urinary frequency, urgency, pain or burning or any other changes in your urination like being unable to urinate?   Incontinence, frequency   4. Do you take any blood thinners?   No  6. Have you had any recent urine testing or imaging? (UA with micro, urine culture, kidney ultrasound, CT scan)? Or any recent urologic surgery or procedures?   Cystoscopy last week    Protocols used: Urology-Gross Hemaruria-ADULT-[place]     At bedside/Family notified

## 2024-12-16 ENCOUNTER — APPOINTMENT (OUTPATIENT)
Dept: INTERNAL MEDICINE | Facility: CLINIC | Age: 39
End: 2024-12-16

## 2024-12-19 NOTE — ED ADULT NURSE NOTE - CHPI ED NUR SEVERITY2
MODERATE Render Risk Assessment In Note?: no Additional Notes: Worsens in the heat and summer Detail Level: Simple

## 2025-01-13 NOTE — ED PROVIDER NOTE - NSICDXPASTSURGICALHX_GEN_ALL_CORE_FT
Please return to the ED with new or worsening symptoms. Follow up with PCP for recheck.     Make sure you follow up for your abnormal pancreatic findings.    PAST SURGICAL HISTORY:  H/O gastric sleeve

## 2025-03-31 ENCOUNTER — EMERGENCY (EMERGENCY)
Facility: HOSPITAL | Age: 40
LOS: 1 days | Discharge: HOME | End: 2025-03-31
Attending: EMERGENCY MEDICINE
Payer: MEDICAID

## 2025-03-31 VITALS
SYSTOLIC BLOOD PRESSURE: 221 MMHG | TEMPERATURE: 101 F | WEIGHT: 179.68 LBS | OXYGEN SATURATION: 99 % | RESPIRATION RATE: 20 BRPM | HEART RATE: 117 BPM | DIASTOLIC BLOOD PRESSURE: 100 MMHG

## 2025-03-31 DIAGNOSIS — Z90.3 ACQUIRED ABSENCE OF STOMACH [PART OF]: Chronic | ICD-10-CM

## 2025-03-31 PROCEDURE — 99284 EMERGENCY DEPT VISIT MOD MDM: CPT

## 2025-03-31 NOTE — ED ADULT TRIAGE NOTE - CHIEF COMPLAINT QUOTE
c/o sore throat and difficulty swallowing x 4 days. "I have a cut in the back of my throat". having a hard time swallowing oral secretions in triage. able to speak in full sentences, hx htn, but has not taken medications :in month". c/o sore throat and difficulty swallowing x 4 days. "I have a cut in the back of my throat". having a hard time swallowing oral secretions in triage. able to speak in full sentences, hx htn, but has not taken medications :in month". took 1g Tylenol @ 8pm.

## 2025-04-01 VITALS
HEART RATE: 110 BPM | TEMPERATURE: 100 F | OXYGEN SATURATION: 97 % | SYSTOLIC BLOOD PRESSURE: 163 MMHG | RESPIRATION RATE: 19 BRPM | DIASTOLIC BLOOD PRESSURE: 91 MMHG

## 2025-04-01 LAB
ALBUMIN SERPL ELPH-MCNC: 4 G/DL — SIGNIFICANT CHANGE UP (ref 3.3–5.2)
ALP SERPL-CCNC: 114 U/L — SIGNIFICANT CHANGE UP (ref 40–120)
ALT FLD-CCNC: 30 U/L — SIGNIFICANT CHANGE UP
ANION GAP SERPL CALC-SCNC: 17 MMOL/L — SIGNIFICANT CHANGE UP (ref 5–17)
AST SERPL-CCNC: 30 U/L — SIGNIFICANT CHANGE UP
BASOPHILS # BLD AUTO: 0.07 K/UL — SIGNIFICANT CHANGE UP (ref 0–0.2)
BASOPHILS # BLD MANUAL: 0 K/UL — SIGNIFICANT CHANGE UP (ref 0–0.2)
BASOPHILS NFR BLD AUTO: 0.4 % — SIGNIFICANT CHANGE UP (ref 0–2)
BASOPHILS NFR BLD MANUAL: 0 % — SIGNIFICANT CHANGE UP (ref 0–2)
BILIRUB SERPL-MCNC: 0.9 MG/DL — SIGNIFICANT CHANGE UP (ref 0.4–2)
BUN SERPL-MCNC: 13.4 MG/DL — SIGNIFICANT CHANGE UP (ref 8–20)
CALCIUM SERPL-MCNC: 9.3 MG/DL — SIGNIFICANT CHANGE UP (ref 8.4–10.5)
CHLORIDE SERPL-SCNC: 97 MMOL/L — SIGNIFICANT CHANGE UP (ref 96–108)
CO2 SERPL-SCNC: 21 MMOL/L — LOW (ref 22–29)
CREAT SERPL-MCNC: 0.57 MG/DL — SIGNIFICANT CHANGE UP (ref 0.5–1.3)
EGFR: 129 ML/MIN/1.73M2 — SIGNIFICANT CHANGE UP
EGFR: 129 ML/MIN/1.73M2 — SIGNIFICANT CHANGE UP
EOSINOPHIL # BLD AUTO: 0.05 K/UL — SIGNIFICANT CHANGE UP (ref 0–0.5)
EOSINOPHIL # BLD MANUAL: 0 K/UL — SIGNIFICANT CHANGE UP (ref 0–0.5)
EOSINOPHIL NFR BLD AUTO: 0.3 % — SIGNIFICANT CHANGE UP (ref 0–6)
EOSINOPHIL NFR BLD MANUAL: 0 % — SIGNIFICANT CHANGE UP (ref 0–6)
GLUCOSE SERPL-MCNC: 98 MG/DL — SIGNIFICANT CHANGE UP (ref 70–99)
HCT VFR BLD CALC: 41.1 % — SIGNIFICANT CHANGE UP (ref 39–50)
HETEROPH AB TITR SER AGGL: NEGATIVE — SIGNIFICANT CHANGE UP
HGB BLD-MCNC: 13.9 G/DL — SIGNIFICANT CHANGE UP (ref 13–17)
HIV 1 & 2 AB SERPL IA.RAPID: SIGNIFICANT CHANGE UP
IMM GRANULOCYTES # BLD AUTO: 0.22 K/UL — HIGH (ref 0–0.07)
IMM GRANULOCYTES NFR BLD AUTO: 1.1 % — HIGH (ref 0–0.9)
LYMPHOCYTES # BLD AUTO: 1.62 K/UL — SIGNIFICANT CHANGE UP (ref 1–3.3)
LYMPHOCYTES # BLD MANUAL: 1.7 K/UL — SIGNIFICANT CHANGE UP (ref 1–3.3)
LYMPHOCYTES NFR BLD AUTO: 8.1 % — LOW (ref 13–44)
LYMPHOCYTES NFR BLD MANUAL: 8.5 % — LOW (ref 13–44)
MCHC RBC-ENTMCNC: 32.2 PG — SIGNIFICANT CHANGE UP (ref 27–34)
MCHC RBC-ENTMCNC: 33.8 G/DL — SIGNIFICANT CHANGE UP (ref 32–36)
MCV RBC AUTO: 95.1 FL — SIGNIFICANT CHANGE UP (ref 80–100)
MONOCYTES # BLD AUTO: 1.76 K/UL — HIGH (ref 0–0.9)
MONOCYTES # BLD MANUAL: 1.36 K/UL — HIGH (ref 0–0.9)
MONOCYTES NFR BLD AUTO: 8.8 % — SIGNIFICANT CHANGE UP (ref 2–14)
MONOCYTES NFR BLD MANUAL: 6.8 % — SIGNIFICANT CHANGE UP (ref 2–14)
NEUTROPHILS # BLD AUTO: 16.25 K/UL — HIGH (ref 1.8–7.4)
NEUTROPHILS # BLD MANUAL: 16.91 K/UL — HIGH (ref 1.8–7.4)
NEUTROPHILS NFR BLD AUTO: 81.3 % — HIGH (ref 43–77)
NEUTROPHILS NFR BLD MANUAL: 84.7 % — HIGH (ref 43–77)
NRBC # BLD AUTO: 0 K/UL — SIGNIFICANT CHANGE UP (ref 0–0)
NRBC # FLD: 0 K/UL — SIGNIFICANT CHANGE UP (ref 0–0)
NRBC BLD AUTO-RTO: 0 /100 WBCS — SIGNIFICANT CHANGE UP (ref 0–0)
PLAT MORPH BLD: NORMAL — SIGNIFICANT CHANGE UP
PLATELET # BLD AUTO: 252 K/UL — SIGNIFICANT CHANGE UP (ref 150–400)
PMV BLD: 9.1 FL — SIGNIFICANT CHANGE UP (ref 7–13)
POTASSIUM SERPL-MCNC: 3.4 MMOL/L — LOW (ref 3.5–5.3)
POTASSIUM SERPL-SCNC: 3.4 MMOL/L — LOW (ref 3.5–5.3)
PROT SERPL-MCNC: 7.5 G/DL — SIGNIFICANT CHANGE UP (ref 6.6–8.7)
RBC # BLD: 4.32 M/UL — SIGNIFICANT CHANGE UP (ref 4.2–5.8)
RBC # FLD: 13 % — SIGNIFICANT CHANGE UP (ref 10.3–14.5)
RBC BLD AUTO: NORMAL — SIGNIFICANT CHANGE UP
S PYO DNA THROAT QL NAA+PROBE: DETECTED
SODIUM SERPL-SCNC: 135 MMOL/L — SIGNIFICANT CHANGE UP (ref 135–145)
WBC # BLD: 19.97 K/UL — HIGH (ref 3.8–10.5)
WBC # FLD AUTO: 19.97 K/UL — HIGH (ref 3.8–10.5)

## 2025-04-01 PROCEDURE — 85025 COMPLETE CBC W/AUTO DIFF WBC: CPT

## 2025-04-01 PROCEDURE — 87491 CHLMYD TRACH DNA AMP PROBE: CPT

## 2025-04-01 PROCEDURE — 87651 STREP A DNA AMP PROBE: CPT

## 2025-04-01 PROCEDURE — 96375 TX/PRO/DX INJ NEW DRUG ADDON: CPT

## 2025-04-01 PROCEDURE — 87040 BLOOD CULTURE FOR BACTERIA: CPT

## 2025-04-01 PROCEDURE — 86703 HIV-1/HIV-2 1 RESULT ANTBDY: CPT

## 2025-04-01 PROCEDURE — 80053 COMPREHEN METABOLIC PANEL: CPT

## 2025-04-01 PROCEDURE — 96374 THER/PROPH/DIAG INJ IV PUSH: CPT

## 2025-04-01 PROCEDURE — 36415 COLL VENOUS BLD VENIPUNCTURE: CPT

## 2025-04-01 PROCEDURE — 87798 DETECT AGENT NOS DNA AMP: CPT

## 2025-04-01 PROCEDURE — 87591 N.GONORRHOEAE DNA AMP PROB: CPT

## 2025-04-01 PROCEDURE — 86308 HETEROPHILE ANTIBODY SCREEN: CPT

## 2025-04-01 PROCEDURE — 99284 EMERGENCY DEPT VISIT MOD MDM: CPT | Mod: 25

## 2025-04-01 RX ORDER — LIDOCAINE HYDROCHLORIDE 20 MG/ML
15 JELLY TOPICAL ONCE
Refills: 0 | Status: COMPLETED | OUTPATIENT
Start: 2025-04-01 | End: 2025-04-01

## 2025-04-01 RX ORDER — PREDNISONE 20 MG/1
2 TABLET ORAL
Qty: 8 | Refills: 0
Start: 2025-04-01 | End: 2025-04-04

## 2025-04-01 RX ORDER — AMPICILLIN SODIUM AND SULBACTAM SODIUM 1; .5 G/1; G/1
3 INJECTION, POWDER, FOR SOLUTION INTRAMUSCULAR; INTRAVENOUS ONCE
Refills: 0 | Status: COMPLETED | OUTPATIENT
Start: 2025-04-01 | End: 2025-04-01

## 2025-04-01 RX ORDER — AMOXICILLIN AND CLAVULANATE POTASSIUM 500; 125 MG/1; MG/1
1 TABLET, FILM COATED ORAL
Qty: 14 | Refills: 0
Start: 2025-04-01 | End: 2025-04-07

## 2025-04-01 RX ORDER — IBUPROFEN 200 MG
1 TABLET ORAL
Qty: 15 | Refills: 0
Start: 2025-04-01 | End: 2025-04-05

## 2025-04-01 RX ORDER — DEXAMETHASONE 0.5 MG/1
10 TABLET ORAL ONCE
Refills: 0 | Status: COMPLETED | OUTPATIENT
Start: 2025-04-01 | End: 2025-04-01

## 2025-04-01 RX ORDER — KETOROLAC TROMETHAMINE 30 MG/ML
15 INJECTION, SOLUTION INTRAMUSCULAR; INTRAVENOUS ONCE
Refills: 0 | Status: DISCONTINUED | OUTPATIENT
Start: 2025-04-01 | End: 2025-04-01

## 2025-04-01 RX ADMIN — KETOROLAC TROMETHAMINE 15 MILLIGRAM(S): 30 INJECTION, SOLUTION INTRAMUSCULAR; INTRAVENOUS at 02:49

## 2025-04-01 RX ADMIN — DEXAMETHASONE 102 MILLIGRAM(S): 0.5 TABLET ORAL at 03:04

## 2025-04-01 RX ADMIN — Medication 1000 MILLILITER(S): at 02:49

## 2025-04-01 RX ADMIN — AMPICILLIN SODIUM AND SULBACTAM SODIUM 200 GRAM(S): 1; .5 INJECTION, POWDER, FOR SOLUTION INTRAMUSCULAR; INTRAVENOUS at 03:51

## 2025-04-01 RX ADMIN — LIDOCAINE HYDROCHLORIDE 15 MILLILITER(S): 20 JELLY TOPICAL at 02:48

## 2025-04-01 NOTE — ED PROVIDER NOTE - OBJECTIVE STATEMENT
Patient is a 39-year-old male presenting with sore throat.  He notes it has been present for the past 4 days.  Associate with fever.  Pain has been worsening for the past several days.  Pain is worse in the right side.  Denies any nasal congestion, nausea, vomiting, chest pain, shortness of breath, coughing, abdominal pain.  No sick contacts.  No recent travel.  Patient has tried Tylenol without improvement.  Patient states he regularly engages in oral sex but has no history of STIs.

## 2025-04-01 NOTE — ED PROVIDER NOTE - ENMT, MLM
Airway patent, Nasal mucosa clear.   Posterior pharyngeal erythema and bilateral tonsillar swelling right greater than left.  No obvious PTA.  Uvula midline.  Tolerating secretions.  No swelling to floor of mouth.   tender Submandibular lymphadenopathy

## 2025-04-01 NOTE — ED ADULT NURSE NOTE - IN THE PAST 12 MONTHS HAVE YOU USED DRUGS OTHER THAN THOSE REQUIRED FOR MEDICAL REASON?
ASSESSMENT: 38 y/o M with PMH HTN, ESRD on dialysis, hyperthyroidism (no meds) who presents for BRBPR in s/o 3 column hemorrhoidectomy on 6/25 with Dr. Ontiveros recently admitted 07/01 for similar presentation requiring blood transfusion and rectal packing now with symptomatic anemia     PLAN:    Neurologic:   -Pain control PRN    Respiratory:  -On RA  -SpO2 >92%     Cardiovascular:   -continue to monitor BP  -currently not requiring pressors     Gastrointestinal/Nutrition:   -NPO  -IR consulted - would defer intervention at this time as focal lesions in the sigmoid are small and likely have limited clinical impact given degree of bleeding from the rectum.    -GI consult pending     Renal/Genitourinary:   -LR@100  -Monitor UOP  -BMP QD  -Hemodialyses at home on his own, last HD 7/10    Hematologic: s/p 3U pRBC in ED  -continue to trend h/h     Infectious Disease:   -monitor CBC  -remains afebrile     Tubes/Lines/Drains:   PIV x2    Endocrine:   CINDY    Disposition:   SICU   ASSESSMENT: 38 y/o M with PMH HTN, ESRD on dialysis, hyperthyroidism (no meds) who presents for BRBPR in s/o 3 column hemorrhoidectomy on 6/25 with Dr. Ontiveros recently admitted 07/01 for similar presentation requiring blood transfusion and rectal packing now with symptomatic anemia     PLAN:    Neurologic:   -Pain control PRN    Respiratory:  -On RA  -SpO2 >92%     Cardiovascular:   -continue to monitor BP    Gastrointestinal/Nutrition:   - Regular diet  -IR consulted - would defer intervention at this time as focal lesions in the sigmoid are small and likely have limited clinical impact given degree of bleeding from the rectum.    - Added miralax for constipation    Renal/Genitourinary:   -IV lock  -Monitor UOP  -BMP QD  -Hemodialyses at home on his own, last HD 7/10    Hematologic: s/p 3U pRBC in ED  -continue to trend h/h     Infectious Disease:   -monitor CBC  -remains afebrile     Tubes/Lines/Drains:   PIV x2    Endocrine:   CINDY    Disposition:   SICU   ASSESSMENT: 38 y/o M with PMH HTN, ESRD on dialysis, hyperthyroidism (no meds) who presents for BRBPR in s/o 3 column hemorrhoidectomy on 6/25 with Dr. Ontiveros recently admitted 07/01 for similar presentation requiring blood transfusion and rectal packing now with symptomatic anemia     PLAN:    Neurologic:   -Pain control PRN    Respiratory:  -On RA  -SpO2 >92%     Cardiovascular:   -continue to monitor BP    Gastrointestinal/Nutrition:   - Regular diet  -IR consulted - would defer intervention at this time as focal lesions in the sigmoid are small and likely have limited clinical impact given degree of bleeding from the rectum.    - Added miralax for constipation    Renal/Genitourinary:   -IV lock  -Monitor UOP  -BMP QD  -Hemodialyses at home on his own, last HD 7/10  - Restarted Calcitriol 0.5mcg daily per Nephrology    Hematologic: s/p 3U pRBC in ED  -continue to trend h/h     Infectious Disease:   -monitor CBC  -remains afebrile     Tubes/Lines/Drains:   PIV x2    Endocrine:   CINDY    Disposition:   SICU   No

## 2025-04-01 NOTE — ED PROVIDER NOTE - PATIENT PORTAL LINK FT
You can access the FollowMyHealth Patient Portal offered by [facility] by registering at the following website: [email or web address]. By joining [facility]’s FollowMyHealth portal, you will also be able to view your health information using other applications (apps) compatible with our system.

## 2025-04-01 NOTE — ED PROVIDER NOTE - CLINICAL SUMMARY MEDICAL DECISION MAKING FREE TEXT BOX
patient presented with throat infection.  Noted be febrile tachycardic here.  Will obtain labs, blood culture, throat swab, test for gonorrhea chlamydia and HIV, mono screen.  Symptom control and reevaluate.

## 2025-04-01 NOTE — ED ADULT NURSE NOTE - CHIEF COMPLAINT QUOTE
c/o sore throat and difficulty swallowing x 4 days. "I have a cut in the back of my throat". having a hard time swallowing oral secretions in triage. able to speak in full sentences, hx htn, but has not taken medications :in month". took 1g Tylenol @ 8pm.

## 2025-04-01 NOTE — ED PROVIDER NOTE - NSFOLLOWUPINSTRUCTIONS_ED_ALL_ED_FT
Strep throat is an infection in the throat that is caused by bacteria. It is common during the cold months of the year. It mostly affects children who are 5–15 years old. However, people of all ages can get it at any time of the year. This infection spreads from person to person (is contagious) through coughing, sneezing, or having close contact.    Your health care provider may use other names to describe the infection. It can be called tonsillitis (if there is swelling of the tonsils), or pharyngitis (if there is swelling at the back of the throat).    What are the causes?  This condition is caused by the Streptococcus pyogenes bacteria.    What increases the risk?  You are more likely to develop this condition if:    You care for school-age children, or are around school-age children. Children are more likely to get strep throat and may spread it to others.  You spend time in crowded places where the infection can spread easily.  You have close contact with someone who has strep throat.    What are the signs or symptoms?  Symptoms of this condition include:    Fever or chills.  Redness, swelling, or pain in the tonsils or throat.  Pain or difficulty when swallowing.  White or yellow spots on the tonsils or throat.  Tender glands in the neck and under the jaw.  Bad smelling breath.  Red rash all over the body. This is rare.    How is this diagnosed?     This condition is diagnosed by tests that check for the presence and the amount of bacteria that cause strep throat. They are:    Rapid strep test. Your throat is swabbed and checked for the presence of bacteria. Results are usually ready in minutes.  Throat culture test. Your throat is swabbed. The sample is placed in a cup that allows infections to grow. Results are usually ready in 1 or 2 days.    How is this treated?  This condition may be treated with:    Medicines that kill germs (antibiotics).  Medicines that relieve pain or fever. These include:    Ibuprofen or acetaminophen.  Aspirin, only for patients who are over the age of 18.  Throat lozenges.  Throat sprays.    Follow these instructions at home:      Medicines     Take over-the-counter and prescription medicines only as told by your health care provider.  Take your antibiotic medicine as told by your health care provider. Do not stop taking the antibiotic even if you start to feel better.        Eating and drinking     If you have trouble swallowing, try eating soft foods until your sore throat feels better.  Drink enough fluid to keep your urine pale yellow.  To help relieve pain, you may have:     Warm fluids, such as soup and tea.  Cold fluids, such as frozen desserts or popsicles.        General instructions    Gargle with a salt-water mixture 3–4 times a day or as needed. To make a salt-water mixture, completely dissolve ½–1 tsp (3–6 g) of salt in 1 cup (237 mL) of warm water.  Get plenty of rest.  Stay home from work or school until you have been taking antibiotics for 24 hours.  Avoid smoking or being around people who smoke.  Keep all follow-up visits as told by your health care provider. This is important.    How is this prevented?     Do not share food, drinking cups, or personal items that could cause the infection to spread to other people.  Wash your hands well with soap and water, and make sure that all people in your house wash their hands well.  Have family members tested if they have a sore throat or fever. They may need an antibiotic if they have strep throat.    Contact a health care provider if:  The glands in your neck continue to get bigger.  You develop a rash, cough, or earache.  You cough up a thick mucus that is green, yellow-brown, or bloody.  You have pain or discomfort that does not get better with medicine.  Your symptoms seem to be getting worse and not better.  You have a fever.    Get help right away if:  You have new symptoms, such as vomiting, severe headache, stiff or painful neck, chest pain, or shortness of breath.  You have severe throat pain, drooling, or changes in your voice.  You have swelling of the neck, or the skin on the neck becomes red and tender.  You have signs of dehydration, such as tiredness (fatigue), dry mouth, and decreased urination.  You become increasingly sleepy, or you cannot wake up completely.  Your joints become red or painful.    Summary  Strep throat is an infection in the throat that is caused by the Streptococcus pyogenes bacteria. This infection is spread from person to person (is contagious) through coughing, sneezing, or having close contact.  Take your medicines, including antibiotics, as told by your health care provider. Do not stop taking the antibiotic even if you start to feel better.  To prevent the spread of germs, wash your hands well with soap and water. Have others do the same. Do not share food, drinking cups, or personal items.  Get help right away if you have new symptoms, such as vomiting, severe headache, stiff or painful neck, chest pain, or shortness of breath.    ADDITIONAL NOTES AND INSTRUCTIONS    Please follow up with your Primary MD in 24-48 hr.  Seek immediate medical care for any new/worsening signs or symptoms.

## 2025-04-01 NOTE — ED PROVIDER NOTE - PROGRESS NOTE DETAILS
Patient feeling significantly improved.  Vital signs improved.  Will discharge with antibiotics, steroids, NSAIDs.  Return precautions discussed with patient is comfortable with the discharge plan at this time.

## 2025-04-02 LAB
C TRACH RRNA SPEC QL NAA+PROBE: SIGNIFICANT CHANGE UP
C TRACH+GC RRNA SPEC QL PROBE: SIGNIFICANT CHANGE UP
N GONORRHOEA RRNA SPEC QL NAA+PROBE: SIGNIFICANT CHANGE UP

## 2025-04-06 LAB
CULTURE RESULTS: SIGNIFICANT CHANGE UP
SPECIMEN SOURCE: SIGNIFICANT CHANGE UP